# Patient Record
Sex: FEMALE | Race: WHITE | NOT HISPANIC OR LATINO | Employment: OTHER | URBAN - METROPOLITAN AREA
[De-identification: names, ages, dates, MRNs, and addresses within clinical notes are randomized per-mention and may not be internally consistent; named-entity substitution may affect disease eponyms.]

---

## 2017-01-27 ENCOUNTER — OFFICE VISIT (OUTPATIENT)
Dept: ENDOCRINOLOGY | Facility: CLINIC | Age: 77
End: 2017-01-27
Payer: MEDICARE

## 2017-01-27 VITALS
DIASTOLIC BLOOD PRESSURE: 88 MMHG | WEIGHT: 189.81 LBS | BODY MASS INDEX: 30.51 KG/M2 | HEIGHT: 66 IN | SYSTOLIC BLOOD PRESSURE: 152 MMHG | HEART RATE: 80 BPM

## 2017-01-27 DIAGNOSIS — E03.9 HYPOTHYROIDISM, UNSPECIFIED TYPE: Primary | ICD-10-CM

## 2017-01-27 DIAGNOSIS — E87.1 HYPONATREMIA: Primary | ICD-10-CM

## 2017-01-27 DIAGNOSIS — E87.1 HYPONATREMIA: ICD-10-CM

## 2017-01-27 PROCEDURE — 99214 OFFICE O/P EST MOD 30 MIN: CPT | Mod: PBBFAC,PO | Performed by: INTERNAL MEDICINE

## 2017-01-27 PROCEDURE — 99204 OFFICE O/P NEW MOD 45 MIN: CPT | Mod: S$PBB,,, | Performed by: INTERNAL MEDICINE

## 2017-01-27 PROCEDURE — 99999 PR PBB SHADOW E&M-EST. PATIENT-LVL IV: CPT | Mod: PBBFAC,,, | Performed by: INTERNAL MEDICINE

## 2017-01-27 RX ORDER — LEVOTHYROXINE SODIUM 25 UG/1
50 TABLET ORAL EVERY MORNING
Refills: 2 | COMMUNITY
Start: 2017-01-16 | End: 2017-05-17

## 2017-01-27 NOTE — PROGRESS NOTES
CHIEF COMPLAINT: Hypothyroid  76 year old being seen as a new patient. Hypothyroid since late 40's. On synthroid 25 mcg. Was on 50 mcg. She does have some fatigue. No palpitations. No tremors. No diarrhea or constipation. States that she had seen a nephrologist in the past for hyponatremia. She was on HCTZ in the past. Takes PRN lasix. On lexapro             PAST MEDICAL HISTORY/PAST SURGICAL HISTORY:  Reviewed in [facility]    SOCIAL HISTORY: No T/A    FAMILY HISTORY:  Daughter has hyperthyroidism. No DM    MEDICATIONS/ALLERGIES: The patient's MedCard has been updated and reviewed.      ROS:   Constitutional: No recent significant weight change  Eyes: No recent visual changes  ENT: No dysphagia  Cardiovascular: Denies current anginal symptoms  Respiratory: Denies current respiratory difficulty  Gastrointestinal: Denies recent bowel disturbances  GenitoUrinary - No dysuria  Skin: No new skin rash  Neurologic: No focal neurologic complaints  Remainder ROS negative        PE:    GENERAL: Well developed, well nourished.  PSYCH:  appropriate mood and affect  EYES:  PERRL, EOM intact.  ENT: Nares patent, oropharynx clear, mucosa pink,   NECK: Supple, trachea midline, No palpable thyroid nodules  CHEST: Resp even and unlabored, CTA bilateral.  CARDIAC: RRR, S1, S2 heard, no murmurs, rubs, S3, or S4  ABDOMEN: Soft, non-tender, non-distended;  No organomegaly  VASCULAR:  DP pulses +2/4 bilaterally, no edema  NEURO: Gait steady, CN II-VII grossly intact  SKIN: No areas of breakdown, no acanthosis nigracans.    Results for TIFFANY TRINA R (MRN [id number]) as of 1/27/2017 10:30   Ref. Range 9/29/2016 14:15 11/8/2016 14:40 1/17/2017 14:18   TSH Latest Ref Range: 0.400 - 4.000 uIU/mL 0.016 (L) 0.255 (L) 0.887   Free T4 Latest Ref Range: 0.78 - 2.19 ng/dL 0.94     Thyroglobulin Latest Ref Range: 1.3 - 31.8 ng/mL 0.6 (L)     Thyroperoxidase Antibodies Latest Ref Range: 0.0 - 9.0 IU/mL 1.0         ASSESSMENT/PLAN:  1. Hypothyroid- Dose  recently decreased. Last TSH WNL. Continue current Tx and recheck levels.     2. Hyponatremia- Will check with next labs. On SSRI which can associated with with SIADH. Appears she is restricting fluid but she does not recall how much.       FOLLOWUP  4 weeks- TSH, Ft4, 8 AM ACTH, cortisol, uric acid, CMP, osm, urine osm, urine Na

## 2017-01-27 NOTE — LETTER
January 27, 2017      Felicita Hogan MD  [address]  [facility] [address]           [facility]  [address] [facility] [address] [place] [address]  Phone: [phone]  Fax: [phone]          Patient: Ela Mascorro   MR Number: [id number]   YOB: 1940   Date of Visit: 1/27/2017       Dear Dr. Felicita Hogan:    Thank you for referring Ela Mascorro to me for evaluation. Attached you will find relevant portions of my assessment and plan of care.    If you have questions, please do not hesitate to call me. I look forward to following Ela Mascorro along with you.    Sincerely,    DO Marcella Lozano  CC:  No Recipients    If you would like to receive this communication electronically, please contact [email or web address] or [phone] to request more information on [facility] access.    For providers and/or their staff who would like to refer a patient to [facility], please contact us through our one-stop-shop provider referral line, [facility] [place], at [phone].    If you feel you have received this communication in error or would no longer like to receive these types of communications, please e-mail [email or web address]

## 2017-01-27 NOTE — MR AVS SNAPSHOT
[facility]  [address] [facility] [address]  [place] [address]  Phone: [phone]  Fax: [phone]                  Elakme Nguyenford   2017 10:00 AM   Office Visit    Description:  Female : 1940   Provider:  Lior Naidu DO   Department:  [facility]           Diagnoses this Visit        Comments    Hypothyroidism, unspecified type    -  Primary     Hyponatremia                To Do List           Future Appointments        Provider Department Dept Phone    2017 8:00 AM LAB, [place] [facility] [phone]      Goals (5 Years of Data)     None      [facility]     [facility] On [facility] Nurse Care Line -  Assistance  Registered nurses in the [facility] Center provide clinical advisement, health education, appointment booking, and other advisory services.  Call for this free service at [phone].             Medications           Message regarding Medications     Verify the changes and/or additions to your medication regime listed below are the same as discussed with your clinician today.  If any of these changes or additions are incorrect, please notify your healthcare provider.        STOP taking these medications     pantoprazole (PROTONIX) 40 MG tablet Take 40 mg by mouth once daily.           Verify that the below list of medications is an accurate representation of the medications you are currently taking.  If none reported, the list may be blank. If incorrect, please contact your healthcare provider. Carry this list with you in case of emergency.           Current Medications     ASPIRIN (ASPIR-81 ORAL) Take by mouth.    atorvastatin (LIPITOR) 40 MG tablet TAKE 1 TABLET BY MOUTH EVERYDAY    bifidobacterium infantis (ALIGN) 4 mg Cap Take 4 mg by mouth Daily. ALIGN 4 MG CAPS    biotin-silicon diox-L-cysteine 5,000 mcg-100 mg- 50 mg Tab BIOTIN 5000 CAPS    BYSTOLIC 5 mg Tab TAKE 1 TABLET BY MOUTH EVERY DAY    BYSTOLIC 5 mg Tab TAKE 1  TABLET BY MOUTH EVERY DAY    CALCIUM CARBONATE/VITAMIN D3 (VITAMIN D-3 ORAL) VITAMIN D3 TABS    desloratadine (CLARINEX) 5 mg tablet Take 5 mg by mouth once daily.    escitalopram oxalate (LEXAPRO) 20 MG tablet Take 1 tablet (20 mg total) by mouth every evening.    esomeprazole (NEXIUM) 40 MG capsule Take 40 mg by mouth before breakfast.    furosemide (LASIX) 20 MG tablet Take 1 tablet in the mornings up to 3 days a week if needed for leg edema    hydrALAZINE (APRESOLINE) 10 MG tablet TAKE ONE TABLET BY MOUTH TWICE DAILY    hydrocodone-acetaminophen 5-325mg (NORCO) 5-325 mg per tablet Take 1 tablet by mouth every 4 (four) hours as needed for Pain.    levothyroxine (SYNTHROID) 25 MCG tablet Take 12.5 mcg by mouth every morning.    mecobal-levomefolat Ca-B6 phos 3-35-2 mg Tab TAKE ONE TABLET BY MOUTH ONE TIME DAILY    montelukast (SINGULAIR) 10 mg tablet Take 10 mg by mouth once daily. SINGULAIR 10 MG TABS    nitroGLYCERIN (NITROSTAT) 0.4 MG SL tablet Place 1 tablet (0.4 mg total) under the tongue as directed. 1 SL every 5 minutes prn chest pain; Call 911 for chest pain not relieved with 3 NTG tablets    nutritional supplement - fiber Liqd JUICE PLUS FIBRE LIQD    olmesartan (BENICAR) 40 MG tablet Take 1 tablet (40 mg total) by mouth once daily.    polyethylene glycol (GLYCOLAX) 17 gram PwPk Take 17 g by mouth 2 (two) times daily as needed.    potassium chloride SA (K-DUR,KLOR-CON) 20 MEQ tablet Take 1 tablet in the mornings up to 3 days a week only on days furosemide is taken    pregabalin (LYRICA) 75 MG capsule 1 capsule 2-3 times a day as needed    PREMARIN 0.625 mg tablet TAKE 1 TABLET (0.625 MG TOTAL) BY MOUTH ONCE DAILY.    butalbital-acetaminophen-caffeine -40 mg (FIORICET, ESGIC) -40 mg per tablet Take 1-2 tablets by mouth as directed. 1-2 po q 4 hr prn headache; maximum of 6 per 24 hr           Clinical Reference Information           Vital Signs - Last Recorded  Most recent update: 1/27/2017  "10:07 AM by Lima Montiel LPN    BP Pulse Ht Wt BMI    (!) 152/88 (BP Method: Manual) 80 5' 6" (1.676 m) 86.1 kg (189 lb 13.1 oz) 30.64 kg/m2      Blood Pressure          Most Recent Value    BP  (!)  152/88      Allergies as of 1/27/2017     Azithromycin    Clarithromycin    Codeine    Hydralazine Analogues    Hydrochlorothiazide    Iodinated Contrast Media - Iv Dye    Levofloxacin    Moxifloxacin    Sulfamethoxazole-trimethoprim      Immunizations Administered on Date of Encounter - 1/27/2017     None      Orders Placed During Today's Visit      Normal Orders This Visit    OSMOLALITY, URINE RANDOM     Sodium, urine, random     Future Labs/Procedures Expected by Expires    ACTH  1/27/2017 3/28/2018    Comprehensive metabolic panel  1/27/2017 1/28/2018    Cortisol  1/27/2017 1/27/2018    Osmolality  1/27/2017 3/28/2018    T4, free  1/27/2017 1/27/2018    TSH  1/27/2017 1/27/2018    URIC ACID  1/27/2017 3/28/2018      "

## 2017-02-24 ENCOUNTER — LAB VISIT (OUTPATIENT)
Dept: LAB | Facility: HOSPITAL | Age: 77
End: 2017-02-24
Attending: INTERNAL MEDICINE
Payer: MEDICARE

## 2017-02-24 DIAGNOSIS — J31.0 CHRONIC RHINITIS: ICD-10-CM

## 2017-02-24 DIAGNOSIS — E03.9 HYPOTHYROIDISM, UNSPECIFIED TYPE: ICD-10-CM

## 2017-02-24 DIAGNOSIS — D83.0 BAFF RECEPTOR DEFICIENCY: Primary | ICD-10-CM

## 2017-02-24 DIAGNOSIS — E87.1 HYPONATREMIA: ICD-10-CM

## 2017-02-24 DIAGNOSIS — J30.1 ALLERGIC RHINITIS DUE TO POLLEN: ICD-10-CM

## 2017-02-24 LAB
ALBUMIN SERPL BCP-MCNC: 3.3 G/DL
ALP SERPL-CCNC: 126 U/L
ALT SERPL W/O P-5'-P-CCNC: 35 U/L
ANION GAP SERPL CALC-SCNC: 8 MMOL/L
AST SERPL-CCNC: 22 U/L
BASOPHILS # BLD AUTO: 0.02 K/UL
BASOPHILS NFR BLD: 0.3 %
BILIRUB SERPL-MCNC: 0.4 MG/DL
BUN SERPL-MCNC: 11 MG/DL
CALCIUM SERPL-MCNC: 9.2 MG/DL
CHLORIDE SERPL-SCNC: 104 MMOL/L
CO2 SERPL-SCNC: 23 MMOL/L
CORTIS SERPL-MCNC: 1.1 UG/DL
CREAT SERPL-MCNC: 0.8 MG/DL
DIFFERENTIAL METHOD: ABNORMAL
EOSINOPHIL # BLD AUTO: 0 K/UL
EOSINOPHIL NFR BLD: 0.6 %
ERYTHROCYTE [DISTWIDTH] IN BLOOD BY AUTOMATED COUNT: 17 %
EST. GFR  (AFRICAN AMERICAN): >60 ML/MIN/1.73 M^2
EST. GFR  (NON AFRICAN AMERICAN): >60 ML/MIN/1.73 M^2
GLUCOSE SERPL-MCNC: 115 MG/DL
HCT VFR BLD AUTO: 39.9 %
HGB BLD-MCNC: 13.1 G/DL
IGA SERPL-MCNC: 90 MG/DL
IGE SERPL-ACNC: <35 IU/ML
IGG SERPL-MCNC: 449 MG/DL
IGM SERPL-MCNC: 87 MG/DL
LYMPHOCYTES # BLD AUTO: 2.2 K/UL
LYMPHOCYTES NFR BLD: 32.1 %
MCH RBC QN AUTO: 28.9 PG
MCHC RBC AUTO-ENTMCNC: 32.8 %
MCV RBC AUTO: 88 FL
MONOCYTES # BLD AUTO: 0.6 K/UL
MONOCYTES NFR BLD: 8.8 %
NEUTROPHILS # BLD AUTO: 4 K/UL
NEUTROPHILS NFR BLD: 57.8 %
OSMOLALITY SERPL: 281 MOSM/KG
PLATELET # BLD AUTO: 282 K/UL
PMV BLD AUTO: 9.7 FL
POTASSIUM SERPL-SCNC: 4.3 MMOL/L
PROT SERPL-MCNC: 6.5 G/DL
RBC # BLD AUTO: 4.53 M/UL
SODIUM SERPL-SCNC: 135 MMOL/L
T4 FREE SERPL-MCNC: 0.76 NG/DL
TSH SERPL DL<=0.005 MIU/L-ACNC: 4.68 UIU/ML
URATE SERPL-MCNC: 5.8 MG/DL
WBC # BLD AUTO: 6.92 K/UL

## 2017-02-24 PROCEDURE — 84550 ASSAY OF BLOOD/URIC ACID: CPT

## 2017-02-24 PROCEDURE — 80053 COMPREHEN METABOLIC PANEL: CPT

## 2017-02-24 PROCEDURE — 82785 ASSAY OF IGE: CPT

## 2017-02-24 PROCEDURE — 82533 TOTAL CORTISOL: CPT

## 2017-02-24 PROCEDURE — 84439 ASSAY OF FREE THYROXINE: CPT

## 2017-02-24 PROCEDURE — 86317 IMMUNOASSAY INFECTIOUS AGENT: CPT | Mod: 59

## 2017-02-24 PROCEDURE — 83930 ASSAY OF BLOOD OSMOLALITY: CPT

## 2017-02-24 PROCEDURE — 36415 COLL VENOUS BLD VENIPUNCTURE: CPT | Mod: PO

## 2017-02-24 PROCEDURE — 84443 ASSAY THYROID STIM HORMONE: CPT

## 2017-02-24 PROCEDURE — 82024 ASSAY OF ACTH: CPT

## 2017-02-24 PROCEDURE — 85025 COMPLETE CBC W/AUTO DIFF WBC: CPT

## 2017-02-24 PROCEDURE — 82787 IGG 1 2 3 OR 4 EACH: CPT | Mod: 59

## 2017-02-24 PROCEDURE — 82784 ASSAY IGA/IGD/IGG/IGM EACH: CPT

## 2017-02-27 LAB
IGG1 SER-MCNC: 306 MG/DL
IGG2 SER-MCNC: 127 MG/DL
IGG3 SER-MCNC: 13 MG/DL
IGG4 SER-MCNC: 7 MG/DL

## 2017-03-03 LAB
ACTH PLAS-MCNC: <10 PG/ML
C DIPHTHERIAE AB SER IA-ACNC: 0.06 IU/ML
C TETANI AB SER-ACNC: 0.06 IU/ML
DEPRECATED S PNEUM 1 IGG SER-MCNC: 2.8 MCG/ML
DEPRECATED S PNEUM12 IGG SER-MCNC: <0.3 MCG/ML
DEPRECATED S PNEUM14 IGG SER-MCNC: 0.8 MCG/ML
DEPRECATED S PNEUM19 IGG SER-MCNC: 1.5 MCG/ML
DEPRECATED S PNEUM23 IGG SER-MCNC: <0.3 MCG/ML
DEPRECATED S PNEUM3 IGG SER-MCNC: <0.3 MCG/ML
DEPRECATED S PNEUM4 IGG SER-MCNC: <0.3 MCG/ML
DEPRECATED S PNEUM5 IGG SER-MCNC: 7 MCG/ML
DEPRECATED S PNEUM8 IGG SER-MCNC: 2.6 MCG/ML
DEPRECATED S PNEUM9 IGG SER-MCNC: <0.3 MCG/ML
HAEM INFLU B IGG SER-MCNC: 1.67 MG/L
S PNEUM DA 18C IGG SER-MCNC: <0.3 MCG/ML
S PNEUM DA 6B IGG SER-MCNC: <0.3 MCG/ML
S PNEUM DA 7F IGG SER-MCNC: 0.7 MCG/ML
S PNEUM DA 9V IGG SER-MCNC: <0.3 MCG/ML

## 2017-03-05 ENCOUNTER — TELEPHONE (OUTPATIENT)
Dept: ENDOCRINOLOGY | Facility: CLINIC | Age: 77
End: 2017-03-05

## 2017-03-05 DIAGNOSIS — E87.1 HYPONATREMIA: ICD-10-CM

## 2017-03-05 DIAGNOSIS — E03.9 HYPOTHYROIDISM, UNSPECIFIED TYPE: Primary | ICD-10-CM

## 2017-03-06 NOTE — TELEPHONE ENCOUNTER
Let her know will need a higher dose of synthroid. Go back up to synthroid 25 mcg from 12.5 mcg daily. Check TSH 6 weeks. F/U 4 months with CMP, TSH, uric acid

## 2017-03-21 ENCOUNTER — OFFICE VISIT (OUTPATIENT)
Dept: ORTHOPEDICS | Facility: CLINIC | Age: 77
End: 2017-03-21
Payer: MEDICARE

## 2017-03-21 VITALS — WEIGHT: 189 LBS | BODY MASS INDEX: 30.37 KG/M2 | HEIGHT: 66 IN

## 2017-03-21 DIAGNOSIS — M19.079 ARTHRITIS, MIDFOOT: Primary | ICD-10-CM

## 2017-03-21 PROCEDURE — 20600 DRAIN/INJ JOINT/BURSA W/O US: CPT | Mod: PBBFAC,PO | Performed by: ORTHOPAEDIC SURGERY

## 2017-03-21 PROCEDURE — 99212 OFFICE O/P EST SF 10 MIN: CPT | Mod: PBBFAC,PO | Performed by: ORTHOPAEDIC SURGERY

## 2017-03-21 PROCEDURE — 99212 OFFICE O/P EST SF 10 MIN: CPT | Mod: S$PBB,25,, | Performed by: ORTHOPAEDIC SURGERY

## 2017-03-21 PROCEDURE — 99999 PR PBB SHADOW E&M-EST. PATIENT-LVL II: CPT | Mod: PBBFAC,,, | Performed by: ORTHOPAEDIC SURGERY

## 2017-03-21 RX ADMIN — TRIAMCINOLONE ACETONIDE 40 MG: 40 INJECTION, SUSPENSION INTRA-ARTICULAR; INTRAMUSCULAR at 08:03

## 2017-03-23 RX ORDER — TRIAMCINOLONE ACETONIDE 40 MG/ML
40 INJECTION, SUSPENSION INTRA-ARTICULAR; INTRAMUSCULAR
Status: DISCONTINUED | OUTPATIENT
Start: 2017-03-21 | End: 2017-03-23 | Stop reason: HOSPADM

## 2017-03-23 NOTE — PROGRESS NOTES
Subjective:      Patient ID: Ela Mascorro is a 76 y.o. female.    Pain      She is here for f/u today for bilateral foot pain.  She is s/p right midfoot fusion which has healed well but she has osteoarthritis in the entire foot and she is complaining of pain proximal to the fusion sites.   She rates her pain as 9/10 today on the right and 9/10 on the left. She would like an injection for both feet.     Social History     Occupational History    Not on file.     Social History Main Topics    Smoking status: Never Smoker    Smokeless tobacco: Never Used    Alcohol use No    Drug use: No    Sexual activity: Not on file      ROS    neg  Objective:    Ortho Exam     BLEs: neurovascularly intact,  tenderness to palpation dorsolateral midfoot on the left with moderate swelling.  tenderness to palpation at the talonavicular joint on the right.        Assessment:                Encounter Diagnosis   Name Primary?    Arthritis, midfoot Yes        Plan:     I injected both feet today. I can repeat injections every 4-6 months or prn as needed, however I would not repeat injections more than maybe 2 more times as they don't last her very long.

## 2017-03-23 NOTE — PROCEDURES
Small Joint Aspiration/Injection  Date/Time: 3/21/2017 8:18 AM  Performed by: JASON JOHNSON  Authorized by: JASON JOHNSON     Consent Done?:  Yes (Verbal)  Indications:  Pain  Site marked: The procedure site was marked      Location:  Foot  Site:  R intertarsal and L intertarsal  Prep: Patient was prepped and draped in usual sterile fashion    Needle size:  22 G  Approach:  Dorsal  Medications:  40 mg triamcinolone acetonide 40 mg/mL; 40 mg triamcinolone acetonide 40 mg/mL  Patient tolerance:  Patient tolerated the procedure well with no immediate complications

## 2017-03-29 ENCOUNTER — OFFICE VISIT (OUTPATIENT)
Dept: ORTHOPEDICS | Facility: CLINIC | Age: 77
End: 2017-03-29
Payer: MEDICARE

## 2017-03-29 ENCOUNTER — HOSPITAL ENCOUNTER (OUTPATIENT)
Dept: RADIOLOGY | Facility: HOSPITAL | Age: 77
Discharge: HOME | End: 2017-03-29
Attending: ORTHOPAEDIC SURGERY | Admitting: ORTHOPAEDIC SURGERY
Payer: MEDICARE

## 2017-03-29 VITALS
BODY MASS INDEX: 30.37 KG/M2 | HEART RATE: 79 BPM | DIASTOLIC BLOOD PRESSURE: 70 MMHG | WEIGHT: 189 LBS | SYSTOLIC BLOOD PRESSURE: 133 MMHG | HEIGHT: 66 IN

## 2017-03-29 DIAGNOSIS — M25.562 PAIN IN BOTH KNEES, UNSPECIFIED CHRONICITY: ICD-10-CM

## 2017-03-29 DIAGNOSIS — M25.561 PAIN IN BOTH KNEES, UNSPECIFIED CHRONICITY: ICD-10-CM

## 2017-03-29 DIAGNOSIS — M25.561 PAIN IN BOTH KNEES, UNSPECIFIED CHRONICITY: Primary | ICD-10-CM

## 2017-03-29 DIAGNOSIS — M17.0 PRIMARY OSTEOARTHRITIS OF BOTH KNEES: Primary | ICD-10-CM

## 2017-03-29 DIAGNOSIS — M25.562 PAIN IN BOTH KNEES, UNSPECIFIED CHRONICITY: Primary | ICD-10-CM

## 2017-03-29 PROCEDURE — 20610 DRAIN/INJ JOINT/BURSA W/O US: CPT | Mod: 50,PBBFAC,PO | Performed by: ORTHOPAEDIC SURGERY

## 2017-03-29 PROCEDURE — 99999 PR PBB SHADOW E&M-EST. PATIENT-LVL III: CPT | Mod: PBBFAC,,, | Performed by: ORTHOPAEDIC SURGERY

## 2017-03-29 PROCEDURE — 99213 OFFICE O/P EST LOW 20 MIN: CPT | Mod: PBBFAC,PO | Performed by: ORTHOPAEDIC SURGERY

## 2017-03-29 PROCEDURE — 73564 X-RAY EXAM KNEE 4 OR MORE: CPT | Mod: 26,50,, | Performed by: RADIOLOGY

## 2017-03-29 PROCEDURE — 99213 OFFICE O/P EST LOW 20 MIN: CPT | Mod: 25,S$PBB,, | Performed by: ORTHOPAEDIC SURGERY

## 2017-03-29 RX ORDER — TRIAMCINOLONE ACETONIDE 40 MG/ML
40 INJECTION, SUSPENSION INTRA-ARTICULAR; INTRAMUSCULAR
Status: DISCONTINUED | OUTPATIENT
Start: 2017-03-29 | End: 2017-03-29 | Stop reason: HOSPADM

## 2017-03-29 RX ADMIN — TRIAMCINOLONE ACETONIDE 40 MG: 40 INJECTION, SUSPENSION INTRA-ARTICULAR; INTRAMUSCULAR at 04:03

## 2017-03-29 NOTE — PROCEDURES
Large Joint Aspiration/Injection  Date/Time: 3/29/2017 4:16 PM  Performed by: JENNIFER PAUL  Authorized by: JENNIFER PAUL     Consent Done?:  Yes (Verbal)  Indications:  Pain  Procedure site marked: Yes    Timeout: Prior to procedure the correct patient, procedure, and site was verified      Location:  Knee  Site:  R knee and L knee  Prep: Patient was prepped and draped in usual sterile fashion    Needle size:  20 G  Approach:  Anterolateral  Medications:  40 mg triamcinolone acetonide 40 mg/mL; 40 mg triamcinolone acetonide 40 mg/mL  Patient tolerance:  Patient tolerated the procedure well with no immediate complications

## 2017-03-29 NOTE — PROGRESS NOTES
"Past Medical History:   Diagnosis Date    a Bilateral JOSE     Dr. Ezra hernandez CAD With H/O Stenting     Dr. Ezra hernandez Cardiac valvulopathy     Dr. Ezra mcmillan Hypertension     8/4/16 Changed Norvasc 5 Mg Daily To Hydralazine 10 Mg Bid; 5/14/16 D/Cd Benicar-HCTZ (HCTZ Decreased Her Na+)    b SIADH With Chronic Hyponatremia     5/28/16 Referred To Dr. Dov Rasmussen (She Had Been Seeing Dr. Yazan Nunes's Partner); HCTZ Significantly Worsenes Her Hyponatremia    c Homocystinemia     c Hyperlipidemia With Mild Hypertriglyceridemia     e Hypothyroidism     f Obesity     g Factor VIII Disorder     Dr. Blaine Garcia On 9/14/16 Ordered A Lab W/U For Spontaneous Ecchymosis    g Spontaneous Ecchymosis     Dr. Blaine Garcia On 9/14/16 Ordered A Lab W/U For Spontaneous Ecchymosis    i Chronic Mild ROMERO     j Chronic Diarrhea 07/2014     Dr. HARLEY Choe    j Constipation 7/5/16     Dr. HARLEY Choe; 7/5/16 I RXd MiraLax Bid PRN    j H/O Colon Polyps On 10/11/16 TC     Dr. HARLEY Choe: "Repeat TC In 3 YRs"    j Hepatic Steatosis     Dr. HARLEY Choe; 7/8/16 Bilateral Renal U/S = Fatty Liver Changes But Otherwise Normal    k Overactive bladder     7/8/16 Bilateral Renal U/S = Fatty Liver Changes But Otherwise Normal    k Recurrent UTIs     7/8/16 Bilateral Renal U/S = Fatty Liver Changes But Otherwise Normal    l H/O Right Foot Surgery 3/9/16     Dr. John armstrong Lumbar Spinal DDD     l Right 3rd Finger Arthropathy With Edema     Dr. Claudio Mahmood On 09/2016 Referred Her To Dr. Blaine Garcia For Spontaneous Ecchymosis    m BLE Peripheral Neuropathy     8/4/16 Restarted Lyrica 75 Mg With BMP In 2 Weeks (To Check Na+ Level)    m Chronic Fatigue     m Recurrent Headaches     o Allergic Rhinosinusitis     o Tongue papillae hypertrophy     q BLE Varicose veins     q Chronic BLE edema     q Vitamin D deficiency        Past Surgical History:   Procedure " Laterality Date    CARDIAC SURGERY  3yrs ago    CATARACT EXTRACTION      x 2    CORONARY ANGIOPLASTY WITH STENT PLACEMENT  10/2011    FOOT SURGERY      TONSILLECTOMY      TOTAL ABDOMINAL HYSTERECTOMY W/ BILATERAL SALPINGOOPHORECTOMY N/A     TOTAL HIP ARTHROPLASTY Left 9 yrs ago       Current Outpatient Prescriptions   Medication Sig    ASPIRIN (ASPIR-81 ORAL) Take by mouth.    atorvastatin (LIPITOR) 40 MG tablet TAKE 1 TABLET BY MOUTH EVERYDAY    bifidobacterium infantis (ALIGN) 4 mg Cap Take 4 mg by mouth Daily. ALIGN 4 MG CAPS    biotin-silicon diox-L-cysteine 5,000 mcg-100 mg- 50 mg Tab BIOTIN 5000 CAPS    butalbital-acetaminophen-caffeine -40 mg (FIORICET, ESGIC) -40 mg per tablet Take 1-2 tablets by mouth as directed. 1-2 po q 4 hr prn headache; maximum of 6 per 24 hr    BYSTOLIC 5 mg Tab TAKE 1 TABLET BY MOUTH EVERY DAY    BYSTOLIC 5 mg Tab TAKE 1 TABLET BY MOUTH EVERY DAY    CALCIUM CARBONATE/VITAMIN D3 (VITAMIN D-3 ORAL) VITAMIN D3 TABS    desloratadine (CLARINEX) 5 mg tablet Take 5 mg by mouth once daily.    escitalopram oxalate (LEXAPRO) 20 MG tablet Take 1 tablet (20 mg total) by mouth every evening.    esomeprazole (NEXIUM) 40 MG capsule Take 40 mg by mouth before breakfast.    furosemide (LASIX) 20 MG tablet Take 1 tablet in the mornings up to 3 days a week if needed for leg edema    hydrALAZINE (APRESOLINE) 10 MG tablet TAKE ONE TABLET BY MOUTH TWICE DAILY    hydrocodone-acetaminophen 5-325mg (NORCO) 5-325 mg per tablet Take 1 tablet by mouth every 4 (four) hours as needed for Pain.    levothyroxine (SYNTHROID) 25 MCG tablet Take 25 mcg by mouth every morning.    LYRICA 75 mg capsule TAKE 1 CAPSULE 2-3 TIMES PER DAY AS NEEDED    mecobal-levomefolat Ca-B6 phos 3-35-2 mg Tab TAKE ONE TABLET BY MOUTH ONE TIME DAILY    montelukast (SINGULAIR) 10 mg tablet Take 10 mg by mouth once daily. SINGULAIR 10 MG TABS    nitroGLYCERIN (NITROSTAT) 0.4 MG SL tablet Place 1 tablet  "(0.4 mg total) under the tongue as directed. 1 SL every 5 minutes prn chest pain; Call 911 for chest pain not relieved with 3 NTG tablets    nutritional supplement - fiber Liqd JUICE PLUS FIBRE LIQD    olmesartan (BENICAR) 40 MG tablet Take 1 tablet (40 mg total) by mouth once daily.    polyethylene glycol (GLYCOLAX) 17 gram PwPk Take 17 g by mouth 2 (two) times daily as needed.    potassium chloride SA (K-DUR,KLOR-CON) 20 MEQ tablet Take 1 tablet in the mornings up to 3 days a week only on days furosemide is taken    PREMARIN 0.625 mg tablet TAKE 1 TABLET (0.625 MG TOTAL) BY MOUTH ONCE DAILY.     No current facility-administered medications for this visit.        Review of patient's allergies indicates:   Allergen Reactions    Azithromycin Diarrhea    Clarithromycin Diarrhea    Codeine      Other reaction(s): makes "sick"    Hydralazine analogues      Increased urinary frequency    Hydrochlorothiazide      Significantly worsens her hyponatremia    Iodinated contrast media - iv dye     Levofloxacin Diarrhea    Moxifloxacin Diarrhea    Sulfamethoxazole-trimethoprim      Other reaction(s): diarrhea       Family History   Problem Relation Age of Onset    Heart disease Father     Cancer Mother      ovarian    Hypertension Mother     Cancer Sister      ovarian    Hypertension Sister     Thyroid disease Daughter     Hyperlipidemia Sister        Social History     Social History    Marital status:      Spouse name: N/A    Number of children: N/A    Years of education: N/A     Occupational History    Not on file.     Social History Main Topics    Smoking status: Never Smoker    Smokeless tobacco: Never Used    Alcohol use No    Drug use: No    Sexual activity: Not on file     Other Topics Concern    Not on file     Social History Narrative       Chief Complaint:   Chief Complaint   Patient presents with    Knee Pain     bilateral        History of present illness: Is a 76-year-old " patient mine comes in with new onset bilateral knee pain.  I've seen her for her knees in the past.  She had a fall about 3 weeks ago and landed directly onto both knees.  Her left knee has always been the worst.  She last had an injection in her left knee back in November and worked great until she fell.  Pain today as an 8 out of 10.      Review of Systems:    Constitution: Negative for chills, fever, and sweats.  Negative for unexplained weight loss.    HENT:  Negative for headaches and blurry vision.    Cardiovascular:Negative for chest pain or irregular heart beat. Negative for hypertension.    Respiratory:  Negative for cough and shortness of breath.    Gastrointestinal: Negative for abdominal pain, heartburn, melena, nausea, and vomitting.    Genitourinary:  Negative bladder incontinence and dysuria.    Musculoskeletal:  See HPI    Neurological: Negative for numbness.    Psychiatric/Behavioral: Negative for depression.  The patient is not nervous/anxious.      Endocrine: Negative for polyuria    Hematologic/Lymphatic: Negative for bleeding problem.  Does not bruise/bleed easily.    Skin: Negative for poor would healing and rash      Physical Examination:    Vital Signs:    Vitals:    03/29/17 1538   BP: 133/70   Pulse: 79       Body mass index is 30.51 kg/(m^2).    This a well-developed, well nourished patient in no acute distress.  They are alert and oriented and cooperative to examination.  Pt. walks without an antalgic gait.      Examination of bilateral knees shows no rashes or erythema. There are bruises and swelling over the anterior both knees. Patient has full range of motion from 0-130°. Patient is nontender to palpation over lateral joint line and nontender to palpation over the medial joint line. Patient has a - Lachman exam, - anterior drawer exam, and - posterior drawer exam. - Gaye's exam. Knee is stable to varus and valgus stress. 5 out of 5 motor strength. Palpable distal pulses. Intact  light touch sensation.  Moderate Patellofemoral crepitus      X-rays: X-rays of both knees are ordered and reviewed which show moderate arthritic changes tricompartmentally with full-thickness patellofemoral arthritis on the left     Assessment:: Bilateral knee arthritis    Plan:  I reviewed the findings with her today.  Patient like to try another set of cortisone injections.  Follow-up as needed.    This note was created using Dragon voice recognition software that occasionally misinterpreted phrases or words.    Consult note is delivered via Epic messaging service.

## 2017-04-17 ENCOUNTER — LAB VISIT (OUTPATIENT)
Dept: LAB | Facility: HOSPITAL | Age: 77
End: 2017-04-17
Attending: INTERNAL MEDICINE
Payer: MEDICARE

## 2017-04-17 DIAGNOSIS — E87.1 HYPONATREMIA: ICD-10-CM

## 2017-04-17 DIAGNOSIS — E03.9 HYPOTHYROIDISM, UNSPECIFIED TYPE: ICD-10-CM

## 2017-04-17 PROCEDURE — 36415 COLL VENOUS BLD VENIPUNCTURE: CPT | Mod: PO

## 2017-04-17 PROCEDURE — 84443 ASSAY THYROID STIM HORMONE: CPT

## 2017-04-18 LAB — TSH SERPL DL<=0.005 MIU/L-ACNC: 2.09 UIU/ML

## 2017-05-17 PROBLEM — R60.0 BILATERAL LEG EDEMA: Status: ACTIVE | Noted: 2017-05-17

## 2017-05-17 PROBLEM — L97.922 NON-PRESSURE CHRONIC ULCER OF LEFT LOWER LEG WITH FAT LAYER EXPOSED: Status: ACTIVE | Noted: 2017-05-17

## 2017-05-17 PROBLEM — L97.911 NON-PRESSURE CHRONIC ULCER OF RIGHT LOWER LEG, LIMITED TO BREAKDOWN OF SKIN: Status: ACTIVE | Noted: 2017-05-17

## 2017-05-17 PROBLEM — L97.929 VENOUS ULCER OF LEFT LEG: Status: ACTIVE | Noted: 2017-05-17

## 2017-05-17 PROBLEM — I83.029 VENOUS ULCER OF LEFT LEG: Status: ACTIVE | Noted: 2017-05-17

## 2017-06-08 ENCOUNTER — TELEPHONE (OUTPATIENT)
Dept: ORTHOPEDICS | Facility: CLINIC | Age: 77
End: 2017-06-08

## 2017-06-08 NOTE — TELEPHONE ENCOUNTER
----- Message from Dhara Hurt sent at 6/8/2017  9:36 AM CDT -----  Contact: Self  Patient is calling to ask if it's too soon for a steroid injection in her knee.  Last one was on 3/29.  Please advise.

## 2017-06-14 ENCOUNTER — OFFICE VISIT (OUTPATIENT)
Dept: ORTHOPEDICS | Facility: CLINIC | Age: 77
End: 2017-06-14
Payer: MEDICARE

## 2017-06-14 VITALS
BODY MASS INDEX: 30.05 KG/M2 | HEIGHT: 66 IN | WEIGHT: 187 LBS | HEART RATE: 76 BPM | SYSTOLIC BLOOD PRESSURE: 132 MMHG | DIASTOLIC BLOOD PRESSURE: 68 MMHG

## 2017-06-14 DIAGNOSIS — M17.12 ARTHRITIS OF KNEE, LEFT: Primary | ICD-10-CM

## 2017-06-14 PROCEDURE — 99999 PR PBB SHADOW E&M-EST. PATIENT-LVL II: CPT | Mod: PBBFAC,,, | Performed by: ORTHOPAEDIC SURGERY

## 2017-06-14 PROCEDURE — 20610 DRAIN/INJ JOINT/BURSA W/O US: CPT | Mod: PBBFAC,PO | Performed by: ORTHOPAEDIC SURGERY

## 2017-06-14 PROCEDURE — 1125F AMNT PAIN NOTED PAIN PRSNT: CPT | Mod: ,,, | Performed by: ORTHOPAEDIC SURGERY

## 2017-06-14 PROCEDURE — 99212 OFFICE O/P EST SF 10 MIN: CPT | Mod: PBBFAC,PO,25 | Performed by: ORTHOPAEDIC SURGERY

## 2017-06-14 PROCEDURE — 1159F MED LIST DOCD IN RCRD: CPT | Mod: ,,, | Performed by: ORTHOPAEDIC SURGERY

## 2017-06-14 PROCEDURE — 99213 OFFICE O/P EST LOW 20 MIN: CPT | Mod: 25,S$PBB,, | Performed by: ORTHOPAEDIC SURGERY

## 2017-06-14 RX ORDER — TRIAMCINOLONE ACETONIDE 40 MG/ML
40 INJECTION, SUSPENSION INTRA-ARTICULAR; INTRAMUSCULAR
Status: DISCONTINUED | OUTPATIENT
Start: 2017-06-14 | End: 2017-06-14 | Stop reason: HOSPADM

## 2017-06-14 RX ADMIN — TRIAMCINOLONE ACETONIDE 40 MG: 40 INJECTION, SUSPENSION INTRA-ARTICULAR; INTRAMUSCULAR at 11:06

## 2017-06-14 NOTE — PROCEDURES
Large Joint Aspiration/Injection  Date/Time: 6/14/2017 11:27 AM  Performed by: JENNIFER PAUL  Authorized by: JENNIFER PAUL     Consent Done?:  Yes (Verbal)  Indications:  Pain  Procedure site marked: Yes    Timeout: Prior to procedure the correct patient, procedure, and site was verified      Location:  Knee  Site:  L knee  Prep: Patient was prepped and draped in usual sterile fashion    Needle size:  20 G  Approach:  Anterolateral  Medications:  40 mg triamcinolone acetonide 40 mg/mL  Patient tolerance:  Patient tolerated the procedure well with no immediate complications

## 2017-06-14 NOTE — PROGRESS NOTES
"Past Medical History:   Diagnosis Date    a Bilateral JOSE     Dr. Ezra hernandez CAD With H/O Stenting     Dr. Ezra hernandez Cardiac valvulopathy     Dr. Ezra mcmillan Hypertension     8/4/16 Changed Norvasc 5 Mg Daily To Hydralazine 10 Mg Bid; 5/14/16 D/Cd Benicar-HCTZ (HCTZ Decreased Her Na+)    b SIADH With Chronic Hyponatremia     5/28/16 Referred To Dr. Dov Rasmussen (She Had Been Seeing Dr. Yazan Nunes's Partner); HCTZ Significantly Worsenes Her Hyponatremia    c Homocystinemia     c Hypercholesterolemia With High HDL     5/17/17 Increased Lipitor To 80 Mg qHS With Repeat Labs In 6 Weeks    c Mild Hypertriglyceridemia     e Hypothyroidism     5/17/17 Increased 25 Mcg Levothyroxine To 50 Mcg qAM With Repeat TFTs In 4 Months    f Obesity     g Factor VIII Disorder     Dr. Blaine Garcia On 9/14/16 Ordered A Lab W/U For Spontaneous Ecchymosis    g Spontaneous Ecchymosis     Dr. Blaine Garcia On 9/14/16 Ordered A Lab W/U For Spontaneous Ecchymosis    i Chronic Mild ROMERO     j Chronic Diarrhea 07/2014     Dr. HARLEY Choe    j Constipation 7/5/16     Dr. HARLEY Choe; 7/5/16 I RXd MiraLax Bid PRN    j H/O Colon Polyps On 10/11/16 TC     Dr. HARLEY Choe: "Repeat TC In 3 YRs"    j Hepatic Steatosis     Dr. HARLEY Choe; 7/8/16 Bilateral Renal U/S = Fatty Liver Changes But Otherwise Normal    j Mildly Elevated ALT Level 5/11/17     Will Monitor    k Low Female Testosterone And DHEA Levels     5/23/17 Referred To Dr. Gabriela Lewis    k Overactive bladder     7/8/16 Bilateral Renal U/S = Fatty Liver Changes But Otherwise Normal    k Postmenopausal On Chronic HRT     k Recurrent UTIs     7/8/16 Bilateral Renal U/S = Fatty Liver Changes But Otherwise Normal    l H/O Right Foot Surgery 3/9/16     Dr. John armstrong Lumbar Spinal DDD     l Right 3rd Finger Arthropathy With Edema     Dr. Claudio Mahmood On 09/2016 Referred Her To Dr. Blaine Garcia For " Spontaneous Ecchymosis    m BLE Peripheral Neuropathy     8/4/16 Restarted Lyrica 75 Mg With BMP In 2 Weeks (To Check Na+ Level)    m Chronic Fatigue     m Recurrent Headaches     o Allergic Rhinosinusitis     o Tongue papillae hypertrophy     q BLE Varicose veins     q Chronic BLE edema     q Vitamin D deficiency     Wellness Visit 5/17/2017        Past Surgical History:   Procedure Laterality Date    CARDIAC SURGERY  3yrs ago    CATARACT EXTRACTION      x 2    CORONARY ANGIOPLASTY WITH STENT PLACEMENT  10/2011    FOOT SURGERY      TONSILLECTOMY      TOTAL ABDOMINAL HYSTERECTOMY W/ BILATERAL SALPINGOOPHORECTOMY N/A     TOTAL HIP ARTHROPLASTY Left 9 yrs ago       Current Outpatient Prescriptions   Medication Sig    ASPIRIN (ASPIR-81 ORAL) Take 81 mg by mouth once daily at 6am.     atorvastatin (LIPITOR) 80 MG tablet Take 1 tablet (80 mg total) by mouth nightly.    bifidobacterium infantis (ALIGN) 4 mg Cap Take 4 mg by mouth Daily. ALIGN 4 MG CAPS    biotin-silicon diox-L-cysteine 5,000 mcg-100 mg- 50 mg Tab BIOTIN 5000 CAPS    black cohosh 40 mg Tab Take 40 mg by mouth 2 (two) times daily.    butalbital-acetaminophen-caffeine -40 mg (FIORICET, ESGIC) -40 mg per tablet Take 1-2 tablets by mouth as directed. 1-2 po q 4 hr prn headache; maximum of 6 per 24 hr    BYSTOLIC 5 mg Tab TAKE 1 TABLET BY MOUTH EVERY DAY    CALCIUM CARBONATE/VITAMIN D3 (VITAMIN D-3 ORAL) VITAMIN D3 TABS    chlorhexidine (HIBICLENS) 4 % external liquid Apply topically 2 (two) times daily. Wash affected area BID    desloratadine (CLARINEX) 5 mg tablet Take 5 mg by mouth once daily.    escitalopram oxalate (LEXAPRO) 20 MG tablet TAKE 1 TABLET (20 MG TOTAL) BY MOUTH EVERY EVENING.    esomeprazole (NEXIUM) 40 MG capsule Take 40 mg by mouth before breakfast.    furosemide (LASIX) 20 MG tablet Take 1 tablet in the mornings up to 3 days a week if needed for leg edema    gabapentin (NEURONTIN) 100 MG capsule  Take 1 capsule (100 mg total) by mouth 2 (two) times daily.    hydrALAZINE (APRESOLINE) 10 MG tablet TAKE ONE TABLET BY MOUTH TWICE DAILY    hydrocodone-acetaminophen 5-325mg (NORCO) 5-325 mg per tablet Take 1 tablet by mouth every 4 (four) hours as needed for Pain.    levothyroxine (SYNTHROID) 50 MCG tablet Take 50 mcg by mouth once daily.    LYRICA 75 mg capsule TAKE 1 CAPSULE BY MOUTH 2 TO 3 TIMES DAILY AS NEEDED    LYRICA 75 mg capsule TAKE 1 CAPSULE BY MOUTH 2 TO 3 TIMES DAILY AS NEEDED    mecobal-levomefolat Ca-B6 phos 3-35-2 mg Tab TAKE ONE TABLET BY MOUTH ONE TIME DAILY    montelukast (SINGULAIR) 10 mg tablet Take 10 mg by mouth once daily. SINGULAIR 10 MG TABS    mupirocin (BACTROBAN) 2 % ointment Apply topically 2 (two) times daily. Apply topically to affected area bid prn    mupirocin calcium 2% (BACTROBAN) 2 % cream Apply to affected area 3 times daily    nitroGLYCERIN (NITROSTAT) 0.4 MG SL tablet Place 1 tablet (0.4 mg total) under the tongue as directed. 1 SL every 5 minutes prn chest pain; Call 911 for chest pain not relieved with 3 NTG tablets    nutritional supplement - fiber Liqd JUICE PLUS FIBRE LIQD    nystatin (MYCOSTATIN) 100,000 unit/mL suspension Take 5 mLs (500,000 Units total) by mouth 4 (four) times daily. 5 mL swish and swallow qid    olmesartan (BENICAR) 40 MG tablet Take 1 tablet (40 mg total) by mouth once daily.    polyethylene glycol (GLYCOLAX) 17 gram PwPk Take 17 g by mouth 2 (two) times daily as needed.    potassium chloride SA (K-DUR,KLOR-CON) 20 MEQ tablet Take 1 tablet in the mornings up to 3 days a week only on days furosemide is taken    PREMARIN 0.625 mg tablet TAKE 1 TABLET (0.625 MG TOTAL) BY MOUTH ONCE DAILY.    silver sulfADIAZINE 1% (SILVADENE) 1 % cream Apply topically 2 (two) times daily.     No current facility-administered medications for this visit.        Review of patient's allergies indicates:   Allergen Reactions    Azithromycin Diarrhea     "Clarithromycin Diarrhea    Codeine      Other reaction(s): makes "sick"    Hydralazine analogues      Increased urinary frequency    Hydrochlorothiazide      Significantly worsens her hyponatremia    Iodinated contrast media - iv dye     Levofloxacin Diarrhea    Moxifloxacin Diarrhea    Sulfamethoxazole-trimethoprim      Other reaction(s): diarrhea       Family History   Problem Relation Age of Onset    Heart disease Father     Cancer Mother      ovarian    Hypertension Mother     Cancer Sister      ovarian    Hypertension Sister     Thyroid disease Daughter     Hyperlipidemia Sister        Social History     Social History    Marital status:      Spouse name: N/A    Number of children: N/A    Years of education: N/A     Occupational History    Not on file.     Social History Main Topics    Smoking status: Never Smoker    Smokeless tobacco: Never Used    Alcohol use No    Drug use: No    Sexual activity: Not on file     Other Topics Concern    Not on file     Social History Narrative    No narrative on file       Chief Complaint:   No chief complaint on file.      Interval history: Is a 76-year-old patient mine comes in with new onset bilateral knee pain.  I've seen her for her knees in the past.  She had a fall about 3 weeks ago and landed directly onto both knees.  Her left knee has always been the worst.  She last had an injection in her left knee back in November and worked great until she fell.  Pain today as an 8 out of 10.    History of present illness: The left knee is giving her more trouble again.  Her last injection was back in March and lasted for about 2 months.  Pain over the last couple weeks.  Pain is up to an 8 out of 10.  Gets occasional fluid on both knees.      Review of Systems:    Constitution: Negative for chills, fever, and sweats.  Negative for unexplained weight loss.    HENT:  Negative for headaches and blurry vision.    Cardiovascular:Negative for chest " pain or irregular heart beat. Negative for hypertension.    Respiratory:  Negative for cough and shortness of breath.    Gastrointestinal: Negative for abdominal pain, heartburn, melena, nausea, and vomitting.    Genitourinary:  Negative bladder incontinence and dysuria.    Musculoskeletal:  See HPI    Neurological: Negative for numbness.    Psychiatric/Behavioral: Negative for depression.  The patient is not nervous/anxious.      Endocrine: Negative for polyuria    Hematologic/Lymphatic: Negative for bleeding problem.  Does not bruise/bleed easily.    Skin: Negative for poor would healing and rash      Physical Examination:    Vital Signs:    Vitals:    06/14/17 1100   BP: 132/68   Pulse: 76       Body mass index is 30.18 kg/m².    This a well-developed, well nourished patient in no acute distress.  They are alert and oriented and cooperative to examination.  Pt. walks without an antalgic gait.      Examination of bilateral knees shows no rashes or erythema. There are bruises and swelling over the anterior both knees. Patient has full range of motion from 0-130°. Patient is nontender to palpation over lateral joint line and nontender to palpation over the medial joint line. Patient has a - Lachman exam, - anterior drawer exam, and - posterior drawer exam. - Gaye's exam. Knee is stable to varus and valgus stress. 5 out of 5 motor strength. Palpable distal pulses. Intact light touch sensation.  Moderate Patellofemoral crepitus      X-rays: X-rays of both knees are reviewed which show moderate arthritic changes tricompartmentally with full-thickness patellofemoral arthritis on the left     Assessment:: Bilateral knee arthritis    Plan:  I reviewed the findings with her today.  Patient like another left knee steroid injection.  The right knee is still doing well.  Follow-up as needed.  Talked about possible knee replacement in the future if needed.    This note was created using Dragon voice recognition software  that occasionally misinterpreted phrases or words.    Consult note is delivered via Epic messaging service.

## 2017-06-27 PROBLEM — I87.2 PERIPHERAL VENOUS INSUFFICIENCY: Status: ACTIVE | Noted: 2017-06-27

## 2017-06-27 PROBLEM — L97.912 NON-PRESSURE CHRONIC ULCER OF RIGHT LOWER LEG WITH FAT LAYER EXPOSED: Status: ACTIVE | Noted: 2017-05-17

## 2017-07-12 ENCOUNTER — LAB VISIT (OUTPATIENT)
Dept: LAB | Facility: HOSPITAL | Age: 77
End: 2017-07-12
Attending: INTERNAL MEDICINE
Payer: MEDICARE

## 2017-07-12 DIAGNOSIS — E87.1 HYPONATREMIA: ICD-10-CM

## 2017-07-12 DIAGNOSIS — Z79.899 ENCOUNTER FOR LONG-TERM (CURRENT) USE OF MEDICATIONS: ICD-10-CM

## 2017-07-12 DIAGNOSIS — Z00.00 ENCOUNTER FOR WELLNESS EXAMINATION: ICD-10-CM

## 2017-07-12 DIAGNOSIS — E03.9 HYPOTHYROIDISM, UNSPECIFIED TYPE: ICD-10-CM

## 2017-07-12 LAB
ALBUMIN SERPL BCP-MCNC: 3.1 G/DL
ALP SERPL-CCNC: 73 U/L
ALT SERPL W/O P-5'-P-CCNC: 28 U/L
ANION GAP SERPL CALC-SCNC: 7 MMOL/L
AST SERPL-CCNC: 20 U/L
BILIRUB SERPL-MCNC: 0.5 MG/DL
BUN SERPL-MCNC: 10 MG/DL
CALCIUM SERPL-MCNC: 9 MG/DL
CHLORIDE SERPL-SCNC: 103 MMOL/L
CO2 SERPL-SCNC: 25 MMOL/L
CREAT SERPL-MCNC: 0.8 MG/DL
EST. GFR  (AFRICAN AMERICAN): >60 ML/MIN/1.73 M^2
EST. GFR  (NON AFRICAN AMERICAN): >60 ML/MIN/1.73 M^2
GLUCOSE SERPL-MCNC: 133 MG/DL
POTASSIUM SERPL-SCNC: 4.5 MMOL/L
PROT SERPL-MCNC: 5.8 G/DL
SODIUM SERPL-SCNC: 135 MMOL/L
TSH SERPL DL<=0.005 MIU/L-ACNC: 3.48 UIU/ML
URATE SERPL-MCNC: 7.2 MG/DL

## 2017-07-12 PROCEDURE — 84443 ASSAY THYROID STIM HORMONE: CPT

## 2017-07-12 PROCEDURE — 84550 ASSAY OF BLOOD/URIC ACID: CPT

## 2017-07-12 PROCEDURE — 80053 COMPREHEN METABOLIC PANEL: CPT

## 2017-07-12 PROCEDURE — 36415 COLL VENOUS BLD VENIPUNCTURE: CPT | Mod: PO

## 2017-07-27 ENCOUNTER — OFFICE VISIT (OUTPATIENT)
Dept: ENDOCRINOLOGY | Facility: CLINIC | Age: 77
End: 2017-07-27
Payer: MEDICARE

## 2017-07-27 VITALS
DIASTOLIC BLOOD PRESSURE: 64 MMHG | SYSTOLIC BLOOD PRESSURE: 136 MMHG | HEART RATE: 76 BPM | HEIGHT: 66 IN | BODY MASS INDEX: 30.39 KG/M2 | WEIGHT: 189.06 LBS

## 2017-07-27 DIAGNOSIS — E03.9 HYPOTHYROIDISM, UNSPECIFIED TYPE: Primary | ICD-10-CM

## 2017-07-27 DIAGNOSIS — E87.1 HYPONATREMIA: ICD-10-CM

## 2017-07-27 DIAGNOSIS — R23.2 FLUSHING: ICD-10-CM

## 2017-07-27 PROCEDURE — 99214 OFFICE O/P EST MOD 30 MIN: CPT | Mod: S$PBB,,, | Performed by: INTERNAL MEDICINE

## 2017-07-27 PROCEDURE — 99212 OFFICE O/P EST SF 10 MIN: CPT | Mod: PBBFAC,PO | Performed by: INTERNAL MEDICINE

## 2017-07-27 PROCEDURE — 1159F MED LIST DOCD IN RCRD: CPT | Mod: ,,, | Performed by: INTERNAL MEDICINE

## 2017-07-27 PROCEDURE — 99999 PR PBB SHADOW E&M-EST. PATIENT-LVL II: CPT | Mod: PBBFAC,,, | Performed by: INTERNAL MEDICINE

## 2017-07-27 NOTE — PROGRESS NOTES
CHIEF COMPLAINT: Hypothyroid  76 year old being seen as a f/u Hypothyroid since late 40's. On synthroid 50 mcg. States that for the past 4-6 months has been having hyperhydrosis- generalized. States more often in AM. No palpitations. No tremors. No diarrhea or constipation. States that occasionally gets flushing.              PAST MEDICAL HISTORY/PAST SURGICAL HISTORY:  Reviewed in EPIC    SOCIAL HISTORY: No T/A    FAMILY HISTORY:  Daughter has hyperthyroidism. No DM    MEDICATIONS/ALLERGIES: The patient's MedCard has been updated and reviewed.      ROS:   Constitutional: No recent significant weight change  Eyes: No recent visual changes  ENT: No dysphagia  Cardiovascular: Denies current anginal symptoms  Respiratory: Denies current respiratory difficulty  Gastrointestinal: Denies recent bowel disturbances  GenitoUrinary - No dysuria  Skin: No new skin rash  Neurologic: No focal neurologic complaints  Remainder ROS negative        PE:    GENERAL: Well developed, well nourished.  NECK: Supple, trachea midline, No palpable thyroid nodules  CHEST: Resp even and unlabored, CTA bilateral.  CARDIAC: RRR, S1, S2 heard, no murmurs, rubs, S3, or S4      Results for TRINA ALLEN (MRN [id number]) as of 7/27/2017 09:55   Ref. Range 7/17/2017 09:23   Sodium Latest Ref Range: 136 - 145 mmol/L 137   Potassium Latest Ref Range: 3.5 - 5.1 mmol/L 4.4   Chloride Latest Ref Range: 95 - 110 mmol/L 104   CO2 Latest Ref Range: 22 - 31 mmol/L 25   Anion Gap Latest Ref Range: 8 - 16 mmol/L 8   BUN, Bld Latest Ref Range: 7 - 18 mg/dL 10   Creatinine Latest Ref Range: 0.50 - 1.40 mg/dL 0.57   eGFR if non African American Latest Ref Range: >60 mL/min/1.73 m^2 >60   eGFR if  Latest Ref Range: >60 mL/min/1.73 m^2 >60   Glucose Latest Ref Range: 70 - 110 mg/dL 77   Calcium Latest Ref Range: 8.4 - 10.2 mg/dL 9.5   Alkaline Phosphatase Latest Ref Range: 38 - 145 U/L 83   Total Protein Latest Ref Range: 6.0 - 8.4 g/dL 6.0    Albumin Latest Ref Range: 3.5 - 5.2 g/dL 3.7   Total Bilirubin Latest Ref Range: 0.2 - 1.3 mg/dL 0.8   AST Latest Ref Range: 14 - 36 U/L 28   ALT Latest Ref Range: 10 - 44 U/L 38   Triglycerides Latest Ref Range: 30 - 150 mg/dL 192 (H)   Cholesterol Latest Ref Range: 120 - 199 mg/dL 161   HDL Latest Ref Range: 40 - 75 mg/dL 69   LDL Cholesterol Latest Ref Range: 63.0 - 159.0 mg/dL 53.6 (L)   Total Cholesterol/HDL Ratio Latest Ref Range: 2.0 - 5.0  2.3   CPK Latest Ref Range: 55 - 170 U/L 67         ASSESSMENT/PLAN:  1. Hypothyroid- Having hyperhydrosis. ast TSH WNL. On biotin which can interfere with thyroid labs Will have her hold biotin and recheck.      2. Hyponatremia- Na stable. Continue fluid restriction    3. Flushing/Diaphoresis- See labs below    FOLLOWUP  4 weeks- TSH  24 hour urine 5HIAA, metanephrines  F/U 6 months- CMP, TSH

## 2017-08-31 ENCOUNTER — LAB VISIT (OUTPATIENT)
Dept: LAB | Facility: HOSPITAL | Age: 77
End: 2017-08-31
Attending: INTERNAL MEDICINE
Payer: MEDICARE

## 2017-08-31 DIAGNOSIS — E03.9 HYPOTHYROIDISM, UNSPECIFIED TYPE: ICD-10-CM

## 2017-08-31 DIAGNOSIS — E87.1 HYPONATREMIA: ICD-10-CM

## 2017-08-31 LAB — TSH SERPL DL<=0.005 MIU/L-ACNC: 2.26 UIU/ML

## 2017-08-31 PROCEDURE — 84443 ASSAY THYROID STIM HORMONE: CPT

## 2017-08-31 PROCEDURE — 36415 COLL VENOUS BLD VENIPUNCTURE: CPT | Mod: PO

## 2018-01-08 ENCOUNTER — LAB VISIT (OUTPATIENT)
Dept: LAB | Facility: HOSPITAL | Age: 78
End: 2018-01-08
Attending: INTERNAL MEDICINE
Payer: MEDICARE

## 2018-01-08 DIAGNOSIS — E03.9 HYPOTHYROIDISM, UNSPECIFIED TYPE: ICD-10-CM

## 2018-01-08 DIAGNOSIS — E87.1 HYPONATREMIA: ICD-10-CM

## 2018-01-08 LAB
ALBUMIN SERPL BCP-MCNC: 3.1 G/DL
ALP SERPL-CCNC: 89 U/L
ALT SERPL W/O P-5'-P-CCNC: 23 U/L
ANION GAP SERPL CALC-SCNC: 5 MMOL/L
AST SERPL-CCNC: 18 U/L
BILIRUB SERPL-MCNC: 0.5 MG/DL
BUN SERPL-MCNC: 10 MG/DL
CALCIUM SERPL-MCNC: 9.1 MG/DL
CHLORIDE SERPL-SCNC: 104 MMOL/L
CO2 SERPL-SCNC: 28 MMOL/L
CREAT SERPL-MCNC: 0.7 MG/DL
EST. GFR  (AFRICAN AMERICAN): >60 ML/MIN/1.73 M^2
EST. GFR  (NON AFRICAN AMERICAN): >60 ML/MIN/1.73 M^2
GLUCOSE SERPL-MCNC: 85 MG/DL
POTASSIUM SERPL-SCNC: 4.6 MMOL/L
PROT SERPL-MCNC: 6 G/DL
SODIUM SERPL-SCNC: 137 MMOL/L
TSH SERPL DL<=0.005 MIU/L-ACNC: 3.81 UIU/ML

## 2018-01-08 PROCEDURE — 80053 COMPREHEN METABOLIC PANEL: CPT

## 2018-01-08 PROCEDURE — 84443 ASSAY THYROID STIM HORMONE: CPT

## 2018-01-08 PROCEDURE — 36415 COLL VENOUS BLD VENIPUNCTURE: CPT | Mod: PO

## 2018-01-11 ENCOUNTER — OFFICE VISIT (OUTPATIENT)
Dept: ENDOCRINOLOGY | Facility: CLINIC | Age: 78
End: 2018-01-11
Payer: MEDICARE

## 2018-01-11 VITALS
SYSTOLIC BLOOD PRESSURE: 138 MMHG | BODY MASS INDEX: 28.37 KG/M2 | WEIGHT: 176.5 LBS | DIASTOLIC BLOOD PRESSURE: 82 MMHG | HEART RATE: 70 BPM | HEIGHT: 66 IN

## 2018-01-11 DIAGNOSIS — E87.1 HYPONATREMIA: ICD-10-CM

## 2018-01-11 DIAGNOSIS — R53.83 FATIGUE, UNSPECIFIED TYPE: ICD-10-CM

## 2018-01-11 DIAGNOSIS — E03.9 HYPOTHYROIDISM, UNSPECIFIED TYPE: Primary | ICD-10-CM

## 2018-01-11 PROCEDURE — 99999 PR PBB SHADOW E&M-EST. PATIENT-LVL IV: CPT | Mod: PBBFAC,,, | Performed by: INTERNAL MEDICINE

## 2018-01-11 PROCEDURE — 99214 OFFICE O/P EST MOD 30 MIN: CPT | Mod: PBBFAC,PO | Performed by: INTERNAL MEDICINE

## 2018-01-11 PROCEDURE — 99214 OFFICE O/P EST MOD 30 MIN: CPT | Mod: S$PBB,,, | Performed by: INTERNAL MEDICINE

## 2018-01-11 RX ORDER — CLOPIDOGREL BISULFATE 75 MG/1
75 TABLET ORAL DAILY
COMMUNITY
Start: 2017-12-27 | End: 2020-06-11 | Stop reason: CLARIF

## 2018-01-11 RX ORDER — PANTOPRAZOLE SODIUM 40 MG/1
40 TABLET, DELAYED RELEASE ORAL DAILY
COMMUNITY
Start: 2017-12-23

## 2018-01-11 NOTE — PROGRESS NOTES
CHIEF COMPLAINT: Hypothyroid  77 year old being seen as a f/u Hypothyroid since late 40's. On synthroid 50 mcg. States the diaphoresis is somewhat better. She is fatigued that is increased from before. + fatigue. Scheduled to see CT surgery. Getting w/u for possible cardiac stent vs bypass surgery               PAST MEDICAL HISTORY/PAST SURGICAL HISTORY:  Reviewed in [employer]    SOCIAL HISTORY: No T/A    FAMILY HISTORY:  Daughter has hyperthyroidism. No DM    MEDICATIONS/ALLERGIES: The patient's MedCard has been updated and reviewed.      ROS:   Constitutional: No recent significant weight change  Eyes: No recent visual changes  ENT: No dysphagia  Cardiovascular: Denies current anginal symptoms  Respiratory: Denies current respiratory difficulty  Gastrointestinal: Denies recent bowel disturbances  GenitoUrinary - No dysuria  Skin: No new skin rash  Neurologic: No focal neurologic complaints  Remainder ROS negative        PE:    GENERAL: Well developed, well nourished.  NECK: Supple, trachea midline, No palpable thyroid nodules  CHEST: Resp even and unlabored, CTA bilateral.  CARDIAC: RRR, S1, S2 heard, no murmurs, rubs, S3, or S4      Results for TIFFANY TRINA R (MRN [id number]) as of 1/11/2018 10:44   Ref. Range 1/8/2018 09:30   Sodium Latest Ref Range: 136 - 145 mmol/L 137   Potassium Latest Ref Range: 3.5 - 5.1 mmol/L 4.6   Chloride Latest Ref Range: 95 - 110 mmol/L 104   CO2 Latest Ref Range: 23 - 29 mmol/L 28   Anion Gap Latest Ref Range: 8 - 16 mmol/L 5 (L)   BUN, Bld Latest Ref Range: 8 - 23 mg/dL 10   Creatinine Latest Ref Range: 0.5 - 1.4 mg/dL 0.7   eGFR if non African American Latest Ref Range: >60 mL/min/1.73 m^2 >60.0   eGFR if African American Latest Ref Range: >60 mL/min/1.73 m^2 >60.0   Glucose Latest Ref Range: 70 - 110 mg/dL 85   Calcium Latest Ref Range: 8.7 - 10.5 mg/dL 9.1   Alkaline Phosphatase Latest Ref Range: 55 - 135 U/L 89   Total Protein Latest Ref Range: 6.0 - 8.4 g/dL 6.0   Albumin Latest  Ref Range: 3.5 - 5.2 g/dL 3.1 (L)   Total Bilirubin Latest Ref Range: 0.1 - 1.0 mg/dL 0.5   AST Latest Ref Range: 10 - 40 U/L 18   ALT Latest Ref Range: 10 - 44 U/L 23   TSH Latest Ref Range: 0.400 - 4.000 uIU/mL 3.809         ASSESSMENT/PLAN:  1. Hypothyroid- TSH WNL. She is having fatigue but unsure if could be a cardiac etiology if has another blockage. For now will keep the dose the same and recheck in 3 months      2. Hyponatremia- Na stable. Continue fluid restriction    3. Fatigue- see #1. Also on multiple medications that can contribute to fatigue.     FOLLOWUP  TSH- 3 months  F/U 6 months- CMP, TSH

## 2018-01-25 ENCOUNTER — OFFICE VISIT (OUTPATIENT)
Dept: VASCULAR SURGERY | Facility: CLINIC | Age: 78
End: 2018-01-25
Payer: MEDICARE

## 2018-01-25 VITALS
WEIGHT: 175.25 LBS | SYSTOLIC BLOOD PRESSURE: 157 MMHG | HEART RATE: 68 BPM | BODY MASS INDEX: 28.16 KG/M2 | DIASTOLIC BLOOD PRESSURE: 67 MMHG | RESPIRATION RATE: 18 BRPM | HEIGHT: 66 IN

## 2018-01-25 DIAGNOSIS — I25.10 CORONARY ARTERY DISEASE INVOLVING NATIVE CORONARY ARTERY OF NATIVE HEART WITHOUT ANGINA PECTORIS: Primary | ICD-10-CM

## 2018-01-25 PROCEDURE — 99205 OFFICE O/P NEW HI 60 MIN: CPT | Mod: S$PBB,,, | Performed by: THORACIC SURGERY (CARDIOTHORACIC VASCULAR SURGERY)

## 2018-01-25 PROCEDURE — 99213 OFFICE O/P EST LOW 20 MIN: CPT | Mod: PBBFAC,PO | Performed by: THORACIC SURGERY (CARDIOTHORACIC VASCULAR SURGERY)

## 2018-01-25 PROCEDURE — 99999 PR PBB SHADOW E&M-EST. PATIENT-LVL III: CPT | Mod: PBBFAC,,, | Performed by: THORACIC SURGERY (CARDIOTHORACIC VASCULAR SURGERY)

## 2018-01-25 NOTE — PROGRESS NOTES
OFFICE VISIT NOTE    I was asked to see this patient by Dr. Tanner.  The patient is a 77-year-old   female with known coronary artery disease who has undergone angioplasty and   stenting of the left main coronary artery and the ostium and proximal right   coronary arteries.  The patient denies any chest pain.  She does have occasional   shortness of breath.  She had an abnormality on PET perfusion imaging and was   taken to the cardiac cath lab where coronary angiography was performed.  The   left main coronary artery stent had in-stent restenosis.  However, it was   difficult to quantify the amount of stenosis.  FFR was done and the value   obtained was 0.83 with abnormal being below 0.8.  The right coronary artery did   not have any significant in-stent restenosis.  Left ventricular function was   normal.    PAST MEDICAL HISTORY:  Coronary artery disease, angioplasty and stenting of   coronary arteries, carotid artery stenosis, valvuloplasty, mitral valve prolapse, hypertension, SIADH with chronic hyponatremia, homocysteinemia, hypercholesterolemia, mild hypertriglyceridemia, compression fracture of lumbar vertebra, hypothyroidism, coagulopathy with factor VIII disorder, spontaneous ecchymoses, mild chronic dyspnea on exertion, chronic diarrhea, history of colon polyps, hepatic steatosis, mildly elevated ALT levels, low female testosterone and DHEA levels, overactive bladder,   recurrent urinary tract infections, degenerative disk disease of the lumbar   spine, bilateral peripheral neuropathy, chronic fatigue and bilateral lower   extremity varicose veins, chronic bilateral lower extremity edema, vitamin D   deficiency.      PAST SURGICAL HISTORY:  Left total hip arthroplasty, percutaneous coronary   intervention, cataract extraction, tonsillectomy, total abdominal hysterectomy   with bilateral salpingo-oophorectomy.    ALLERGIES:  Azithromycin, clarithromycin, codeine, hydralazine,   hydrochlorothiazide, iodinated  contrast, levofloxacin, moxifloxacin, and   Bactrim.    MEDICATIONS:  Aspirin, Lipitor, Bystolic, calcium carbonate, Plavix, Clarinex,   Lexapro, Lasix, Neurontin, Apresoline, Norco, Synthroid, Lyrica, Singulair,   Nitrostat p.r.n., olmesartan, Protonix, potassium chloride, Premarin.    FAMILY HISTORY:  Hypothyroidism, coronary artery disease, ovarian cancer,   hyperlipidemia and hypertension.    SOCIAL HISTORY:  She never smoked cigarettes.  Denies alcohol use.    REVISION OF SYSTEMS:  She complains of mild shortness of breath, but at this   time is denying chest pain.  She complains of easy bruising and diffuse joint   pains.  All other systems are reviewed and are negative.    PHYSICAL EXAMINATION:  VITAL SIGNS:  Blood pressure is 157/67, respiratory rate is 18, heart rate is   68, height 5 feet 6 inches, and weight 79.5.  She is awake and alert, in no   apparent distress.  HEENT:  Head is normocephalic.  Pupils are equal, round and reactive.  She has   palpebral edema.  No conjunctivitis.  NECK:  Supple.  Trachea midline.  No masses.  LUNGS:  Clear.  HEART:  Has a regular rate and rhythm.  ABDOMEN:  Soft and nontender.  No masses.  Bowel sounds are present.  EXTREMITIES:  She has trace edema of the lower extremities at this time and feet   are well perfused.  She has ecchymoses of the forearms bilaterally.  NEUROLOGIC:  Awake, alert and oriented.  No lateralizing neurologic deficits.    PET scan imaging of the heart was abnormal.    Left heart catheterization showed normal left ventricular function with in-stent   restenosis of the left main coronary artery stent.    IMPRESSION:  1.  Coronary artery disease.  2.  Status post percutaneous coronary intervention.  3.  Status post stenting of the left main coronary artery.  4.  Status post stenting of the right coronary artery.  5.  In-stent restenosis of the left main coronary artery.  6.  Carotid artery stenoses.  7.  Status post bilateral carotid artery  stenting.  8.  Cardiac valvulopathy  9.  Hypertension.  10.  SIADH.  11.  Chronic hyponatremia.  12.  Homocysteinemia.  13.  Hypercholesterolemia.  14.  hypertriglyceridemia.   15.  Hypothyroidism.  16.  Coagulopathy.  17.  Factor VIII disorder.  18.  Compression fracture of the lumbar vertebra.  19.  Dyspnea on exertion.  20.  Chronic diarrhea.  21.  Colon polyps.  22.  Hepatic steatosis.  23.  Elevated liver function tests.  24.  Peripheral neuropathy.  25.  Edema.  25.  Varicose veins.  26.  Vitamin D deficiency.    RECOMMENDATIONS:  The patient does not have any significant in-stent restenosis   in the right coronary artery.  She has in-stent restenosis of the left main   coronary artery, but it is very difficult to quantify.  It is heavily calcified.    FFR was done and this has yielded a value of 0.83 with an abnormal value being   below 0.8.  It seems as though the patient had told Dr. Tanner that she was   having chest pains, but she denies this to me.  I asked her about this with   direct questioning.  She initially stated that she had no shortness of breath,   but subsequently said that she does get occasional mild shortness of breath.    She has several comorbidities such as SIADH with chronic hyponatremia,   coagulopathy with factor VIII disorder, although I am unsure as to what exactly   the disorder is, hepatic steatosis with elevated liver function tests, easy   Bruising.      GILBERTO  dd: 01/25/2018 16:40:27 (CST)  td: 01/26/2018 07:03:09 (CST)  Doc ID   #[id number]  Job ID #[id number]    CC:

## 2018-01-25 NOTE — LETTER
January 25, 2018      Ezra Tanner MD  [address] [facility] [address]           [facility]  [address] [facility] [address] [place] [address]  Phone: [phone]          Patient: Ela Mascorro   MR Number: [id number]   YOB: 1940   Date of Visit: 1/25/2018       Dear Dr. Ezra Tanner:    Thank you for referring Ela Mascorro to me for evaluation. Attached you will find relevant portions of my assessment and plan of care.    If you have questions, please do not hesitate to call me. I look forward to following Ela Mascorro along with you.    Sincerely,    Tim Velarde MD    Enclosure  CC:  No Recipients    If you would like to receive this communication electronically, please contact [email or web address] or [phone] to request more information on [employer] access.    For providers and/or their staff who would like to refer a patient to [facility], please contact us through our one-stop-shop provider referral line, Brianda Cobos, at [phone].    If you feel you have received this communication in error or would no longer like to receive these types of communications, please e-mail [email or web address]

## 2018-01-26 NOTE — PROGRESS NOTES
OFFICE VISIT NOTE    CONTINUATION    I will discuss this with Dr. Tanner.  We could certainly consider coronary artery   bypass grafting to the LAD and the circumflex.  The ostium of the left main   coronary artery is heavily calcified and there is disease.  The angle at which   it is coming off requires that the catheter be engaged and deep seated.  This   makes evaluation more difficult.  I am unsure as to what sort of the defect was   seen on the PET scan.  I will discuss this with Dr. Tanner and further   recommendations will follow.      GILBERTO  dd: 01/25/2018 16:42:57 (CST)  td: 01/26/2018 07:20:27 (CST)  Doc ID   #[id number]  Job ID #[id number]    CC: LISA TANNER M.D.

## 2018-03-29 ENCOUNTER — TELEPHONE (OUTPATIENT)
Dept: VASCULAR SURGERY | Facility: CLINIC | Age: 78
End: 2018-03-29

## 2018-03-29 NOTE — TELEPHONE ENCOUNTER
----- Message from Johanna Quintero sent at 3/29/2018 11:20 AM CDT -----  Patient has an appointment today that she needs to reschedule due to feeling sick and throwing up. Please call back to reschedule at [phone].

## 2018-04-12 ENCOUNTER — OFFICE VISIT (OUTPATIENT)
Dept: VASCULAR SURGERY | Facility: CLINIC | Age: 78
End: 2018-04-12
Payer: MEDICARE

## 2018-04-12 ENCOUNTER — LAB VISIT (OUTPATIENT)
Dept: LAB | Facility: HOSPITAL | Age: 78
End: 2018-04-12
Attending: INTERNAL MEDICINE
Payer: MEDICARE

## 2018-04-12 VITALS
HEART RATE: 72 BPM | WEIGHT: 163 LBS | HEIGHT: 66 IN | DIASTOLIC BLOOD PRESSURE: 66 MMHG | SYSTOLIC BLOOD PRESSURE: 121 MMHG | BODY MASS INDEX: 26.2 KG/M2

## 2018-04-12 DIAGNOSIS — I65.23 BILATERAL CAROTID ARTERY STENOSIS: Primary | ICD-10-CM

## 2018-04-12 DIAGNOSIS — E03.9 HYPOTHYROIDISM, UNSPECIFIED TYPE: ICD-10-CM

## 2018-04-12 LAB — TSH SERPL DL<=0.005 MIU/L-ACNC: 2.71 UIU/ML

## 2018-04-12 PROCEDURE — 99213 OFFICE O/P EST LOW 20 MIN: CPT | Mod: PBBFAC,PO | Performed by: THORACIC SURGERY (CARDIOTHORACIC VASCULAR SURGERY)

## 2018-04-12 PROCEDURE — 84443 ASSAY THYROID STIM HORMONE: CPT

## 2018-04-12 PROCEDURE — 99999 PR PBB SHADOW E&M-EST. PATIENT-LVL III: CPT | Mod: PBBFAC,,, | Performed by: THORACIC SURGERY (CARDIOTHORACIC VASCULAR SURGERY)

## 2018-04-12 PROCEDURE — 36415 COLL VENOUS BLD VENIPUNCTURE: CPT | Mod: PO

## 2018-04-12 PROCEDURE — 99215 OFFICE O/P EST HI 40 MIN: CPT | Mod: S$PBB,,, | Performed by: THORACIC SURGERY (CARDIOTHORACIC VASCULAR SURGERY)

## 2018-04-12 NOTE — PROGRESS NOTES
I was asked to see this patient by Dr. Tanner.  The patient is a 77-year-old   female with known coronary artery disease who has undergone angioplasty and   stenting of the left main coronary artery and the ostium and proximal right   coronary arteries.  The patient denies any chest pain.  She does have occasional   shortness of breath.  She had an abnormality on PET perfusion imaging and was   taken to the cardiac cath lab where coronary angiography was performed.  The   left main coronary artery stent had in-stent restenosis.  However, it was   difficult to quantify the amount of stenosis.  FFR was done and the value   obtained was 0.83 with abnormal being below 0.8.  The right coronary artery did   not have any significant in-stent restenosis.  Left ventricular function was   Normal.    The patient has carotid artery disease and CTA done in 1/18 was reported as 70%   HERRERA stenosis. She denies amaurosis fugax, TIA symptoms or stroke.     PAST MEDICAL HISTORY:  Coronary artery disease, angioplasty and stenting of   coronary arteries, carotid artery stenosis, valvuloplasty, mitral valve prolapse, hypertension, SIADH with chronic hyponatremia, homocysteinemia, hypercholesterolemia, mild hypertriglyceridemia, compression fracture of lumbar vertebra, hypothyroidism, coagulopathy with factor VIII disorder, spontaneous ecchymoses, mild chronic dyspnea on exertion, chronic diarrhea, history of colon polyps, hepatic steatosis, mildly elevated ALT levels, low female testosterone and DHEA levels, overactive bladder,   recurrent urinary tract infections, degenerative disk disease of the lumbar   spine, bilateral peripheral neuropathy, chronic fatigue and bilateral lower   extremity varicose veins, chronic bilateral lower extremity edema, vitamin D   deficiency.       PAST SURGICAL HISTORY:  Left total hip arthroplasty, percutaneous coronary   intervention, cataract extraction, tonsillectomy, total abdominal hysterectomy   with  bilateral salpingo-oophorectomy.     ALLERGIES:  Azithromycin, clarithromycin, codeine, hydralazine,   hydrochlorothiazide, iodinated contrast, levofloxacin, moxifloxacin, and   Bactrim.     MEDICATIONS:  Aspirin, Lipitor, Bystolic, calcium carbonate, Plavix, Clarinex,   Lexapro, Lasix, Neurontin, Apresoline, Norco, Synthroid, Lyrica, Singulair,   Nitrostat p.r.n., olmesartan, Protonix, potassium chloride, Premarin.     FAMILY HISTORY:  Hypothyroidism, coronary artery disease, ovarian cancer,   hyperlipidemia and hypertension.     SOCIAL HISTORY:  She never smoked cigarettes.  Denies alcohol use.     REVISION OF SYSTEMS:  She complains of mild shortness of breath, but at this   time is denying chest pain.  She complains of easy bruising and diffuse joint   pains.  All other systems are reviewed and are negative.     PHYSICAL EXAMINATION:  VITAL SIGNS:  Blood pressure is 157/67, respiratory rate is 18, heart rate is   68, height 5 feet 6 inches, and weight 79.5.  She is awake and alert, in no   apparent distress.  HEENT:  Head is normocephalic.  Pupils are equal, round and reactive.  She has   palpebral edema.  No conjunctivitis.  NECK:  Supple.  Trachea midline.  No masses.  LUNGS:  Clear.  HEART:  Has a regular rate and rhythm.  ABDOMEN:  Soft and nontender.  No masses.  Bowel sounds are present.  EXTREMITIES:  She has trace edema of the lower extremities at this time and feet   are well perfused.  She has ecchymoses of the forearms bilaterally.  NEUROLOGIC:  Awake, alert and oriented.  No lateralizing neurologic deficits.     PET scan imaging of the heart was abnormal.     Left heart catheterization showed normal left ventricular function with in-stent   restenosis of the left main coronary artery stent. By FFR, this is not significant.    CTA in 1/18 showed 70% HERRERA stenosis; CTA in 6/17 was reported as >70% BICA stenoses.     IMPRESSION:  1.  Carotid artery disease.  2.  Status post percutaneous coronary  intervention.  3.  Status post stenting of the left main coronary artery.  4.  Status post stenting of the right coronary artery.  5.  In-stent restenosis of the left main coronary artery.  6.  Coronary artery stenoses.  7.  Cholelithiasis  8.  Cardiac valvulopathy  9.  Hypertension.  10.  SIADH.  11.  Chronic hyponatremia.  12.  Homocysteinemia.  13.  Hypercholesterolemia.  14.  hypertriglyceridemia.   15.  Hypothyroidism.  16.  Coagulopathy.  17.  Factor VIII disorder.  18.  Compression fracture of the lumbar vertebra.  19.  Dyspnea on exertion.  20.  Chronic diarrhea.  21.  Colon polyps.  22.  Hepatic steatosis.  23.  Elevated liver function tests.  24.  Peripheral neuropathy.  25.  Edema.  25.  Varicose veins.  26.  Vitamin D deficiency.     RECOMMENDATIONS:  The patient does not have any significant in-stent restenosis   in the right coronary artery.  She has in-stent restenosis of the left main   coronary artery, but it is very difficult to quantify.  It is heavily calcified.    FFR was done and this has yielded a value of 0.83 with an abnormal value being   below 0.8.  It seems as though the patient had told Dr. Tanner that she was   having chest pains, but she denies this to me.  I asked her about this with   direct questioning.  She initially stated that she had no shortness of breath,   but subsequently said that she does get occasional mild shortness of breath.    She has several comorbidities such as SIADH with chronic hyponatremia,   coagulopathy with factor VIII disorder, although I am unsure as to what exactly   the disorder is, hepatic steatosis with elevated liver function tests, easy   Bruising. This was discussed with Dr Tanner and we decided to hold off from CABG and treat her medically.    With regard to the carotid arteries, she wants to proceed with right carotid endarterectomy. Her  is a bit more hesitant and is not completely convinced that she should undergo endarterectomy. I explained  the results of hte ACAS study which recommended carotid endarterectomy once stenosis go greater than 60%. My practice is to usually wait until it gets to >8o%. The patient will likely need cholecystectomy as she was in the ER a few nights ago for he gallbladder. The patient wants to have the CEA and her general surgeon and anesthesiologist will probably want the carotid addressed prior to anesthesia for cholecystectomy. The patient wants the endarterectomy. I will discuss the CTA with the radiologists as the CTA in 6/17 was reported as >70% BICA stenosis and the CTA in 1/18 was reported as 70% HERRERA stenosis without change from previous study.

## 2018-04-16 ENCOUNTER — TELEPHONE (OUTPATIENT)
Dept: VASCULAR SURGERY | Facility: CLINIC | Age: 78
End: 2018-04-16

## 2018-04-16 DIAGNOSIS — I65.23 BILATERAL CAROTID ARTERY OCCLUSION: Primary | ICD-10-CM

## 2018-04-16 DIAGNOSIS — I65.23 CAROTID STENOSIS, BILATERAL: ICD-10-CM

## 2018-04-16 DIAGNOSIS — Z79.01 CURRENT USE OF LONG TERM ANTICOAGULATION: ICD-10-CM

## 2018-04-16 RX ORDER — LIDOCAINE HYDROCHLORIDE 10 MG/ML
1 INJECTION, SOLUTION EPIDURAL; INFILTRATION; INTRACAUDAL; PERINEURAL ONCE
Status: CANCELLED | OUTPATIENT
Start: 2018-04-16 | End: 2018-04-16

## 2018-04-16 NOTE — TELEPHONE ENCOUNTER
----- Message from Hollie Hernandez sent at 4/16/2018 12:06 PM CDT -----  Contact: Pt  Pt is calling states she spoke with a nurse about an appointment time and would like to speak back with Cammie. Pt can be reached at [phone] (phut)

## 2018-04-20 PROBLEM — I65.23 CAROTID STENOSIS, BILATERAL: Status: ACTIVE | Noted: 2018-04-20

## 2018-04-23 ENCOUNTER — TELEPHONE (OUTPATIENT)
Dept: VASCULAR SURGERY | Facility: CLINIC | Age: 78
End: 2018-04-23

## 2018-04-23 NOTE — TELEPHONE ENCOUNTER
----- Message from Melody Medina sent at 4/23/2018  3:27 PM CDT -----  Contact: [phone]  Patient is requesting a call back from the nurse stated she need a 2 wk post op appt, surgery date was 4/20/18.  Please call the patient upon request at phone number [phone].

## 2018-04-28 PROBLEM — R06.02 SOB (SHORTNESS OF BREATH) ON EXERTION: Status: ACTIVE | Noted: 2018-04-28

## 2018-04-28 PROBLEM — R06.02 SOB (SHORTNESS OF BREATH): Status: ACTIVE | Noted: 2018-04-28

## 2018-04-28 PROBLEM — F41.9 SEVERE ANXIETY: Status: ACTIVE | Noted: 2018-04-28

## 2018-04-28 PROBLEM — I10 UNCONTROLLED HYPERTENSION: Status: ACTIVE | Noted: 2018-04-28

## 2018-04-28 PROBLEM — R07.9 CHEST PAIN: Status: ACTIVE | Noted: 2018-04-28

## 2018-05-02 ENCOUNTER — TELEPHONE (OUTPATIENT)
Dept: VASCULAR SURGERY | Facility: CLINIC | Age: 78
End: 2018-05-02

## 2018-05-02 NOTE — TELEPHONE ENCOUNTER
----- Message from Radha Gaytan sent at 5/2/2018 10:36 AM CDT -----  Type: Needs Medical Advice    Who Called:  Patient   Symptoms (please be specific):  Surgical are hurting more and hard for patient to swollow  How long has patient had these symptoms:  Worsening since surgery  Pharmacy name and phone #:  n/a  Best Call Back Number: [phone]  Additional Information:

## 2018-05-02 NOTE — TELEPHONE ENCOUNTER
"States throat is sore, a little difficulty swallowing.  Reports the she was admitted overnight on 4/28 to [facility] for elevated BP, SOB, and anxiety, all tests were negative, she was released and told to follow up with cardiologist Dr Tanner, seen Dr Tanner on Monday, he adjusted her BP medication, "but my now my blood pressure feels like it is too low" and I'm just still having a little difficulty swallowing".  Also states incision "looks good, it is healing well".  Informed the that she should call Dr Tanner about BP concern, reassured her that sometimes post-intubation can cause throat soreness, I will discuss with Dr Velarde and call her back if he has any recommendations.  Voices understanding, confirmed post op appt on 5/10, states she will call Dr Tanner about blood pressure concerns.   "

## 2018-05-10 ENCOUNTER — OFFICE VISIT (OUTPATIENT)
Dept: VASCULAR SURGERY | Facility: CLINIC | Age: 78
End: 2018-05-10
Payer: MEDICARE

## 2018-05-10 VITALS
BODY MASS INDEX: 26.03 KG/M2 | HEART RATE: 73 BPM | SYSTOLIC BLOOD PRESSURE: 118 MMHG | WEIGHT: 162 LBS | DIASTOLIC BLOOD PRESSURE: 70 MMHG | HEIGHT: 66 IN

## 2018-05-10 DIAGNOSIS — I65.21 STENOSIS OF RIGHT CAROTID ARTERY: Primary | ICD-10-CM

## 2018-05-10 PROCEDURE — 99999 PR PBB SHADOW E&M-EST. PATIENT-LVL III: CPT | Mod: PBBFAC,,, | Performed by: THORACIC SURGERY (CARDIOTHORACIC VASCULAR SURGERY)

## 2018-05-10 PROCEDURE — 99024 POSTOP FOLLOW-UP VISIT: CPT | Mod: POP,,, | Performed by: THORACIC SURGERY (CARDIOTHORACIC VASCULAR SURGERY)

## 2018-05-10 PROCEDURE — 99213 OFFICE O/P EST LOW 20 MIN: CPT | Mod: PBBFAC,PO | Performed by: THORACIC SURGERY (CARDIOTHORACIC VASCULAR SURGERY)

## 2018-05-10 NOTE — PROGRESS NOTES
OFFICE VISIT NOTE    Ms. Mascorro underwent right carotid endarterectomy.  She is doing well.  She   states that she had some episodes of hypertension and then a low normal blood   pressure, but that is leveling out now.  Neurologically, she is intact and the   incision is healing well.  She will get a carotid ultrasound in one year.      GILBERTO  dd: 05/10/2018 17:05:46 (CDT)  td: 05/11/2018 08:21:45 (CDT)  Doc ID   #[id number]  Job ID #[id number]    CC:

## 2018-06-13 PROBLEM — I25.10 CAD S/P PERCUTANEOUS CORONARY ANGIOPLASTY: Status: ACTIVE | Noted: 2018-06-13

## 2018-06-13 PROBLEM — Z98.61 CAD S/P PERCUTANEOUS CORONARY ANGIOPLASTY: Status: ACTIVE | Noted: 2018-06-13

## 2018-06-25 ENCOUNTER — LAB VISIT (OUTPATIENT)
Dept: LAB | Facility: HOSPITAL | Age: 78
End: 2018-06-25
Attending: INTERNAL MEDICINE
Payer: MEDICARE

## 2018-06-25 DIAGNOSIS — E03.9 HYPOTHYROIDISM, UNSPECIFIED TYPE: ICD-10-CM

## 2018-06-25 LAB
ALBUMIN SERPL BCP-MCNC: 3.3 G/DL
ALP SERPL-CCNC: 109 U/L
ALT SERPL W/O P-5'-P-CCNC: 17 U/L
ANION GAP SERPL CALC-SCNC: 8 MMOL/L
AST SERPL-CCNC: 20 U/L
BILIRUB SERPL-MCNC: 0.6 MG/DL
BUN SERPL-MCNC: 14 MG/DL
CALCIUM SERPL-MCNC: 9.7 MG/DL
CHLORIDE SERPL-SCNC: 105 MMOL/L
CO2 SERPL-SCNC: 23 MMOL/L
CREAT SERPL-MCNC: 0.7 MG/DL
EST. GFR  (AFRICAN AMERICAN): >60 ML/MIN/1.73 M^2
EST. GFR  (NON AFRICAN AMERICAN): >60 ML/MIN/1.73 M^2
GLUCOSE SERPL-MCNC: 80 MG/DL
POTASSIUM SERPL-SCNC: 4.2 MMOL/L
PROT SERPL-MCNC: 6.2 G/DL
SODIUM SERPL-SCNC: 136 MMOL/L
TSH SERPL DL<=0.005 MIU/L-ACNC: 3.25 UIU/ML

## 2018-06-25 PROCEDURE — 80053 COMPREHEN METABOLIC PANEL: CPT

## 2018-06-25 PROCEDURE — 84443 ASSAY THYROID STIM HORMONE: CPT

## 2018-06-25 PROCEDURE — 36415 COLL VENOUS BLD VENIPUNCTURE: CPT | Mod: PO

## 2018-06-29 ENCOUNTER — OFFICE VISIT (OUTPATIENT)
Dept: ENDOCRINOLOGY | Facility: CLINIC | Age: 78
End: 2018-06-29
Payer: MEDICARE

## 2018-06-29 VITALS
DIASTOLIC BLOOD PRESSURE: 60 MMHG | HEIGHT: 65 IN | HEART RATE: 73 BPM | WEIGHT: 161.69 LBS | SYSTOLIC BLOOD PRESSURE: 128 MMHG | BODY MASS INDEX: 26.94 KG/M2

## 2018-06-29 DIAGNOSIS — E87.1 HYPONATREMIA: ICD-10-CM

## 2018-06-29 DIAGNOSIS — R53.83 FATIGUE, UNSPECIFIED TYPE: ICD-10-CM

## 2018-06-29 DIAGNOSIS — E03.9 HYPOTHYROIDISM, UNSPECIFIED TYPE: Primary | ICD-10-CM

## 2018-06-29 PROCEDURE — 99214 OFFICE O/P EST MOD 30 MIN: CPT | Mod: PBBFAC,PO | Performed by: INTERNAL MEDICINE

## 2018-06-29 PROCEDURE — 99214 OFFICE O/P EST MOD 30 MIN: CPT | Mod: S$PBB,,, | Performed by: INTERNAL MEDICINE

## 2018-06-29 PROCEDURE — 99999 PR PBB SHADOW E&M-EST. PATIENT-LVL IV: CPT | Mod: PBBFAC,,, | Performed by: INTERNAL MEDICINE

## 2018-06-29 NOTE — PROGRESS NOTES
CHIEF COMPLAINT: Hypothyroid  77 year old being seen as a f/u Hypothyroid since late 40's. On synthroid 50 mcg. No palpitations. No tremors. No diarrhea or constipation.            PAST MEDICAL HISTORY/PAST SURGICAL HISTORY:  Reviewed in [facility]    SOCIAL HISTORY: No T/A    FAMILY HISTORY:  Daughter has hyperthyroidism. No DM    MEDICATIONS/ALLERGIES: The patient's MedCard has been updated and reviewed.      ROS:   Constitutional: No recent significant weight change  Eyes: No recent visual changes  ENT: No dysphagia  Cardiovascular: Denies current anginal symptoms  Respiratory: Denies current respiratory difficulty  Gastrointestinal: Denies recent bowel disturbances  GenitoUrinary - No dysuria  Skin: No new skin rash  Neurologic: No focal neurologic complaints  Remainder ROS negative        PE:    GENERAL: Well developed, well nourished.  NECK: Supple, trachea midline, No palpable thyroid nodules  CHEST: Resp even and unlabored, CTA bilateral.  CARDIAC: RRR, S1, S2 heard, no murmurs, rubs, S3, or S4    Results for TIFFANY TRINA R (MRN [id number]) as of 6/29/2018 11:50   Ref. Range 6/25/2018 09:00   Sodium Latest Ref Range: 136 - 145 mmol/L 136   Potassium Latest Ref Range: 3.5 - 5.1 mmol/L 4.2   Chloride Latest Ref Range: 95 - 110 mmol/L 105   CO2 Latest Ref Range: 23 - 29 mmol/L 23   Anion Gap Latest Ref Range: 8 - 16 mmol/L 8   BUN, Bld Latest Ref Range: 8 - 23 mg/dL 14   Creatinine Latest Ref Range: 0.5 - 1.4 mg/dL 0.7   eGFR if non African American Latest Ref Range: >60 mL/min/1.73 m^2 >60.0   eGFR if African American Latest Ref Range: >60 mL/min/1.73 m^2 >60.0   Glucose Latest Ref Range: 70 - 110 mg/dL 80   Calcium Latest Ref Range: 8.7 - 10.5 mg/dL 9.7   Alkaline Phosphatase Latest Ref Range: 55 - 135 U/L 109   Total Protein Latest Ref Range: 6.0 - 8.4 g/dL 6.2   Albumin Latest Ref Range: 3.5 - 5.2 g/dL 3.3 (L)   Total Bilirubin Latest Ref Range: 0.1 - 1.0 mg/dL 0.6   AST Latest Ref Range: 10 - 40 U/L 20   ALT  Latest Ref Range: 10 - 44 U/L 17   TSH Latest Ref Range: 0.400 - 4.000 uIU/mL 3.245         ASSESSMENT/PLAN:  1. Hypothyroid- TSH WNL. Will continue current Tx for now. She has some fatigue has never improved even when TSH lower. Will recheck in a few months     2. Hyponatremia- Na stable. Continue fluid restriction    3. Fatigue- Discussed has multiple conditions that could contribute to fatigue: Beta blocker, polypharmacy, Anemia, CAD, diastolic dysfunction.     FOLLOWUP  TSH- 4 months  F/U 1 year- CMP, TSH

## 2018-07-09 ENCOUNTER — TELEPHONE (OUTPATIENT)
Dept: ENDOCRINOLOGY | Facility: CLINIC | Age: 78
End: 2018-07-09

## 2018-07-09 NOTE — TELEPHONE ENCOUNTER
----- Message from Maryann Brennan sent at 7/9/2018  4:02 PM CDT -----  Contact: patient   Patient is requesting a copy of her test results to be picked up. Please call [phone] when ready.

## 2018-10-29 ENCOUNTER — LAB VISIT (OUTPATIENT)
Dept: LAB | Facility: HOSPITAL | Age: 78
End: 2018-10-29
Attending: INTERNAL MEDICINE
Payer: MEDICARE

## 2018-10-29 DIAGNOSIS — E03.9 HYPOTHYROIDISM, UNSPECIFIED TYPE: ICD-10-CM

## 2018-10-29 DIAGNOSIS — E87.1 HYPONATREMIA: ICD-10-CM

## 2018-10-29 LAB
T4 FREE SERPL-MCNC: 0.83 NG/DL
TSH SERPL DL<=0.005 MIU/L-ACNC: 4.24 UIU/ML

## 2018-10-29 PROCEDURE — 84443 ASSAY THYROID STIM HORMONE: CPT

## 2018-10-29 PROCEDURE — 36415 COLL VENOUS BLD VENIPUNCTURE: CPT | Mod: PO

## 2018-10-29 PROCEDURE — 84439 ASSAY OF FREE THYROXINE: CPT

## 2019-07-09 ENCOUNTER — TELEPHONE (OUTPATIENT)
Dept: ENDOCRINOLOGY | Facility: CLINIC | Age: 79
End: 2019-07-09

## 2019-07-09 NOTE — TELEPHONE ENCOUNTER
----- Message from Li Mcarthur sent at 7/9/2019 12:01 PM CDT -----  Contact: self [phone]  She is calling to schedule the follow up appt that was to happen in June.  First available is in January.  Can you fit her in sooner?  Also she needs a new order for the TSH.  Thank you!

## 2019-07-29 ENCOUNTER — TELEPHONE (OUTPATIENT)
Dept: ENDOCRINOLOGY | Facility: CLINIC | Age: 79
End: 2019-07-29

## 2019-07-29 NOTE — TELEPHONE ENCOUNTER
Spoke to Dr Trammell's office and scheduled pt f/u per Evangelista. Adv date and time, she was going to notify pt.

## 2019-07-29 NOTE — TELEPHONE ENCOUNTER
----- Message from Robyn Cox sent at 7/29/2019  2:45 PM CDT -----  Contact: Lois  Type: Needs Medical Advice    Who Called:  Lois/  Best Call Back Number: [phone] call this num not the patient  ASAP  Additional Information: Lois from Dr. Trammell office would like a call back to schedule and earlier appt for the patient she has a  Low Cortisol lever of 1.9 and needs to be seen asap

## 2019-08-21 ENCOUNTER — OFFICE VISIT (OUTPATIENT)
Dept: ENDOCRINOLOGY | Facility: CLINIC | Age: 79
End: 2019-08-21
Payer: MEDICARE

## 2019-08-21 VITALS
HEIGHT: 66 IN | BODY MASS INDEX: 27.14 KG/M2 | SYSTOLIC BLOOD PRESSURE: 122 MMHG | HEART RATE: 70 BPM | WEIGHT: 168.88 LBS | DIASTOLIC BLOOD PRESSURE: 60 MMHG

## 2019-08-21 DIAGNOSIS — E03.9 HYPOTHYROIDISM, UNSPECIFIED TYPE: Primary | ICD-10-CM

## 2019-08-21 DIAGNOSIS — R68.89 ABNORMAL ENDOCRINE LABORATORY TEST FINDING: ICD-10-CM

## 2019-08-21 DIAGNOSIS — R53.83 FATIGUE, UNSPECIFIED TYPE: ICD-10-CM

## 2019-08-21 DIAGNOSIS — E87.1 HYPONATREMIA: ICD-10-CM

## 2019-08-21 PROCEDURE — 99999 PR PBB SHADOW E&M-EST. PATIENT-LVL III: ICD-10-PCS | Mod: PBBFAC,,, | Performed by: INTERNAL MEDICINE

## 2019-08-21 PROCEDURE — 99999 PR PBB SHADOW E&M-EST. PATIENT-LVL III: CPT | Mod: PBBFAC,,, | Performed by: INTERNAL MEDICINE

## 2019-08-21 PROCEDURE — 99214 PR OFFICE/OUTPT VISIT, EST, LEVL IV, 30-39 MIN: ICD-10-PCS | Mod: S$PBB,,, | Performed by: INTERNAL MEDICINE

## 2019-08-21 PROCEDURE — 99213 OFFICE O/P EST LOW 20 MIN: CPT | Mod: PBBFAC,PO | Performed by: INTERNAL MEDICINE

## 2019-08-21 PROCEDURE — 99214 OFFICE O/P EST MOD 30 MIN: CPT | Mod: S$PBB,,, | Performed by: INTERNAL MEDICINE

## 2019-08-21 RX ORDER — IPRATROPIUM BROMIDE 42 UG/1
2 SPRAY, METERED NASAL 2 TIMES DAILY
Refills: 1 | COMMUNITY
Start: 2019-07-10

## 2019-08-21 RX ORDER — HYDROCODONE BITARTRATE AND ACETAMINOPHEN 10; 325 MG/1; MG/1
1 TABLET ORAL EVERY 8 HOURS PRN
Refills: 0 | COMMUNITY
Start: 2019-07-30

## 2019-08-21 RX ORDER — CYCLOSPORINE 0.5 MG/ML
1 EMULSION OPHTHALMIC 2 TIMES DAILY
Refills: 3 | COMMUNITY
Start: 2019-07-29

## 2019-08-21 RX ORDER — DICLOFENAC SODIUM 10 MG/G
2 GEL TOPICAL 4 TIMES DAILY
COMMUNITY
Start: 2019-07-10

## 2019-08-21 RX ORDER — EPINEPHRINE 0.3 MG/.3ML
1 INJECTION SUBCUTANEOUS
Refills: 0 | COMMUNITY
Start: 2019-06-11

## 2019-08-21 NOTE — PROGRESS NOTES
CHIEF COMPLAINT: Hypothyroid/Low Cortisol  78 year old being seen as a f/u Hypothyroid since late 40's. On synthroid 75 mcg. States that recently had cortisol and found to be low. No Biotin. No thyroid or adrenal supplements. She does have some fatigue. No N/V. Had a knee injection 2 weeks ago. No Head trauma. Not lightheaded when standing. States BP recently elevated.            PAST MEDICAL HISTORY/PAST SURGICAL HISTORY:  Reviewed in [facility]    SOCIAL HISTORY: No T/A    FAMILY HISTORY:  Daughter has hyperthyroidism. No DM    MEDICATIONS/ALLERGIES: The patient's MedCard has been updated and reviewed.      ROS:   Constitutional: weight increased  Eyes: No recent visual changes  ENT: No dysphagia  Cardiovascular: Denies current anginal symptoms  Respiratory: Denies current respiratory difficulty  Gastrointestinal: Denies recent bowel disturbances  GenitoUrinary - No dysuria  Skin: No new skin rash  Neurologic: No focal neurologic complaints  Remainder ROS negative        PE:    GENERAL: Well developed, well nourished.  NECK: Supple, trachea midline, No palpable thyroid nodules  CHEST: Resp even and unlabored, CTA bilateral.  CARDIAC: RRR, S1, S2 heard, no murmurs, rubs, S3, or S4    Results for TRINA ALLEN (MRN [id number]) as of 8/21/2019 11:11   Ref. Range 7/10/2019 13:13   Sodium Latest Ref Range: 136 - 145 mmol/L 133 (L)   Potassium Latest Ref Range: 3.5 - 5.1 mmol/L 4.5   Chloride Latest Ref Range: 95 - 110 mmol/L 100   CO2 Latest Ref Range: 22 - 31 mmol/L 28   Anion Gap Latest Ref Range: 8 - 16 mmol/L 5 (L)   BUN, Bld Latest Ref Range: 7 - 18 mg/dL 16   Creatinine Latest Ref Range: 0.50 - 1.40 mg/dL 0.52   eGFR if non African American Latest Ref Range: >60 mL/min/1.73 m^2 >60   eGFR if  Latest Ref Range: >60 mL/min/1.73 m^2 >60   Glucose Latest Ref Range: 70 - 110 mg/dL 84   Calcium Latest Ref Range: 8.4 - 10.2 mg/dL 9.0   Alkaline Phosphatase Latest Ref Range: 38 - 145 U/L 94   PROTEIN TOTAL  Latest Ref Range: 6.0 - 8.4 g/dL 6.0   Albumin Latest Ref Range: 3.5 - 5.2 g/dL 3.6   BILIRUBIN TOTAL Latest Ref Range: 0.2 - 1.3 mg/dL 0.7   AST Latest Ref Range: 14 - 36 U/L 33   ALT Latest Ref Range: 10 - 44 U/L 41         ASSESSMENT/PLAN:  1. Low Cortisol- recently received steroid injection. However labs may have been prior to injection. Other than fatigue, no other symptoms of AI. Weight is actually increasing. Will do cosyntropin stim test.     2. Hyponatremia- Check CMP    3. Hypothyroid- Check TSH    FOLLOWUP  4 weeks- Cosyntropin Stim Test with DHEAS, TSH, CMP at baseline.

## 2019-09-19 ENCOUNTER — CLINICAL SUPPORT (OUTPATIENT)
Dept: ENDOCRINOLOGY | Facility: CLINIC | Age: 79
End: 2019-09-19
Payer: MEDICARE

## 2019-09-19 DIAGNOSIS — R79.89 ABNORMAL CORTISOL LEVEL: Primary | ICD-10-CM

## 2019-09-19 DIAGNOSIS — R53.83 FATIGUE, UNSPECIFIED TYPE: ICD-10-CM

## 2019-09-19 LAB
ALBUMIN SERPL BCP-MCNC: 3.5 G/DL (ref 3.5–5.2)
ALP SERPL-CCNC: 94 U/L (ref 55–135)
ALT SERPL W/O P-5'-P-CCNC: 27 U/L (ref 10–44)
ANION GAP SERPL CALC-SCNC: 12 MMOL/L (ref 8–16)
AST SERPL-CCNC: 23 U/L (ref 10–40)
BILIRUB SERPL-MCNC: 0.5 MG/DL (ref 0.1–1)
BUN SERPL-MCNC: 21 MG/DL (ref 8–23)
CALCIUM SERPL-MCNC: 8.9 MG/DL (ref 8.7–10.5)
CHLORIDE SERPL-SCNC: 94 MMOL/L (ref 95–110)
CO2 SERPL-SCNC: 22 MMOL/L (ref 23–29)
CORTIS SERPL-MCNC: 2 UG/DL
CORTIS SERPL-MCNC: 7.7 UG/DL
CORTIS SERPL-MCNC: 9.7 UG/DL
CREAT SERPL-MCNC: 0.8 MG/DL (ref 0.5–1.4)
DHEA-S SERPL-MCNC: 8.9 UG/DL
EST. GFR  (AFRICAN AMERICAN): >60 ML/MIN/1.73 M^2
EST. GFR  (NON AFRICAN AMERICAN): >60 ML/MIN/1.73 M^2
GLUCOSE SERPL-MCNC: 68 MG/DL (ref 70–110)
POTASSIUM SERPL-SCNC: 4.4 MMOL/L (ref 3.5–5.1)
PROT SERPL-MCNC: 6.3 G/DL (ref 6–8.4)
SODIUM SERPL-SCNC: 128 MMOL/L (ref 136–145)
TSH SERPL DL<=0.005 MIU/L-ACNC: 1.13 UIU/ML (ref 0.4–4)

## 2019-09-19 PROCEDURE — 84443 ASSAY THYROID STIM HORMONE: CPT

## 2019-09-19 PROCEDURE — 80053 COMPREHEN METABOLIC PANEL: CPT

## 2019-09-19 PROCEDURE — 82627 DEHYDROEPIANDROSTERONE: CPT

## 2019-09-19 PROCEDURE — 82024 ASSAY OF ACTH: CPT

## 2019-09-19 PROCEDURE — 82533 TOTAL CORTISOL: CPT | Mod: 91

## 2019-09-19 RX ORDER — COSYNTROPIN 0.25 MG/ML
0.25 INJECTION, POWDER, FOR SOLUTION INTRAMUSCULAR; INTRAVENOUS ONCE
Status: COMPLETED | OUTPATIENT
Start: 2019-09-19 | End: 2019-09-19

## 2019-09-19 RX ADMIN — COSYNTROPIN 0.25 MG: 0.25 INJECTION, POWDER, LYOPHILIZED, FOR SOLUTION INTRAMUSCULAR; INTRAVENOUS at 08:09

## 2019-09-19 NOTE — PROGRESS NOTES
Pt arrived in room at 8:05am and procedure explained. Pt verbalized understanding. IV Intima 22G started in Right AC with baseline labs drawn at 8:16am. Cortrosyn IVP over 2 min at 8:20am, pt tolerated well.  Labs drawn again at 8:50am and then again 9:20amper protocol.  At 9:24am IV d/c with tip intact. Ambulated out of clinic with belongings. Pt verbalized understanding that Dr. Naidu's office will call to notify of procedure results when available.

## 2019-09-20 ENCOUNTER — TELEPHONE (OUTPATIENT)
Dept: ENDOCRINOLOGY | Facility: CLINIC | Age: 79
End: 2019-09-20

## 2019-09-20 DIAGNOSIS — E27.49 SECONDARY ADRENAL INSUFFICIENCY: Primary | ICD-10-CM

## 2019-09-20 LAB — ACTH PLAS-MCNC: <5 PG/ML (ref 0–46)

## 2019-09-20 RX ORDER — HYDROCORTISONE 5 MG/1
TABLET ORAL
Qty: 90 TABLET | Refills: 6 | Status: SHIPPED | OUTPATIENT
Start: 2019-09-20 | End: 2020-04-27

## 2019-09-20 NOTE — TELEPHONE ENCOUNTER
Let her know that her test does show adrenal insufficiency  Start hydrocortisone 10 mg in AM and 5 mg in early afternoon  Will need MRI Brain. Verify no metal implants  Have her do CMP,TSH, Ft4, Prolactin, GH, TSH, FT4, LH, FSH to check rest of pituitary hormones- can do them 2 weeks after starting steroids  F/U with me after labs and MRi. Ok to double book or use next available. urgent

## 2019-09-20 NOTE — TELEPHONE ENCOUNTER
Pt verbalized understanding  All labs scheduled for 10/7/19  Will f/u 10/16/19 at 3:30pm    Does have metal hip implants - No MRI orders in appt desk as of yet    Please advise

## 2019-09-25 ENCOUNTER — TELEPHONE (OUTPATIENT)
Dept: ENDOCRINOLOGY | Facility: CLINIC | Age: 79
End: 2019-09-25

## 2019-09-25 NOTE — TELEPHONE ENCOUNTER
Spoke w/ pt, states she's having headaches daily, very tired, no energy since starting the Cortef about 5 days ago.  Advised her to call the pharmacist regarding her symptoms as  is out of the office until Monday.  I will forward the message to him for advice upon his return, but maybe the pharmacist can offer advice in the meantime.  Pt verbalized understanding.

## 2019-09-25 NOTE — TELEPHONE ENCOUNTER
----- Message from Michaela Young sent at 9/25/2019  1:52 PM CDT -----  Contact: pt  Pt would like a call back having a reaction from medication     HYDROcodone-acetaminophen (NORCO)      Can be reached at [phone]

## 2019-09-26 ENCOUNTER — TELEPHONE (OUTPATIENT)
Dept: ENDOCRINOLOGY | Facility: CLINIC | Age: 79
End: 2019-09-26

## 2019-09-26 DIAGNOSIS — E34.8 OTHER SPECIFIED ENDOCRINE DISORDERS: ICD-10-CM

## 2019-09-26 NOTE — TELEPHONE ENCOUNTER
Have her take the hydrocortisone dose earlier in the afternoon- approx Noon-1 PM.   Sometimes if the afternoon is taken late, it can cause poor sleep and then fatigue

## 2019-09-26 NOTE — TELEPHONE ENCOUNTER
Can you check with radiology if she can have a brain MRI with those implants. If so, I can put the order in

## 2019-09-26 NOTE — TELEPHONE ENCOUNTER
----- Message from Johanna Quintero sent at 9/26/2019  3:22 PM CDT -----  Type:  Patient Returning Call    Who Called:  Patient  Who Left Message for Patient:  Jesica  Does the patient know what this is regarding?:  Talking to Dr. Evangelista Rodríguez Call Back Number:  [phone]

## 2019-09-26 NOTE — TELEPHONE ENCOUNTER
Per Radiology, metal hip implants are ok for MRI  OK to order MRI   Let me know when ordered and I will schedule

## 2019-09-26 NOTE — TELEPHONE ENCOUNTER
Pt verbalized understanding. Will take second dose of steroid around noon  As far as headaches and B/p 114/68 (has been consistent even with steroid supplementation).     Did you get message about MRI ok to do with metal hip implant?  Please place order

## 2019-09-27 NOTE — TELEPHONE ENCOUNTER
"Pt scheduled for MRI and labs Oct 17th at 10:30am and them f/u with Dr. Naidu on Oct 21st at 2:30pm  "ok thank you"  "

## 2019-10-17 ENCOUNTER — HOSPITAL ENCOUNTER (OUTPATIENT)
Dept: RADIOLOGY | Facility: HOSPITAL | Age: 79
Discharge: HOME | End: 2019-10-17
Attending: INTERNAL MEDICINE
Payer: MEDICARE

## 2019-10-17 DIAGNOSIS — E34.8 OTHER SPECIFIED ENDOCRINE DISORDERS: ICD-10-CM

## 2019-10-17 PROCEDURE — 70553 MRI BRAIN W WO CONTRAST: ICD-10-PCS | Mod: 26,,, | Performed by: RADIOLOGY

## 2019-10-17 PROCEDURE — 25500020 PHARM REV CODE 255: Mod: PO | Performed by: INTERNAL MEDICINE

## 2019-10-17 PROCEDURE — A9585 GADOBUTROL INJECTION: HCPCS | Mod: PO | Performed by: INTERNAL MEDICINE

## 2019-10-17 PROCEDURE — 70553 MRI BRAIN STEM W/O & W/DYE: CPT | Mod: TC,PO

## 2019-10-17 PROCEDURE — 70553 MRI BRAIN STEM W/O & W/DYE: CPT | Mod: 26,,, | Performed by: RADIOLOGY

## 2019-10-17 RX ORDER — GADOBUTROL 604.72 MG/ML
7 INJECTION INTRAVENOUS
Status: COMPLETED | OUTPATIENT
Start: 2019-10-17 | End: 2019-10-17

## 2019-10-17 RX ADMIN — GADOBUTROL 7 ML: 604.72 INJECTION INTRAVENOUS at 10:10

## 2019-10-21 ENCOUNTER — OFFICE VISIT (OUTPATIENT)
Dept: ENDOCRINOLOGY | Facility: CLINIC | Age: 79
End: 2019-10-21
Payer: MEDICARE

## 2019-10-21 VITALS
WEIGHT: 178 LBS | BODY MASS INDEX: 28.61 KG/M2 | HEART RATE: 87 BPM | HEIGHT: 66 IN | SYSTOLIC BLOOD PRESSURE: 140 MMHG | DIASTOLIC BLOOD PRESSURE: 64 MMHG

## 2019-10-21 DIAGNOSIS — E22.1 HYPERPROLACTINEMIA: ICD-10-CM

## 2019-10-21 DIAGNOSIS — E03.9 HYPOTHYROIDISM, UNSPECIFIED TYPE: ICD-10-CM

## 2019-10-21 DIAGNOSIS — E87.1 HYPONATREMIA: ICD-10-CM

## 2019-10-21 DIAGNOSIS — E27.49 SECONDARY ADRENAL INSUFFICIENCY: Primary | ICD-10-CM

## 2019-10-21 PROCEDURE — 99999 PR PBB SHADOW E&M-EST. PATIENT-LVL III: CPT | Mod: PBBFAC,,, | Performed by: INTERNAL MEDICINE

## 2019-10-21 PROCEDURE — 99999 PR PBB SHADOW E&M-EST. PATIENT-LVL III: ICD-10-PCS | Mod: PBBFAC,,, | Performed by: INTERNAL MEDICINE

## 2019-10-21 PROCEDURE — 99213 OFFICE O/P EST LOW 20 MIN: CPT | Mod: PBBFAC,PO | Performed by: INTERNAL MEDICINE

## 2019-10-21 PROCEDURE — 99214 PR OFFICE/OUTPT VISIT, EST, LEVL IV, 30-39 MIN: ICD-10-PCS | Mod: S$PBB,,, | Performed by: INTERNAL MEDICINE

## 2019-10-21 PROCEDURE — 99214 OFFICE O/P EST MOD 30 MIN: CPT | Mod: S$PBB,,, | Performed by: INTERNAL MEDICINE

## 2019-10-21 RX ORDER — AZILSARTAN KAMEDOXOMIL AND CHLORTHALIDONE 40; 12.5 MG/1; MG/1
1 TABLET ORAL DAILY
Refills: 3 | Status: ON HOLD | COMMUNITY
Start: 2019-10-02 | End: 2020-06-13 | Stop reason: HOSPADM

## 2019-10-21 NOTE — PROGRESS NOTES
CHIEF COMPLAINT: Hypothyroid/Low Cortisol  78 year old being seen as a f/u Hypothyroid since late 40's. On synthroid 75 mcg. Started on hydrocortisone 10/5. IGF-1 pending. No Galactorrhea. Has been having HA after starting steroids.            PAST MEDICAL HISTORY/PAST SURGICAL HISTORY:  Reviewed in [facility]    SOCIAL HISTORY: No T/A    FAMILY HISTORY:  Daughter has hyperthyroidism. No DM    MEDICATIONS/ALLERGIES: The patient's MedCard has been updated and reviewed.      ROS:   Constitutional: weight increased  Eyes: No recent visual changes  ENT: No dysphagia  Cardiovascular: Denies current anginal symptoms  Respiratory: Denies current respiratory difficulty  Gastrointestinal: Denies recent bowel disturbances  GenitoUrinary - No dysuria  Skin: No new skin rash  Neurologic: No focal neurologic complaints  Remainder ROS negative        PE:    GENERAL: Well developed, well nourished.  NECK: Supple, trachea midline, No palpable thyroid nodules  CHEST: Resp even and unlabored, CTA bilateral.  CARDIAC: RRR, S1, S2 heard, no murmurs, rubs, S3, or S4    Results for TRINA ALLEN (MRN [id number]) as of 10/21/2019 14:58   Ref. Range 9/19/2019 08:16 9/19/2019 08:50 9/19/2019 09:20 10/17/2019 09:13   Cortisol Latest Units: ug/dL 2.00 7.70 9.70    ACTH Latest Ref Range: 0 - 46 pg/mL <5      TSH Latest Ref Range: 0.400 - 4.000 uIU/mL 1.133   1.133   Free T4 Latest Ref Range: 0.71 - 1.51 ng/dL    1.04     Results for TRINA ALLEN (MRN [id number]) as of 10/21/2019 14:58   Ref. Range 10/17/2019 09:13   Sodium Latest Ref Range: 136 - 145 mmol/L 130 (L)   Potassium Latest Ref Range: 3.5 - 5.1 mmol/L 4.5   Chloride Latest Ref Range: 95 - 110 mmol/L 94 (L)   CO2 Latest Ref Range: 23 - 29 mmol/L 28   Anion Gap Latest Ref Range: 8 - 16 mmol/L 8   BUN, Bld Latest Ref Range: 8 - 23 mg/dL 16   Creatinine Latest Ref Range: 0.5 - 1.4 mg/dL 0.7   eGFR if non African American Latest Ref Range: >60 mL/min/1.73 m^2 >60.0   eGFR if African  American Latest Ref Range: >60 mL/min/1.73 m^2 >60.0   Glucose Latest Ref Range: 70 - 110 mg/dL 78   Calcium Latest Ref Range: 8.7 - 10.5 mg/dL 9.6   Alkaline Phosphatase Latest Ref Range: 55 - 135 U/L 76   PROTEIN TOTAL Latest Ref Range: 6.0 - 8.4 g/dL 6.4   Albumin Latest Ref Range: 3.5 - 5.2 g/dL 3.7   BILIRUBIN TOTAL Latest Ref Range: 0.1 - 1.0 mg/dL 0.7   AST Latest Ref Range: 10 - 40 U/L 27   ALT Latest Ref Range: 10 - 44 U/L 35     Results for TRINA ALLEN (MRN [id number]) as of 10/21/2019 14:58   Ref. Range 10/17/2019 09:13   FSH Latest Ref Range: See Text mIU/mL 40.00   LH Latest Ref Range: See Text mIU/mL 14.8   Prolactin Latest Ref Range: 5.2 - 26.5 ng/mL 58.9 (H)     MRI BRAIN W WO CONTRAST    CLINICAL HISTORY:  Other endocrine disorders;has metal hip implant;.  Other specified endocrine disorders    TECHNIQUE:  Multiplanar multisequence MR imaging of the brain was performed before and after the administration of 7 mL Gadavist  intravenous contrast using the pituitary protocol.    This included acquisitions showing dynamic contrast enhancement of the sella turcica in the coronal plane and a post-contrast T1-weighted sequence.    COMPARISON:  None.    FINDINGS:  Intracranial compartment:    There is a subcentimeter T2 hyperintense lesion in the posterior left aspect of the pituitary gland, measuring approximately 2 x 2 x 2 mm (series 7, image 6) (series 9, image 44), which demonstrates no evidence of postcontrast enhancement.  Pituitary infundibulum is slightly deviated to the left.  The optic chiasm and orbits are normal.  There are no areas of abnormal contrast enhancement.    Incidental note is made of an extra-axial cystic lesion overlying the left parasagittal posterior frontoparietal region with slight extension across the falx, measuring approximately 4.2 x 3.3 x 2.3 cm in AP by TV by CC dimensions.  This lesion follows CSF intensity on all sequences and demonstrates mild mass effect on the  underlying parenchyma.  No associated diffusion restriction.  No midline shift.  This lesion is most compatible with an arachnoid cyst.  There are 2 additional extra-axial cystic masses overlying bilateral parietooccipital convexities with mild underlying mass effect (series 3, image 18), measuring approximately 1.5 x 2.4 x 2.3 cm and 1.4 x 2.1 x 2.0 cm, also most likely representing small arachnoid cysts.    The scalp and calvarium are normal.  The superior sagittal sinus demonstrates normal venous flow.  The corpus callosum is normal in shape and signal intensity.  The posterior fossa is unremarkable.  The brainstem and craniocervical junction are unremarkable.    There is diffusely increased prominence of the cerebral sulci with mild compensatory enlargement of the ventricles in keeping with generalized cerebral volume loss.    The paranasal sinuses are normal.  The visualized portions of the mastoids are unremarkable.  The orbits appear normal.  Bilateral lens replacements noted.  Normal flow voids are demonstrated in the carotid arteries and basilar artery.      Impression       1. 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  2. Otherwise, no acute intracranial abnormalities.  3. Incidental note is made of a few extra-axial cystic masses most consistent with arachnoid cysts, which overlie the left parasagittal frontoparietal and bilateral parietooccipital convexities.       ASSESSMENT/PLAN:  1. 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 Undetectable ACTH. Possible 2 mm lesion seen on MRI. However that may be incidental finding and less likely to be the cause of A!. Discussed sick day precautions and wearing medic alert bracelet. Scheduled for Surgery Wed.     Steroid dose for  Surgery  Hydrocortisone 50 mg IV on call to OR.   Then Hydrocortisone 25 mg PO or IV Q 8 hours x 24 hours  Then hydrocortisone 10 mg in AM and 5 mg in early afternoon  Call with any N/V, hypotension, lightheaded    2. Hyponatremia- Na may improve with Tx of AI.     3. Hypothyroid- TSH WNL>    4. Hyperprolactinemia- FSH and LH not suppressed. Will repeat in a few weeks. Unsure if related to 2 mm lesion seen on MRI.     FOLLOWUP  Fax copy of note to her anesthesiologist and surgeon. Has Surgery this Wed.  8 weeks- CMP, Prolactin  F/U 6 months with CMP, TSH

## 2019-10-22 ENCOUNTER — TELEPHONE (OUTPATIENT)
Dept: ENDOCRINOLOGY | Facility: CLINIC | Age: 79
End: 2019-10-22

## 2019-10-22 NOTE — TELEPHONE ENCOUNTER
Spoke w/ Avtar at Dr. Delarosa's office.  Explained I was faxing Dr. Naidu's chart note from yesterday with the steroid instructions for surgery tomorrow (10/23/19).  Note faxed to both of Dr. Delarosa's clinics at [phone] and [phone].

## 2019-11-05 ENCOUNTER — TELEPHONE (OUTPATIENT)
Dept: ENDOCRINOLOGY | Facility: CLINIC | Age: 79
End: 2019-11-05

## 2019-11-05 NOTE — TELEPHONE ENCOUNTER
----- Message from Princess JOHN Sanches sent at 11/5/2019  2:23 PM CST -----  Contact: Patient  Type: Needs Medical Advice    Who Called:  Patient  Best Call Back Number:   Additional Information: Requesting a call back in regards to scheduling an follow appt. Also patient is unclear if the Provider has changed her medicine, but also patient wants to know if getting the injections in the back would it affect any medication that is currently taking.

## 2019-11-05 NOTE — TELEPHONE ENCOUNTER
Pt is calling regarding the lab results.  States they weren't all back at the time of the visit.  Please advise.      Also, she sometimes needs steroid injections in her back, OK w/ the hydrocortisone she's taking?

## 2019-11-06 NOTE — TELEPHONE ENCOUNTER
Spoke w/ pt's , advised him of Dr. Naidu's message.  Have pt call the office if any questions (pt is still asleep this morning).  He verbalized understanding.

## 2019-12-16 ENCOUNTER — LAB VISIT (OUTPATIENT)
Dept: LAB | Facility: HOSPITAL | Age: 79
End: 2019-12-16
Attending: INTERNAL MEDICINE
Payer: MEDICARE

## 2019-12-16 DIAGNOSIS — E87.1 HYPONATREMIA: ICD-10-CM

## 2019-12-16 DIAGNOSIS — E03.9 HYPOTHYROIDISM, UNSPECIFIED TYPE: ICD-10-CM

## 2019-12-16 DIAGNOSIS — E27.49 SECONDARY ADRENAL INSUFFICIENCY: ICD-10-CM

## 2019-12-16 LAB
ALBUMIN SERPL BCP-MCNC: 3.5 G/DL (ref 3.5–5.2)
ALP SERPL-CCNC: 76 U/L (ref 55–135)
ALT SERPL W/O P-5'-P-CCNC: 23 U/L (ref 10–44)
ANION GAP SERPL CALC-SCNC: 7 MMOL/L (ref 8–16)
AST SERPL-CCNC: 18 U/L (ref 10–40)
BILIRUB SERPL-MCNC: 0.4 MG/DL (ref 0.1–1)
BUN SERPL-MCNC: 17 MG/DL (ref 8–23)
CALCIUM SERPL-MCNC: 8.8 MG/DL (ref 8.7–10.5)
CHLORIDE SERPL-SCNC: 97 MMOL/L (ref 95–110)
CO2 SERPL-SCNC: 26 MMOL/L (ref 23–29)
CREAT SERPL-MCNC: 0.7 MG/DL (ref 0.5–1.4)
EST. GFR  (AFRICAN AMERICAN): >60 ML/MIN/1.73 M^2
EST. GFR  (NON AFRICAN AMERICAN): >60 ML/MIN/1.73 M^2
GLUCOSE SERPL-MCNC: 90 MG/DL (ref 70–110)
POTASSIUM SERPL-SCNC: 4.1 MMOL/L (ref 3.5–5.1)
PROLACTIN SERPL IA-MCNC: 25.9 NG/ML (ref 5.2–26.5)
PROT SERPL-MCNC: 6.1 G/DL (ref 6–8.4)
SODIUM SERPL-SCNC: 130 MMOL/L (ref 136–145)

## 2019-12-16 PROCEDURE — 36415 COLL VENOUS BLD VENIPUNCTURE: CPT | Mod: PO

## 2019-12-16 PROCEDURE — 80053 COMPREHEN METABOLIC PANEL: CPT

## 2019-12-16 PROCEDURE — 84146 ASSAY OF PROLACTIN: CPT

## 2019-12-22 ENCOUNTER — TELEPHONE (OUTPATIENT)
Dept: ENDOCRINOLOGY | Facility: CLINIC | Age: 79
End: 2019-12-22

## 2020-04-27 RX ORDER — HYDROCORTISONE 5 MG/1
TABLET ORAL
Qty: 90 TABLET | Refills: 6 | Status: SHIPPED | OUTPATIENT
Start: 2020-04-27 | End: 2020-06-15 | Stop reason: SDUPTHER

## 2020-05-13 ENCOUNTER — LAB VISIT (OUTPATIENT)
Dept: LAB | Facility: HOSPITAL | Age: 80
End: 2020-05-13
Attending: INTERNAL MEDICINE
Payer: MEDICARE

## 2020-05-13 DIAGNOSIS — E27.49 SECONDARY ADRENAL INSUFFICIENCY: ICD-10-CM

## 2020-05-13 DIAGNOSIS — E03.9 HYPOTHYROIDISM, UNSPECIFIED TYPE: ICD-10-CM

## 2020-05-13 DIAGNOSIS — E87.1 HYPONATREMIA: ICD-10-CM

## 2020-05-13 LAB
ALBUMIN SERPL BCP-MCNC: 3.7 G/DL (ref 3.5–5.2)
ALP SERPL-CCNC: 77 U/L (ref 55–135)
ALT SERPL W/O P-5'-P-CCNC: 20 U/L (ref 10–44)
ANION GAP SERPL CALC-SCNC: 9 MMOL/L (ref 8–16)
AST SERPL-CCNC: 20 U/L (ref 10–40)
BILIRUB SERPL-MCNC: 0.6 MG/DL (ref 0.1–1)
BUN SERPL-MCNC: 16 MG/DL (ref 8–23)
CALCIUM SERPL-MCNC: 9.6 MG/DL (ref 8.7–10.5)
CHLORIDE SERPL-SCNC: 98 MMOL/L (ref 95–110)
CO2 SERPL-SCNC: 25 MMOL/L (ref 23–29)
CREAT SERPL-MCNC: 0.8 MG/DL (ref 0.5–1.4)
EST. GFR  (AFRICAN AMERICAN): >60 ML/MIN/1.73 M^2
EST. GFR  (NON AFRICAN AMERICAN): >60 ML/MIN/1.73 M^2
GLUCOSE SERPL-MCNC: 99 MG/DL (ref 70–110)
POTASSIUM SERPL-SCNC: 4.4 MMOL/L (ref 3.5–5.1)
PROT SERPL-MCNC: 6.5 G/DL (ref 6–8.4)
SODIUM SERPL-SCNC: 132 MMOL/L (ref 136–145)
TSH SERPL DL<=0.005 MIU/L-ACNC: 1.14 UIU/ML (ref 0.4–4)

## 2020-05-13 PROCEDURE — 36415 COLL VENOUS BLD VENIPUNCTURE: CPT | Mod: PO

## 2020-05-13 PROCEDURE — 80053 COMPREHEN METABOLIC PANEL: CPT

## 2020-05-13 PROCEDURE — 84443 ASSAY THYROID STIM HORMONE: CPT

## 2020-05-20 ENCOUNTER — OFFICE VISIT (OUTPATIENT)
Dept: ENDOCRINOLOGY | Facility: CLINIC | Age: 80
End: 2020-05-20
Payer: MEDICARE

## 2020-05-20 VITALS
BODY MASS INDEX: 30.37 KG/M2 | DIASTOLIC BLOOD PRESSURE: 64 MMHG | WEIGHT: 189 LBS | HEIGHT: 66 IN | SYSTOLIC BLOOD PRESSURE: 120 MMHG | HEART RATE: 72 BPM | OXYGEN SATURATION: 99 %

## 2020-05-20 DIAGNOSIS — D35.2 BENIGN NEOPLASM OF PITUITARY GLAND: ICD-10-CM

## 2020-05-20 DIAGNOSIS — E03.9 HYPOTHYROIDISM, UNSPECIFIED TYPE: ICD-10-CM

## 2020-05-20 DIAGNOSIS — E27.49 SECONDARY ADRENAL INSUFFICIENCY: Primary | ICD-10-CM

## 2020-05-20 PROCEDURE — 99214 PR OFFICE/OUTPT VISIT, EST, LEVL IV, 30-39 MIN: ICD-10-PCS | Mod: S$PBB,,, | Performed by: INTERNAL MEDICINE

## 2020-05-20 PROCEDURE — 99999 PR PBB SHADOW E&M-EST. PATIENT-LVL III: ICD-10-PCS | Mod: PBBFAC,,, | Performed by: INTERNAL MEDICINE

## 2020-05-20 PROCEDURE — 99213 OFFICE O/P EST LOW 20 MIN: CPT | Mod: PBBFAC,PO | Performed by: INTERNAL MEDICINE

## 2020-05-20 PROCEDURE — 99999 PR PBB SHADOW E&M-EST. PATIENT-LVL III: CPT | Mod: PBBFAC,,, | Performed by: INTERNAL MEDICINE

## 2020-05-20 PROCEDURE — 99214 OFFICE O/P EST MOD 30 MIN: CPT | Mod: S$PBB,,, | Performed by: INTERNAL MEDICINE

## 2020-05-20 RX ORDER — LEVOTHYROXINE SODIUM 75 UG/1
75 TABLET ORAL
Qty: 30 TABLET | Refills: 11
Start: 2020-05-20 | End: 2021-02-09

## 2020-05-20 NOTE — PROGRESS NOTES
CHIEF COMPLAINT: Hypothyroid/Low Cortisol  79 year old being seen as a f/u Hypothyroid since late 40's. Time sensitive. On synthroid 75 mcg. Started on hydrocortisone 10/5. She is concerned about weight gain. Also having increased sweating. Having more arthritis pain- back, ankles. No N/V. Not ligheaded. Activity limited due to back pain.          PAST MEDICAL HISTORY/PAST SURGICAL HISTORY:  Reviewed in [facility]    SOCIAL HISTORY: No T/A    FAMILY HISTORY:  Daughter has hyperthyroidism. No DM    MEDICATIONS/ALLERGIES: The patient's MedCard has been updated and reviewed.      ROS:   Constitutional: weight increased  Eyes: No recent visual changes  ENT: No dysphagia  Cardiovascular:No CP  Respiratory: No SOB.   Gastrointestinal: Denies recent bowel disturbances  GenitoUrinary - No dysuria  Skin: No new skin rash  Neurologic: No focal neurologic complaints  Remainder ROS negative        PE:    GENERAL: Well developed, well nourished.  NECK: Supple, trachea midline, No palpable thyroid nodules  CHEST: Resp even and unlabored, CTA bilateral.  CARDIAC: RRR, S1, S2 heard, no murmurs, rubs, S3, or S4    Results for TRINA ALLEN (MRN [id number]) as of 5/20/2020 10:31   Ref. Range 5/13/2020 12:33   Sodium Latest Ref Range: 136 - 145 mmol/L 132 (L)   Potassium Latest Ref Range: 3.5 - 5.1 mmol/L 4.4   Chloride Latest Ref Range: 95 - 110 mmol/L 98   CO2 Latest Ref Range: 23 - 29 mmol/L 25   Anion Gap Latest Ref Range: 8 - 16 mmol/L 9   BUN, Bld Latest Ref Range: 8 - 23 mg/dL 16   Creatinine Latest Ref Range: 0.5 - 1.4 mg/dL 0.8   eGFR if non African American Latest Ref Range: >60 mL/min/1.73 m^2 >60.0   eGFR if African American Latest Ref Range: >60 mL/min/1.73 m^2 >60.0   Glucose Latest Ref Range: 70 - 110 mg/dL 99   Calcium Latest Ref Range: 8.7 - 10.5 mg/dL 9.6   Alkaline Phosphatase Latest Ref Range: 55 - 135 U/L 77   PROTEIN TOTAL Latest Ref Range: 6.0 - 8.4 g/dL 6.5   Albumin Latest Ref Range: 3.5 - 5.2 g/dL 3.7   BILIRUBIN  TOTAL Latest Ref Range: 0.1 - 1.0 mg/dL 0.6   AST Latest Ref Range: 10 - 40 U/L 20   ALT Latest Ref Range: 10 - 44 U/L 20   TSH Latest Ref Range: 0.400 - 4.000 uIU/mL 1.142     ASSESSMENT/PLAN:  1. 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 Undetectable ACTH. Possible 2 mm lesion seen on MRI. However that may be incidental finding and less likely to be the cause of A!. Discussed sick day precautions and wearing medic alert bracelet. Discussed diet as well as exercise.     2. Hyponatremia- Na may improve with Tx of AI.     3. Hypothyroid- TSH WNL>    4. Hyperprolactinemia- FSH and LH not suppressed. Will repeat in a few weeks. Unsure if related to 2 mm lesion seen on MRI.     FOLLOWUP  F/U 6 months with CMP, TSH, FT4  MRI Brain  When comes in for MRI- LH, FSH, Prolactin.

## 2020-06-04 ENCOUNTER — HOSPITAL ENCOUNTER (OUTPATIENT)
Dept: RADIOLOGY | Facility: HOSPITAL | Age: 80
Discharge: HOME | End: 2020-06-04
Attending: INTERNAL MEDICINE
Payer: MEDICARE

## 2020-06-04 DIAGNOSIS — D35.2 BENIGN NEOPLASM OF PITUITARY GLAND: ICD-10-CM

## 2020-06-04 PROCEDURE — 70553 MRI BRAIN STEM W/O & W/DYE: CPT | Mod: TC,PO

## 2020-06-04 PROCEDURE — 70553 MRI BRAIN STEM W/O & W/DYE: CPT | Mod: 26,,, | Performed by: RADIOLOGY

## 2020-06-04 PROCEDURE — 70553 MRI PITUITARY W W/O CONTRAST: ICD-10-PCS | Mod: 26,,, | Performed by: RADIOLOGY

## 2020-06-04 PROCEDURE — A9585 GADOBUTROL INJECTION: HCPCS | Mod: PO | Performed by: INTERNAL MEDICINE

## 2020-06-04 PROCEDURE — 25500020 PHARM REV CODE 255: Mod: PO | Performed by: INTERNAL MEDICINE

## 2020-06-04 RX ORDER — GADOBUTROL 604.72 MG/ML
8 INJECTION INTRAVENOUS
Status: COMPLETED | OUTPATIENT
Start: 2020-06-04 | End: 2020-06-04

## 2020-06-04 RX ADMIN — GADOBUTROL 8 ML: 604.72 INJECTION INTRAVENOUS at 03:06

## 2020-06-09 ENCOUNTER — TELEPHONE (OUTPATIENT)
Dept: ENDOCRINOLOGY | Facility: CLINIC | Age: 80
End: 2020-06-09

## 2020-06-09 DIAGNOSIS — E22.1 HYPERPROLACTINEMIA: Primary | ICD-10-CM

## 2020-06-09 NOTE — TELEPHONE ENCOUNTER
S/w pt, informed of Dr. Naidu's message below. Verbalized understanding.    Dr. Naidu: Pt wants some feedback on her MRI results. Please advise.

## 2020-06-10 PROBLEM — J18.9 COMMUNITY ACQUIRED PNEUMONIA: Status: ACTIVE | Noted: 2020-06-10

## 2020-06-11 PROBLEM — R93.89 ABNORMAL CT SCAN: Status: ACTIVE | Noted: 2020-06-11

## 2020-06-11 PROBLEM — R74.01 TRANSAMINITIS: Status: ACTIVE | Noted: 2020-06-11

## 2020-07-28 ENCOUNTER — TELEPHONE (OUTPATIENT)
Dept: VASCULAR SURGERY | Facility: CLINIC | Age: 80
End: 2020-07-28

## 2020-07-28 NOTE — TELEPHONE ENCOUNTER
----- Message from Radha Gaytan sent at 7/28/2020  1:58 PM CDT -----  Type: Needs Medical Advice  Who Called:  EVA Lewis with [facility]  Best Call Back Number: [phone]  Additional Information: contact regarding patient blood pressure

## 2020-11-09 PROBLEM — R06.02 SOB (SHORTNESS OF BREATH): Status: RESOLVED | Noted: 2018-04-28 | Resolved: 2020-11-09

## 2020-11-09 PROBLEM — I10 UNCONTROLLED HYPERTENSION: Status: RESOLVED | Noted: 2018-04-28 | Resolved: 2020-11-09

## 2020-11-09 PROBLEM — R74.01 TRANSAMINITIS: Status: RESOLVED | Noted: 2020-06-11 | Resolved: 2020-11-09

## 2020-11-09 PROBLEM — R60.0 BILATERAL LEG EDEMA: Status: RESOLVED | Noted: 2017-05-17 | Resolved: 2020-11-09

## 2020-11-09 PROBLEM — R07.9 CHEST PAIN: Status: RESOLVED | Noted: 2018-04-28 | Resolved: 2020-11-09

## 2020-11-11 ENCOUNTER — LAB VISIT (OUTPATIENT)
Dept: LAB | Facility: HOSPITAL | Age: 80
End: 2020-11-11
Attending: INTERNAL MEDICINE
Payer: MEDICARE

## 2020-11-11 DIAGNOSIS — E27.49 SECONDARY ADRENAL INSUFFICIENCY: ICD-10-CM

## 2020-11-11 DIAGNOSIS — D35.2 BENIGN NEOPLASM OF PITUITARY GLAND: ICD-10-CM

## 2020-11-11 DIAGNOSIS — E03.9 HYPOTHYROIDISM, UNSPECIFIED TYPE: ICD-10-CM

## 2020-11-11 LAB
ALBUMIN SERPL BCP-MCNC: 3.4 G/DL (ref 3.5–5.2)
ALP SERPL-CCNC: 62 U/L (ref 55–135)
ALT SERPL W/O P-5'-P-CCNC: 26 U/L (ref 10–44)
ANION GAP SERPL CALC-SCNC: 9 MMOL/L (ref 8–16)
AST SERPL-CCNC: 20 U/L (ref 10–40)
BILIRUB SERPL-MCNC: 0.4 MG/DL (ref 0.1–1)
BUN SERPL-MCNC: 14 MG/DL (ref 8–23)
CALCIUM SERPL-MCNC: 8.7 MG/DL (ref 8.7–10.5)
CHLORIDE SERPL-SCNC: 96 MMOL/L (ref 95–110)
CO2 SERPL-SCNC: 27 MMOL/L (ref 23–29)
CREAT SERPL-MCNC: 0.9 MG/DL (ref 0.5–1.4)
EST. GFR  (AFRICAN AMERICAN): >60 ML/MIN/1.73 M^2
EST. GFR  (NON AFRICAN AMERICAN): >60 ML/MIN/1.73 M^2
GLUCOSE SERPL-MCNC: 110 MG/DL (ref 70–110)
POTASSIUM SERPL-SCNC: 4.2 MMOL/L (ref 3.5–5.1)
PROT SERPL-MCNC: 5.9 G/DL (ref 6–8.4)
SODIUM SERPL-SCNC: 132 MMOL/L (ref 136–145)
T4 FREE SERPL-MCNC: 0.89 NG/DL (ref 0.71–1.51)
TSH SERPL DL<=0.005 MIU/L-ACNC: 1.47 UIU/ML (ref 0.4–4)

## 2020-11-11 PROCEDURE — 36415 COLL VENOUS BLD VENIPUNCTURE: CPT | Mod: PO

## 2020-11-11 PROCEDURE — 84443 ASSAY THYROID STIM HORMONE: CPT

## 2020-11-11 PROCEDURE — 80053 COMPREHEN METABOLIC PANEL: CPT

## 2020-11-11 PROCEDURE — 84439 ASSAY OF FREE THYROXINE: CPT

## 2020-11-18 ENCOUNTER — OFFICE VISIT (OUTPATIENT)
Dept: ENDOCRINOLOGY | Facility: CLINIC | Age: 80
End: 2020-11-18
Payer: MEDICARE

## 2020-11-18 VITALS
OXYGEN SATURATION: 99 % | SYSTOLIC BLOOD PRESSURE: 120 MMHG | BODY MASS INDEX: 30.22 KG/M2 | DIASTOLIC BLOOD PRESSURE: 74 MMHG | WEIGHT: 188 LBS | HEIGHT: 66 IN | HEART RATE: 86 BPM

## 2020-11-18 DIAGNOSIS — E22.1 HYPERPROLACTINEMIA: ICD-10-CM

## 2020-11-18 DIAGNOSIS — D35.2 BENIGN NEOPLASM OF PITUITARY GLAND: ICD-10-CM

## 2020-11-18 DIAGNOSIS — E27.49 SECONDARY ADRENAL INSUFFICIENCY: Primary | ICD-10-CM

## 2020-11-18 DIAGNOSIS — E03.9 HYPOTHYROIDISM, UNSPECIFIED TYPE: ICD-10-CM

## 2020-11-18 PROCEDURE — 99999 PR PBB SHADOW E&M-EST. PATIENT-LVL V: ICD-10-PCS | Mod: PBBFAC,,, | Performed by: INTERNAL MEDICINE

## 2020-11-18 PROCEDURE — 99214 PR OFFICE/OUTPT VISIT, EST, LEVL IV, 30-39 MIN: ICD-10-PCS | Mod: S$PBB,,, | Performed by: INTERNAL MEDICINE

## 2020-11-18 PROCEDURE — 99999 PR PBB SHADOW E&M-EST. PATIENT-LVL V: CPT | Mod: PBBFAC,,, | Performed by: INTERNAL MEDICINE

## 2020-11-18 PROCEDURE — 99214 OFFICE O/P EST MOD 30 MIN: CPT | Mod: S$PBB,,, | Performed by: INTERNAL MEDICINE

## 2020-11-18 PROCEDURE — 99215 OFFICE O/P EST HI 40 MIN: CPT | Mod: PBBFAC,PO | Performed by: INTERNAL MEDICINE

## 2020-11-18 NOTE — PROGRESS NOTES
CHIEF COMPLAINT: Hypothyroid/AI   80 y.o.  being seen as a f/u Hypothyroid since late 40's.  On synthroid 75 mcg. Started on hydrocortisone 10/5. She has some fatigue. Sleeping well. No N/V. Feeling lightheaded when standing. Appears when she had pneumonia, she fell and fractured a rib. States she did have a DEXA done by ortho this year. Taking Ca + D.          PAST MEDICAL HISTORY/PAST SURGICAL HISTORY:  Reviewed in [facility]    SOCIAL HISTORY: No T/A    FAMILY HISTORY:  Daughter has hyperthyroidism. No DM    MEDICATIONS/ALLERGIES: The patient's MedCard has been updated and reviewed.      ROS:   Constitutional: weight stable.   Eyes: No recent visual changes  ENT: No dysphagia  Cardiovascular:No CP  Respiratory: No SOB currently. . In Hospital in June due to pneumonia.   Gastrointestinal: Denies recent bowel disturbances  GenitoUrinary - No dysuria  Skin: No new skin rash  Neurologic: No focal neurologic complaints  Remainder ROS negative        PE:    GENERAL: Well developed, well nourished.  NECK: Supple, trachea midline, No palpable thyroid nodules  CHEST: Resp even and unlabored, CTA bilateral.  CARDIAC: RRR, S1, S2 heard, no murmurs, rubs, S3, or S4    COSYNTROPIN STIM TEST:   Ref. Range 9/19/2019 08:16 9/19/2019 08:50 9/19/2019 09:20   Cortisol Latest Units: ug/dL 2.00 7.70 9.70      Ref. Range 9/19/2019 08:16   ACTH Latest Ref Range: 0 - 46 pg/mL <5     Results for TRINA ALLEN (MRN [id number]) as of 11/18/2020 11:34   Ref. Range 11/11/2020 10:49   Sodium Latest Ref Range: 136 - 145 mmol/L 132 (L)   Potassium Latest Ref Range: 3.5 - 5.1 mmol/L 4.2   Chloride Latest Ref Range: 95 - 110 mmol/L 96   CO2 Latest Ref Range: 23 - 29 mmol/L 27   Anion Gap Latest Ref Range: 8 - 16 mmol/L 9   BUN Latest Ref Range: 8 - 23 mg/dL 14   Creatinine Latest Ref Range: 0.5 - 1.4 mg/dL 0.9   eGFR if non African American Latest Ref Range: >60 mL/min/1.73 m^2 >60.0   eGFR if African American Latest Ref Range: >60 mL/min/1.73 m^2  >60.0   Glucose Latest Ref Range: 70 - 110 mg/dL 110   Calcium Latest Ref Range: 8.7 - 10.5 mg/dL 8.7   Alkaline Phosphatase Latest Ref Range: 55 - 135 U/L 62   PROTEIN TOTAL Latest Ref Range: 6.0 - 8.4 g/dL 5.9 (L)   Albumin Latest Ref Range: 3.5 - 5.2 g/dL 3.4 (L)   BILIRUBIN TOTAL Latest Ref Range: 0.1 - 1.0 mg/dL 0.4   AST Latest Ref Range: 10 - 40 U/L 20   ALT Latest Ref Range: 10 - 44 U/L 26   TSH Latest Ref Range: 0.400 - 4.000 uIU/mL 1.470   Free T4 Latest Ref Range: 0.71 - 1.51 ng/dL 0.89         ASSESSMENT/PLAN:  1. 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 Undetectable ACTH. Possible 2 mm lesion seen on MRI. However that may be incidental finding and less likely to be the cause of AI. Discussed sick day precautions and wearing medic alert bracelet. Discussed diet as well as exercise.     2. Hyponatremia- Na may improve with Tx of AI.     3. Hypothyroid- TSH WNL. Continue current Tx.     4. Hyperprolactinemia- FSH and LH not suppressed. Will repeat in a few weeks. Unsure if related to 2 mm lesion seen on MRI.     FOLLOWUP  Copy of DEXA done by ortho  3 months DELBERT  F/U 6 months with CMP, TSH, FT4, LH, FSH, Prolactin, MRI Brain.

## 2021-01-15 ENCOUNTER — PATIENT MESSAGE (OUTPATIENT)
Dept: ENDOCRINOLOGY | Facility: CLINIC | Age: 81
End: 2021-01-15

## 2021-03-17 ENCOUNTER — LAB VISIT (OUTPATIENT)
Dept: LAB | Facility: HOSPITAL | Age: 81
End: 2021-03-17
Attending: INTERNAL MEDICINE
Payer: MEDICARE

## 2021-03-17 ENCOUNTER — TELEPHONE (OUTPATIENT)
Dept: ENDOCRINOLOGY | Facility: CLINIC | Age: 81
End: 2021-03-17

## 2021-03-17 DIAGNOSIS — E03.9 HYPOTHYROIDISM, UNSPECIFIED TYPE: ICD-10-CM

## 2021-03-17 DIAGNOSIS — E03.9 HYPOTHYROIDISM, UNSPECIFIED TYPE: Primary | ICD-10-CM

## 2021-03-17 PROCEDURE — 36415 COLL VENOUS BLD VENIPUNCTURE: CPT | Mod: PO | Performed by: INTERNAL MEDICINE

## 2021-03-17 PROCEDURE — 84443 ASSAY THYROID STIM HORMONE: CPT | Performed by: INTERNAL MEDICINE

## 2021-03-18 LAB — TSH SERPL DL<=0.005 MIU/L-ACNC: 0.77 UIU/ML (ref 0.4–4)

## 2021-03-18 RX ORDER — LEVOTHYROXINE SODIUM 50 UG/1
50 TABLET ORAL
Qty: 30 TABLET | Refills: 11 | Status: SHIPPED | OUTPATIENT
Start: 2021-03-18 | End: 2022-03-10 | Stop reason: SDUPTHER

## 2021-03-22 ENCOUNTER — TELEPHONE (OUTPATIENT)
Dept: ENDOCRINOLOGY | Facility: CLINIC | Age: 81
End: 2021-03-22

## 2021-04-28 ENCOUNTER — TELEPHONE (OUTPATIENT)
Dept: ENDOCRINOLOGY | Facility: CLINIC | Age: 81
End: 2021-04-28

## 2021-05-13 ENCOUNTER — TELEPHONE (OUTPATIENT)
Dept: ENDOCRINOLOGY | Facility: CLINIC | Age: 81
End: 2021-05-13

## 2021-05-18 ENCOUNTER — HOSPITAL ENCOUNTER (OUTPATIENT)
Dept: RADIOLOGY | Facility: HOSPITAL | Age: 81
Discharge: HOME | End: 2021-05-18
Attending: INTERNAL MEDICINE
Payer: MEDICARE

## 2021-05-18 DIAGNOSIS — D35.2 BENIGN NEOPLASM OF PITUITARY GLAND: ICD-10-CM

## 2021-05-18 PROCEDURE — 25500020 PHARM REV CODE 255: Mod: PO | Performed by: INTERNAL MEDICINE

## 2021-05-18 PROCEDURE — 70553 MRI BRAIN STEM W/O & W/DYE: CPT | Mod: TC,PO

## 2021-05-18 PROCEDURE — 70553 MRI BRAIN W WO CONTRAST: ICD-10-PCS | Mod: 26,,, | Performed by: RADIOLOGY

## 2021-05-18 PROCEDURE — 70553 MRI BRAIN STEM W/O & W/DYE: CPT | Mod: 26,,, | Performed by: RADIOLOGY

## 2021-05-18 PROCEDURE — A9585 GADOBUTROL INJECTION: HCPCS | Mod: PO | Performed by: INTERNAL MEDICINE

## 2021-05-18 RX ORDER — GADOBUTROL 604.72 MG/ML
8 INJECTION INTRAVENOUS
Status: COMPLETED | OUTPATIENT
Start: 2021-05-18 | End: 2021-05-18

## 2021-05-18 RX ADMIN — GADOBUTROL 8 ML: 604.72 INJECTION INTRAVENOUS at 11:05

## 2021-06-15 PROBLEM — Z71.89 ACP (ADVANCE CARE PLANNING): Status: ACTIVE | Noted: 2021-06-15

## 2021-06-15 PROBLEM — N30.00 ACUTE CYSTITIS WITHOUT HEMATURIA: Status: ACTIVE | Noted: 2021-06-15

## 2021-06-15 PROBLEM — R65.20 SEVERE SEPSIS: Status: ACTIVE | Noted: 2021-06-15

## 2021-06-15 PROBLEM — A41.9 SEVERE SEPSIS: Status: ACTIVE | Noted: 2021-06-15

## 2021-06-16 PROBLEM — Z71.89 ACP (ADVANCE CARE PLANNING): Status: RESOLVED | Noted: 2021-06-15 | Resolved: 2021-06-16

## 2021-06-18 ENCOUNTER — HOSPITAL ENCOUNTER (OUTPATIENT)
Facility: HOSPITAL | Age: 81
Discharge: HOME | End: 2021-06-19
Attending: EMERGENCY MEDICINE | Admitting: PSYCHIATRY & NEUROLOGY
Payer: MEDICARE

## 2021-06-18 DIAGNOSIS — G45.9 TRANSIENT CEREBRAL ISCHEMIA, UNSPECIFIED TYPE: ICD-10-CM

## 2021-06-18 DIAGNOSIS — I63.332 THROMBOTIC STROKE INVOLVING LEFT POSTERIOR CEREBRAL ARTERY: ICD-10-CM

## 2021-06-18 DIAGNOSIS — I63.9 STROKE: ICD-10-CM

## 2021-06-18 LAB
ALBUMIN SERPL BCP-MCNC: 3.4 G/DL (ref 3.5–5.2)
ALP SERPL-CCNC: 98 U/L (ref 55–135)
ALT SERPL W/O P-5'-P-CCNC: 19 U/L (ref 10–44)
ANION GAP SERPL CALC-SCNC: 12 MMOL/L (ref 8–16)
AST SERPL-CCNC: 24 U/L (ref 10–40)
BASOPHILS # BLD AUTO: 0.05 K/UL (ref 0–0.2)
BASOPHILS NFR BLD: 0.8 % (ref 0–1.9)
BILIRUB SERPL-MCNC: 0.6 MG/DL (ref 0.1–1)
BUN SERPL-MCNC: 9 MG/DL (ref 6–30)
BUN SERPL-MCNC: 9 MG/DL (ref 8–23)
CALCIUM SERPL-MCNC: 9.2 MG/DL (ref 8.7–10.5)
CHLORIDE SERPL-SCNC: 101 MMOL/L (ref 95–110)
CHLORIDE SERPL-SCNC: 104 MMOL/L (ref 95–110)
CHOLEST SERPL-MCNC: 149 MG/DL (ref 120–199)
CHOLEST/HDLC SERPL: 4 {RATIO} (ref 2–5)
CO2 SERPL-SCNC: 18 MMOL/L (ref 23–29)
CREAT SERPL-MCNC: 0.7 MG/DL (ref 0.5–1.4)
CREAT SERPL-MCNC: 0.7 MG/DL (ref 0.5–1.4)
CTP QC/QA: YES
DIFFERENTIAL METHOD: ABNORMAL
EOSINOPHIL # BLD AUTO: 0.3 K/UL (ref 0–0.5)
EOSINOPHIL NFR BLD: 4.6 % (ref 0–8)
ERYTHROCYTE [DISTWIDTH] IN BLOOD BY AUTOMATED COUNT: 14.5 % (ref 11.5–14.5)
EST. GFR  (AFRICAN AMERICAN): >60 ML/MIN/1.73 M^2
EST. GFR  (NON AFRICAN AMERICAN): >60 ML/MIN/1.73 M^2
GLUCOSE SERPL-MCNC: 102 MG/DL (ref 70–110)
GLUCOSE SERPL-MCNC: 103 MG/DL (ref 70–110)
GLUCOSE SERPL-MCNC: 103 MG/DL (ref 70–110)
HCT VFR BLD AUTO: 37.2 % (ref 37–48.5)
HCT VFR BLD CALC: 37 %PCV (ref 36–54)
HDLC SERPL-MCNC: 37 MG/DL (ref 40–75)
HDLC SERPL: 24.8 % (ref 20–50)
HGB BLD-MCNC: 11.9 G/DL (ref 12–16)
IMM GRANULOCYTES # BLD AUTO: 0.03 K/UL (ref 0–0.04)
IMM GRANULOCYTES NFR BLD AUTO: 0.5 % (ref 0–0.5)
INR PPP: 1 (ref 0.8–1.2)
LDLC SERPL CALC-MCNC: 78.4 MG/DL (ref 63–159)
LYMPHOCYTES # BLD AUTO: 2.3 K/UL (ref 1–4.8)
LYMPHOCYTES NFR BLD: 37.5 % (ref 18–48)
MCH RBC QN AUTO: 27.5 PG (ref 27–31)
MCHC RBC AUTO-ENTMCNC: 32 G/DL (ref 32–36)
MCV RBC AUTO: 86 FL (ref 82–98)
MONOCYTES # BLD AUTO: 0.5 K/UL (ref 0.3–1)
MONOCYTES NFR BLD: 8.7 % (ref 4–15)
NEUTROPHILS # BLD AUTO: 2.9 K/UL (ref 1.8–7.7)
NEUTROPHILS NFR BLD: 47.9 % (ref 38–73)
NONHDLC SERPL-MCNC: 112 MG/DL
NRBC BLD-RTO: 0 /100 WBC
PLATELET # BLD AUTO: 219 K/UL (ref 150–450)
PMV BLD AUTO: 9.2 FL (ref 9.2–12.9)
POC IONIZED CALCIUM: 1.18 MMOL/L (ref 1.06–1.42)
POC TCO2 (MEASURED): 22 MMOL/L (ref 23–29)
POTASSIUM BLD-SCNC: 3.7 MMOL/L (ref 3.5–5.1)
POTASSIUM SERPL-SCNC: 3.8 MMOL/L (ref 3.5–5.1)
PROT SERPL-MCNC: 6.6 G/DL (ref 6–8.4)
PROTHROMBIN TIME: 10.9 SEC (ref 9–12.5)
RBC # BLD AUTO: 4.33 M/UL (ref 4–5.4)
SAMPLE: ABNORMAL
SARS-COV-2 RDRP RESP QL NAA+PROBE: NEGATIVE
SODIUM BLD-SCNC: 136 MMOL/L (ref 136–145)
SODIUM SERPL-SCNC: 134 MMOL/L (ref 136–145)
TRIGL SERPL-MCNC: 168 MG/DL (ref 30–150)
TROPONIN I SERPL DL<=0.01 NG/ML-MCNC: 0.01 NG/ML (ref 0–0.03)
TSH SERPL DL<=0.005 MIU/L-ACNC: 3.9 UIU/ML (ref 0.4–4)
WBC # BLD AUTO: 6.06 K/UL (ref 3.9–12.7)

## 2021-06-18 PROCEDURE — 80053 COMPREHEN METABOLIC PANEL: CPT | Performed by: STUDENT IN AN ORGANIZED HEALTH CARE EDUCATION/TRAINING PROGRAM

## 2021-06-18 PROCEDURE — 25500020 PHARM REV CODE 255: Performed by: EMERGENCY MEDICINE

## 2021-06-18 PROCEDURE — 80047 BASIC METABLC PNL IONIZED CA: CPT | Mod: 59

## 2021-06-18 PROCEDURE — 84484 ASSAY OF TROPONIN QUANT: CPT | Performed by: STUDENT IN AN ORGANIZED HEALTH CARE EDUCATION/TRAINING PROGRAM

## 2021-06-18 PROCEDURE — G0378 HOSPITAL OBSERVATION PER HR: HCPCS

## 2021-06-18 PROCEDURE — 84443 ASSAY THYROID STIM HORMONE: CPT | Performed by: STUDENT IN AN ORGANIZED HEALTH CARE EDUCATION/TRAINING PROGRAM

## 2021-06-18 PROCEDURE — 93005 ELECTROCARDIOGRAM TRACING: CPT

## 2021-06-18 PROCEDURE — 85025 COMPLETE CBC W/AUTO DIFF WBC: CPT | Performed by: STUDENT IN AN ORGANIZED HEALTH CARE EDUCATION/TRAINING PROGRAM

## 2021-06-18 PROCEDURE — 80061 LIPID PANEL: CPT | Performed by: STUDENT IN AN ORGANIZED HEALTH CARE EDUCATION/TRAINING PROGRAM

## 2021-06-18 PROCEDURE — 93010 ELECTROCARDIOGRAM REPORT: CPT | Mod: ,,, | Performed by: INTERNAL MEDICINE

## 2021-06-18 PROCEDURE — 93010 EKG 12-LEAD: ICD-10-PCS | Mod: ,,, | Performed by: INTERNAL MEDICINE

## 2021-06-18 PROCEDURE — 63600175 PHARM REV CODE 636 W HCPCS: Performed by: STUDENT IN AN ORGANIZED HEALTH CARE EDUCATION/TRAINING PROGRAM

## 2021-06-18 PROCEDURE — 85610 PROTHROMBIN TIME: CPT | Performed by: STUDENT IN AN ORGANIZED HEALTH CARE EDUCATION/TRAINING PROGRAM

## 2021-06-18 PROCEDURE — 99285 EMERGENCY DEPT VISIT HI MDM: CPT | Mod: 25

## 2021-06-18 PROCEDURE — 99220 PR INITIAL OBSERVATION CARE,LEVL III: ICD-10-PCS | Mod: ,,, | Performed by: PSYCHIATRY & NEUROLOGY

## 2021-06-18 PROCEDURE — 86803 HEPATITIS C AB TEST: CPT | Performed by: EMERGENCY MEDICINE

## 2021-06-18 PROCEDURE — 99285 EMERGENCY DEPT VISIT HI MDM: CPT | Mod: GC,CS,, | Performed by: EMERGENCY MEDICINE

## 2021-06-18 PROCEDURE — 82962 GLUCOSE BLOOD TEST: CPT

## 2021-06-18 PROCEDURE — 99220 PR INITIAL OBSERVATION CARE,LEVL III: CPT | Mod: ,,, | Performed by: PSYCHIATRY & NEUROLOGY

## 2021-06-18 PROCEDURE — U0002 COVID-19 LAB TEST NON-CDC: HCPCS | Performed by: NURSE PRACTITIONER

## 2021-06-18 PROCEDURE — 96374 THER/PROPH/DIAG INJ IV PUSH: CPT | Mod: 59

## 2021-06-18 PROCEDURE — 99285 PR EMERGENCY DEPT VISIT,LEVEL V: ICD-10-PCS | Mod: GC,CS,, | Performed by: EMERGENCY MEDICINE

## 2021-06-18 RX ORDER — ACETAMINOPHEN 325 MG/1
650 TABLET ORAL EVERY 6 HOURS PRN
Status: DISCONTINUED | OUTPATIENT
Start: 2021-06-19 | End: 2021-06-19 | Stop reason: HOSPADM

## 2021-06-18 RX ORDER — HYDROCORTISONE 5 MG/1
10 TABLET ORAL EVERY MORNING
Status: DISCONTINUED | OUTPATIENT
Start: 2021-06-19 | End: 2021-06-19 | Stop reason: HOSPADM

## 2021-06-18 RX ORDER — ONDANSETRON 8 MG/1
8 TABLET, ORALLY DISINTEGRATING ORAL EVERY 8 HOURS PRN
Status: DISCONTINUED | OUTPATIENT
Start: 2021-06-19 | End: 2021-06-19 | Stop reason: HOSPADM

## 2021-06-18 RX ORDER — VENLAFAXINE HYDROCHLORIDE 150 MG/1
150 CAPSULE, EXTENDED RELEASE ORAL EVERY MORNING
Status: DISCONTINUED | OUTPATIENT
Start: 2021-06-19 | End: 2021-06-19 | Stop reason: HOSPADM

## 2021-06-18 RX ORDER — LABETALOL HCL 20 MG/4 ML
10 SYRINGE (ML) INTRAVENOUS EVERY 6 HOURS PRN
Status: DISCONTINUED | OUTPATIENT
Start: 2021-06-19 | End: 2021-06-19 | Stop reason: HOSPADM

## 2021-06-18 RX ORDER — PANTOPRAZOLE SODIUM 40 MG/1
40 TABLET, DELAYED RELEASE ORAL DAILY
Status: DISCONTINUED | OUTPATIENT
Start: 2021-06-19 | End: 2021-06-19 | Stop reason: HOSPADM

## 2021-06-18 RX ORDER — ONDANSETRON 2 MG/ML
4 INJECTION INTRAMUSCULAR; INTRAVENOUS EVERY 12 HOURS PRN
Status: DISCONTINUED | OUTPATIENT
Start: 2021-06-19 | End: 2021-06-19 | Stop reason: HOSPADM

## 2021-06-18 RX ORDER — ATORVASTATIN CALCIUM 20 MG/1
80 TABLET, FILM COATED ORAL DAILY
Status: DISCONTINUED | OUTPATIENT
Start: 2021-06-19 | End: 2021-06-19 | Stop reason: HOSPADM

## 2021-06-18 RX ORDER — LEVOTHYROXINE SODIUM 50 UG/1
50 TABLET ORAL
Status: DISCONTINUED | OUTPATIENT
Start: 2021-06-19 | End: 2021-06-19 | Stop reason: HOSPADM

## 2021-06-18 RX ORDER — IPRATROPIUM BROMIDE 42 UG/1
2 SPRAY, METERED NASAL 2 TIMES DAILY
Status: DISCONTINUED | OUTPATIENT
Start: 2021-06-19 | End: 2021-06-19 | Stop reason: HOSPADM

## 2021-06-18 RX ORDER — HEPARIN SODIUM 5000 [USP'U]/ML
5000 INJECTION, SOLUTION INTRAVENOUS; SUBCUTANEOUS EVERY 8 HOURS
Status: DISCONTINUED | OUTPATIENT
Start: 2021-06-19 | End: 2021-06-19 | Stop reason: HOSPADM

## 2021-06-18 RX ORDER — SODIUM CHLORIDE 0.9 % (FLUSH) 0.9 %
10 SYRINGE (ML) INJECTION
Status: DISCONTINUED | OUTPATIENT
Start: 2021-06-19 | End: 2021-06-19 | Stop reason: HOSPADM

## 2021-06-18 RX ORDER — DIPHENHYDRAMINE HYDROCHLORIDE 50 MG/ML
25 INJECTION INTRAMUSCULAR; INTRAVENOUS
Status: DISPENSED | OUTPATIENT
Start: 2021-06-18 | End: 2021-06-19

## 2021-06-18 RX ORDER — CIPROFLOXACIN 500 MG/1
500 TABLET ORAL EVERY 12 HOURS
Status: DISCONTINUED | OUTPATIENT
Start: 2021-06-18 | End: 2021-06-19 | Stop reason: HOSPADM

## 2021-06-18 RX ORDER — PREGABALIN 75 MG/1
75 CAPSULE ORAL 3 TIMES DAILY
Status: DISCONTINUED | OUTPATIENT
Start: 2021-06-19 | End: 2021-06-19 | Stop reason: HOSPADM

## 2021-06-18 RX ORDER — CLOPIDOGREL BISULFATE 75 MG/1
75 TABLET ORAL DAILY
Status: DISCONTINUED | OUTPATIENT
Start: 2021-06-19 | End: 2021-06-19 | Stop reason: HOSPADM

## 2021-06-18 RX ORDER — LORAZEPAM 2 MG/ML
0.5 INJECTION INTRAMUSCULAR
Status: COMPLETED | OUTPATIENT
Start: 2021-06-18 | End: 2021-06-18

## 2021-06-18 RX ORDER — MONTELUKAST SODIUM 10 MG/1
10 TABLET ORAL NIGHTLY
Status: DISCONTINUED | OUTPATIENT
Start: 2021-06-18 | End: 2021-06-19 | Stop reason: HOSPADM

## 2021-06-18 RX ORDER — ALBUTEROL SULFATE 90 UG/1
2 AEROSOL, METERED RESPIRATORY (INHALATION) EVERY 6 HOURS PRN
Status: DISCONTINUED | OUTPATIENT
Start: 2021-06-19 | End: 2021-06-19 | Stop reason: HOSPADM

## 2021-06-18 RX ADMIN — IOHEXOL 100 ML: 350 INJECTION, SOLUTION INTRAVENOUS at 09:06

## 2021-06-18 RX ADMIN — LORAZEPAM 0.5 MG: 2 INJECTION INTRAMUSCULAR; INTRAVENOUS at 10:06

## 2021-06-19 VITALS
SYSTOLIC BLOOD PRESSURE: 160 MMHG | TEMPERATURE: 99 F | OXYGEN SATURATION: 97 % | HEIGHT: 66 IN | HEART RATE: 74 BPM | WEIGHT: 179 LBS | DIASTOLIC BLOOD PRESSURE: 70 MMHG | BODY MASS INDEX: 28.77 KG/M2 | RESPIRATION RATE: 18 BRPM

## 2021-06-19 PROBLEM — H53.9 VISION DISTURBANCE: Status: ACTIVE | Noted: 2021-06-19

## 2021-06-19 PROBLEM — G93.6 CYTOTOXIC CEREBRAL EDEMA: Status: ACTIVE | Noted: 2021-06-19

## 2021-06-19 LAB
ALBUMIN SERPL BCP-MCNC: 2.9 G/DL (ref 3.5–5.2)
ALP SERPL-CCNC: 91 U/L (ref 55–135)
ALT SERPL W/O P-5'-P-CCNC: 18 U/L (ref 10–44)
ANION GAP SERPL CALC-SCNC: 10 MMOL/L (ref 8–16)
APTT BLDCRRT: 26.5 SEC (ref 21–32)
ASCENDING AORTA: 2.45 CM
AST SERPL-CCNC: 24 U/L (ref 10–40)
AV INDEX (PROSTH): 0.71
AV MEAN GRADIENT: 6 MMHG
AV PEAK GRADIENT: 10 MMHG
AV VALVE AREA: 2.09 CM2
AV VELOCITY RATIO: 0.74
BASOPHILS # BLD AUTO: 0.05 K/UL (ref 0–0.2)
BASOPHILS NFR BLD: 0.8 % (ref 0–1.9)
BILIRUB SERPL-MCNC: 0.5 MG/DL (ref 0.1–1)
BSA FOR ECHO PROCEDURE: 1.94 M2
BUN SERPL-MCNC: 8 MG/DL (ref 8–23)
CALCIUM SERPL-MCNC: 8.9 MG/DL (ref 8.7–10.5)
CHLORIDE SERPL-SCNC: 103 MMOL/L (ref 95–110)
CK MB SERPL-MCNC: 1.8 NG/ML (ref 0.1–6.5)
CK MB SERPL-RTO: 3.6 % (ref 0–5)
CK SERPL-CCNC: 50 U/L (ref 20–180)
CO2 SERPL-SCNC: 23 MMOL/L (ref 23–29)
CREAT SERPL-MCNC: 0.7 MG/DL (ref 0.5–1.4)
CV ECHO LV RWT: 0.55 CM
DIFFERENTIAL METHOD: ABNORMAL
DOP CALC AO PEAK VEL: 1.62 M/S
DOP CALC AO VTI: 35.99 CM
DOP CALC LVOT AREA: 3 CM2
DOP CALC LVOT DIAMETER: 1.94 CM
DOP CALC LVOT PEAK VEL: 1.2 M/S
DOP CALC LVOT STROKE VOLUME: 75.34 CM3
DOP CALCLVOT PEAK VEL VTI: 25.5 CM
E WAVE DECELERATION TIME: 153.46 MSEC
E/A RATIO: 0.7
E/E' RATIO: 18.77 M/S
ECHO LV POSTERIOR WALL: 0.95 CM (ref 0.6–1.1)
EJECTION FRACTION: 65 %
EOSINOPHIL # BLD AUTO: 0.3 K/UL (ref 0–0.5)
EOSINOPHIL NFR BLD: 5 % (ref 0–8)
ERYTHROCYTE [DISTWIDTH] IN BLOOD BY AUTOMATED COUNT: 14.4 % (ref 11.5–14.5)
EST. GFR  (AFRICAN AMERICAN): >60 ML/MIN/1.73 M^2
EST. GFR  (NON AFRICAN AMERICAN): >60 ML/MIN/1.73 M^2
ESTIMATED AVG GLUCOSE: 114 MG/DL (ref 68–131)
FRACTIONAL SHORTENING: 26 % (ref 28–44)
GLUCOSE SERPL-MCNC: 88 MG/DL (ref 70–110)
HBA1C MFR BLD: 5.6 % (ref 4–5.6)
HCT VFR BLD AUTO: 34.2 % (ref 37–48.5)
HGB BLD-MCNC: 11 G/DL (ref 12–16)
HR MV ECHO: 90 BPM
IMM GRANULOCYTES # BLD AUTO: 0.03 K/UL (ref 0–0.04)
IMM GRANULOCYTES NFR BLD AUTO: 0.5 % (ref 0–0.5)
INR PPP: 1 (ref 0.8–1.2)
INTERVENTRICULAR SEPTUM: 1.13 CM (ref 0.6–1.1)
IVRT: 68.51 MSEC
LA MAJOR: 6.05 CM
LA MINOR: 6.09 CM
LA WIDTH: 3.35 CM
LEFT ATRIUM SIZE: 3.51 CM
LEFT ATRIUM VOLUME INDEX MOD: 24.1 ML/M2
LEFT ATRIUM VOLUME INDEX: 31.8 ML/M2
LEFT ATRIUM VOLUME MOD: 46 CM3
LEFT ATRIUM VOLUME: 60.67 CM3
LEFT INTERNAL DIMENSION IN SYSTOLE: 2.53 CM (ref 2.1–4)
LEFT VENTRICLE DIASTOLIC VOLUME INDEX: 25.54 ML/M2
LEFT VENTRICLE DIASTOLIC VOLUME: 48.78 ML
LEFT VENTRICLE MASS INDEX: 56 G/M2
LEFT VENTRICLE SYSTOLIC VOLUME INDEX: 12 ML/M2
LEFT VENTRICLE SYSTOLIC VOLUME: 22.91 ML
LEFT VENTRICULAR INTERNAL DIMENSION IN DIASTOLE: 3.44 CM (ref 3.5–6)
LEFT VENTRICULAR MASS: 106.67 G
LV LATERAL E/E' RATIO: 20.33 M/S
LV SEPTAL E/E' RATIO: 17.43 M/S
LYMPHOCYTES # BLD AUTO: 1.8 K/UL (ref 1–4.8)
LYMPHOCYTES NFR BLD: 30.2 % (ref 18–48)
MAGNESIUM SERPL-MCNC: 1.6 MG/DL (ref 1.6–2.6)
MCH RBC QN AUTO: 27.5 PG (ref 27–31)
MCHC RBC AUTO-ENTMCNC: 32.2 G/DL (ref 32–36)
MCV RBC AUTO: 86 FL (ref 82–98)
MONOCYTES # BLD AUTO: 0.6 K/UL (ref 0.3–1)
MONOCYTES NFR BLD: 10.2 % (ref 4–15)
MV MEAN GRADIENT: 5 MMHG
MV PEAK A VEL: 1.74 M/S
MV PEAK E VEL: 1.22 M/S
MV STENOSIS PRESSURE HALF TIME: 44.5 MS
MV VALVE AREA P 1/2 METHOD: 4.94 CM2
NEUTROPHILS # BLD AUTO: 3.2 K/UL (ref 1.8–7.7)
NEUTROPHILS NFR BLD: 53.3 % (ref 38–73)
NRBC BLD-RTO: 0 /100 WBC
PHOSPHATE SERPL-MCNC: 3.4 MG/DL (ref 2.7–4.5)
PISA TR MAX VEL: 3.2 M/S
PLATELET # BLD AUTO: 225 K/UL (ref 150–450)
PMV BLD AUTO: 9.6 FL (ref 9.2–12.9)
POTASSIUM SERPL-SCNC: 3.5 MMOL/L (ref 3.5–5.1)
PROT SERPL-MCNC: 5.7 G/DL (ref 6–8.4)
PROTHROMBIN TIME: 11 SEC (ref 9–12.5)
RA MAJOR: 4.41 CM
RA PRESSURE: 3 MMHG
RA WIDTH: 3.26 CM
RBC # BLD AUTO: 4 M/UL (ref 4–5.4)
RIGHT ATRIAL AREA: 14 CM2
RIGHT VENTRICULAR END-DIASTOLIC DIMENSION: 2.55 CM
RV TISSUE DOPPLER FREE WALL SYSTOLIC VELOCITY 1 (APICAL 4 CHAMBER VIEW): 13.4 CM/S
SINUS: 2.49 CM
SODIUM SERPL-SCNC: 136 MMOL/L (ref 136–145)
STJ: 2.26 CM
TDI LATERAL: 0.06 M/S
TDI SEPTAL: 0.07 M/S
TDI: 0.07 M/S
TR MAX PG: 41 MMHG
TRICUSPID ANNULAR PLANE SYSTOLIC EXCURSION: 1.69 CM
TROPONIN I SERPL DL<=0.01 NG/ML-MCNC: 0.02 NG/ML (ref 0–0.03)
TV REST PULMONARY ARTERY PRESSURE: 44 MMHG
WBC # BLD AUTO: 5.96 K/UL (ref 3.9–12.7)

## 2021-06-19 PROCEDURE — 97535 SELF CARE MNGMENT TRAINING: CPT | Mod: 59

## 2021-06-19 PROCEDURE — 99217 PR OBSERVATION CARE DISCHARGE: CPT | Mod: ,,, | Performed by: PSYCHIATRY & NEUROLOGY

## 2021-06-19 PROCEDURE — 99217 PR OBSERVATION CARE DISCHARGE: ICD-10-PCS | Mod: ,,, | Performed by: PSYCHIATRY & NEUROLOGY

## 2021-06-19 PROCEDURE — 96372 THER/PROPH/DIAG INJ SC/IM: CPT

## 2021-06-19 PROCEDURE — G0378 HOSPITAL OBSERVATION PER HR: HCPCS

## 2021-06-19 PROCEDURE — 25000003 PHARM REV CODE 250: Performed by: NURSE PRACTITIONER

## 2021-06-19 PROCEDURE — 84100 ASSAY OF PHOSPHORUS: CPT | Performed by: NURSE PRACTITIONER

## 2021-06-19 PROCEDURE — 83036 HEMOGLOBIN GLYCOSYLATED A1C: CPT | Performed by: NURSE PRACTITIONER

## 2021-06-19 PROCEDURE — 97129 THER IVNTJ 1ST 15 MIN: CPT

## 2021-06-19 PROCEDURE — 80053 COMPREHEN METABOLIC PANEL: CPT | Performed by: NURSE PRACTITIONER

## 2021-06-19 PROCEDURE — 36415 COLL VENOUS BLD VENIPUNCTURE: CPT | Performed by: NURSE PRACTITIONER

## 2021-06-19 PROCEDURE — 85025 COMPLETE CBC W/AUTO DIFF WBC: CPT | Performed by: NURSE PRACTITIONER

## 2021-06-19 PROCEDURE — 83735 ASSAY OF MAGNESIUM: CPT | Performed by: NURSE PRACTITIONER

## 2021-06-19 PROCEDURE — 63600175 PHARM REV CODE 636 W HCPCS: Performed by: NURSE PRACTITIONER

## 2021-06-19 PROCEDURE — 85730 THROMBOPLASTIN TIME PARTIAL: CPT | Performed by: NURSE PRACTITIONER

## 2021-06-19 PROCEDURE — 92610 EVALUATE SWALLOWING FUNCTION: CPT

## 2021-06-19 PROCEDURE — 97530 THERAPEUTIC ACTIVITIES: CPT

## 2021-06-19 PROCEDURE — 84484 ASSAY OF TROPONIN QUANT: CPT | Performed by: NURSE PRACTITIONER

## 2021-06-19 PROCEDURE — 82550 ASSAY OF CK (CPK): CPT | Performed by: NURSE PRACTITIONER

## 2021-06-19 PROCEDURE — 97165 OT EVAL LOW COMPLEX 30 MIN: CPT

## 2021-06-19 PROCEDURE — 85610 PROTHROMBIN TIME: CPT | Performed by: NURSE PRACTITIONER

## 2021-06-19 RX ORDER — NAPROXEN SODIUM 220 MG/1
81 TABLET, FILM COATED ORAL DAILY
Status: DISCONTINUED | OUTPATIENT
Start: 2021-06-20 | End: 2021-06-19 | Stop reason: HOSPADM

## 2021-06-19 RX ADMIN — CIPROFLOXACIN 500 MG: 500 TABLET, FILM COATED ORAL at 12:06

## 2021-06-19 RX ADMIN — ATORVASTATIN CALCIUM 80 MG: 20 TABLET, FILM COATED ORAL at 09:06

## 2021-06-19 RX ADMIN — MONTELUKAST 10 MG: 10 TABLET, FILM COATED ORAL at 12:06

## 2021-06-19 RX ADMIN — PREGABALIN 75 MG: 75 CAPSULE ORAL at 09:06

## 2021-06-19 RX ADMIN — HYDROCORTISONE 10 MG: 5 TABLET ORAL at 06:06

## 2021-06-19 RX ADMIN — HEPARIN SODIUM 5000 UNITS: 5000 INJECTION INTRAVENOUS; SUBCUTANEOUS at 06:06

## 2021-06-19 RX ADMIN — CIPROFLOXACIN 500 MG: 500 TABLET, FILM COATED ORAL at 09:06

## 2021-06-19 RX ADMIN — PANTOPRAZOLE SODIUM 40 MG: 40 TABLET, DELAYED RELEASE ORAL at 09:06

## 2021-06-19 RX ADMIN — VENLAFAXINE HYDROCHLORIDE 150 MG: 150 CAPSULE, EXTENDED RELEASE ORAL at 06:06

## 2021-06-19 RX ADMIN — LEVOTHYROXINE SODIUM 50 MCG: 50 TABLET ORAL at 06:06

## 2021-06-21 ENCOUNTER — TELEPHONE (OUTPATIENT)
Dept: NEUROLOGY | Facility: CLINIC | Age: 81
End: 2021-06-21

## 2021-06-21 ENCOUNTER — TELEPHONE (OUTPATIENT)
Dept: ENDOCRINOLOGY | Facility: CLINIC | Age: 81
End: 2021-06-21

## 2021-06-21 LAB — HCV AB SERPL QL IA: NEGATIVE

## 2021-06-25 ENCOUNTER — CLINICAL SUPPORT (OUTPATIENT)
Dept: CARDIOLOGY | Facility: HOSPITAL | Age: 81
End: 2021-06-25
Attending: NURSE PRACTITIONER
Payer: MEDICARE

## 2021-06-25 DIAGNOSIS — G45.9 TRANSIENT CEREBRAL ISCHEMIA, UNSPECIFIED TYPE: ICD-10-CM

## 2021-06-25 DIAGNOSIS — I63.332 THROMBOTIC STROKE INVOLVING LEFT POSTERIOR CEREBRAL ARTERY: ICD-10-CM

## 2021-06-25 PROCEDURE — 93271 ECG/MONITORING AND ANALYSIS: CPT

## 2021-06-25 PROCEDURE — 93272 CARDIAC EVENT MONITOR (CUPID ONLY): ICD-10-PCS | Mod: ,,, | Performed by: INTERNAL MEDICINE

## 2021-06-25 PROCEDURE — 93272 ECG/REVIEW INTERPRET ONLY: CPT | Mod: ,,, | Performed by: INTERNAL MEDICINE

## 2021-07-07 PROBLEM — N30.00 ACUTE CYSTITIS WITHOUT HEMATURIA: Status: RESOLVED | Noted: 2021-06-15 | Resolved: 2021-07-07

## 2021-07-07 PROBLEM — J18.9 COMMUNITY ACQUIRED PNEUMONIA: Status: RESOLVED | Noted: 2020-06-10 | Resolved: 2021-07-07

## 2021-07-07 PROBLEM — R65.20 SEVERE SEPSIS: Status: RESOLVED | Noted: 2021-06-15 | Resolved: 2021-07-07

## 2021-07-07 PROBLEM — A41.9 SEVERE SEPSIS: Status: RESOLVED | Noted: 2021-06-15 | Resolved: 2021-07-07

## 2021-08-16 ENCOUNTER — OFFICE VISIT (OUTPATIENT)
Dept: ENDOCRINOLOGY | Facility: CLINIC | Age: 81
End: 2021-08-16
Payer: MEDICARE

## 2021-08-16 VITALS
HEIGHT: 66 IN | BODY MASS INDEX: 25.88 KG/M2 | WEIGHT: 161 LBS | SYSTOLIC BLOOD PRESSURE: 128 MMHG | DIASTOLIC BLOOD PRESSURE: 60 MMHG | OXYGEN SATURATION: 99 % | HEART RATE: 89 BPM

## 2021-08-16 DIAGNOSIS — E87.1 HYPONATREMIA: ICD-10-CM

## 2021-08-16 DIAGNOSIS — E03.9 HYPOTHYROIDISM, UNSPECIFIED TYPE: Primary | ICD-10-CM

## 2021-08-16 DIAGNOSIS — E22.1 HYPERPROLACTINEMIA: ICD-10-CM

## 2021-08-16 DIAGNOSIS — E27.49 SECONDARY ADRENAL INSUFFICIENCY: ICD-10-CM

## 2021-08-16 PROCEDURE — 99214 PR OFFICE/OUTPT VISIT, EST, LEVL IV, 30-39 MIN: ICD-10-PCS | Mod: S$PBB,,, | Performed by: INTERNAL MEDICINE

## 2021-08-16 PROCEDURE — 99999 PR PBB SHADOW E&M-EST. PATIENT-LVL V: ICD-10-PCS | Mod: PBBFAC,,, | Performed by: INTERNAL MEDICINE

## 2021-08-16 PROCEDURE — 99214 OFFICE O/P EST MOD 30 MIN: CPT | Mod: S$PBB,,, | Performed by: INTERNAL MEDICINE

## 2021-08-16 PROCEDURE — 99215 OFFICE O/P EST HI 40 MIN: CPT | Mod: PBBFAC,PO | Performed by: INTERNAL MEDICINE

## 2021-08-16 PROCEDURE — 99999 PR PBB SHADOW E&M-EST. PATIENT-LVL V: CPT | Mod: PBBFAC,,, | Performed by: INTERNAL MEDICINE

## 2021-08-19 ENCOUNTER — OFFICE VISIT (OUTPATIENT)
Dept: NEUROLOGY | Facility: CLINIC | Age: 81
End: 2021-08-19
Payer: MEDICARE

## 2021-08-19 VITALS
WEIGHT: 160.94 LBS | DIASTOLIC BLOOD PRESSURE: 66 MMHG | BODY MASS INDEX: 25.86 KG/M2 | HEIGHT: 66 IN | SYSTOLIC BLOOD PRESSURE: 119 MMHG | HEART RATE: 86 BPM

## 2021-08-19 DIAGNOSIS — I10 ESSENTIAL (PRIMARY) HYPERTENSION: ICD-10-CM

## 2021-08-19 DIAGNOSIS — I25.10 CORONARY ARTERY DISEASE INVOLVING NATIVE CORONARY ARTERY OF NATIVE HEART WITHOUT ANGINA PECTORIS: ICD-10-CM

## 2021-08-19 DIAGNOSIS — H53.461 QUADRANTANOPSIA, RIGHT: ICD-10-CM

## 2021-08-19 DIAGNOSIS — I63.332 THROMBOTIC STROKE INVOLVING LEFT POSTERIOR CEREBRAL ARTERY: Primary | ICD-10-CM

## 2021-08-19 DIAGNOSIS — E78.2 MIXED HYPERLIPIDEMIA: ICD-10-CM

## 2021-08-19 DIAGNOSIS — H53.9 VISION DISTURBANCE: ICD-10-CM

## 2021-08-19 PROCEDURE — 99999 PR PBB SHADOW E&M-EST. PATIENT-LVL IV: CPT | Mod: PBBFAC,GC,, | Performed by: STUDENT IN AN ORGANIZED HEALTH CARE EDUCATION/TRAINING PROGRAM

## 2021-08-19 PROCEDURE — 99214 OFFICE O/P EST MOD 30 MIN: CPT | Mod: PBBFAC | Performed by: STUDENT IN AN ORGANIZED HEALTH CARE EDUCATION/TRAINING PROGRAM

## 2021-08-19 PROCEDURE — 99214 PR OFFICE/OUTPT VISIT, EST, LEVL IV, 30-39 MIN: ICD-10-PCS | Mod: S$PBB,GC,, | Performed by: PSYCHIATRY & NEUROLOGY

## 2021-08-19 PROCEDURE — 99999 PR PBB SHADOW E&M-EST. PATIENT-LVL IV: ICD-10-PCS | Mod: PBBFAC,GC,, | Performed by: STUDENT IN AN ORGANIZED HEALTH CARE EDUCATION/TRAINING PROGRAM

## 2021-08-19 PROCEDURE — 99214 OFFICE O/P EST MOD 30 MIN: CPT | Mod: S$PBB,GC,, | Performed by: PSYCHIATRY & NEUROLOGY

## 2021-09-20 ENCOUNTER — IMMUNIZATION (OUTPATIENT)
Dept: FAMILY MEDICINE | Facility: CLINIC | Age: 81
End: 2021-09-20
Payer: MEDICARE

## 2021-09-20 DIAGNOSIS — Z23 NEED FOR VACCINATION: Primary | ICD-10-CM

## 2021-09-20 PROCEDURE — 91300 COVID-19, MRNA, LNP-S, PF, 30 MCG/0.3 ML DOSE VACCINE: CPT | Mod: PBBFAC,PO

## 2021-09-20 PROCEDURE — 0003A COVID-19, MRNA, LNP-S, PF, 30 MCG/0.3 ML DOSE VACCINE: CPT | Mod: PBBFAC,PO

## 2021-10-07 ENCOUNTER — LAB VISIT (OUTPATIENT)
Dept: LAB | Facility: HOSPITAL | Age: 81
End: 2021-10-07
Attending: INTERNAL MEDICINE
Payer: MEDICARE

## 2021-10-07 DIAGNOSIS — E03.9 HYPOTHYROIDISM, UNSPECIFIED TYPE: ICD-10-CM

## 2021-10-07 LAB — TSH SERPL DL<=0.005 MIU/L-ACNC: 1.66 UIU/ML (ref 0.4–4)

## 2021-10-07 PROCEDURE — 36415 COLL VENOUS BLD VENIPUNCTURE: CPT | Mod: PO | Performed by: INTERNAL MEDICINE

## 2021-10-07 PROCEDURE — 84443 ASSAY THYROID STIM HORMONE: CPT | Performed by: INTERNAL MEDICINE

## 2021-10-20 ENCOUNTER — HOME CARE VISIT (OUTPATIENT)
Dept: NEUROLOGY | Facility: HOSPITAL | Age: 81
End: 2021-10-20

## 2021-10-20 VITALS
HEART RATE: 80 BPM | DIASTOLIC BLOOD PRESSURE: 76 MMHG | RESPIRATION RATE: 20 BRPM | SYSTOLIC BLOOD PRESSURE: 122 MMHG | OXYGEN SATURATION: 97 %

## 2022-01-13 PROBLEM — D35.2 BENIGN NEOPLASM OF PITUITARY GLAND: Status: ACTIVE | Noted: 2022-01-13

## 2022-01-13 PROBLEM — H43.11 VITREOUS HEMORRHAGE OF RIGHT EYE: Status: ACTIVE | Noted: 2022-01-13

## 2022-01-13 PROBLEM — D83.0 COMMON VARIABLE IMMUNODEFICIENCY WITH PREDOMINANT ABNORMALITIES OF B-CELL NUMBERS AND FUNCTION: Status: ACTIVE | Noted: 2022-01-13

## 2022-01-25 ENCOUNTER — OUTPATIENT CASE MANAGEMENT (OUTPATIENT)
Dept: ADMINISTRATIVE | Facility: OTHER | Age: 82
End: 2022-01-25
Payer: MEDICARE

## 2022-01-25 NOTE — PROGRESS NOTES
2/8/22 Spoke with patient regarding enrollment in outpatient case management program. Patient states she is moving out of the area and will no longer be with the current healthcare providers. Patient declines OPCM enrollment at this time. Mailing her OPCM brochure and RN contact information. Case Closure. Message sent to Selin Knight NP - PCP.      1/25/22   Spoke with patient regarding enrollment in outpatient case management program. Patient is agreeable but unavailable to complete assessment today. Patient requested that RN call back on Thursday 2/3/22 at 1pm. RN OPCM 1st assessment attempt.

## 2022-01-31 ENCOUNTER — TELEPHONE (OUTPATIENT)
Dept: ENDOCRINOLOGY | Facility: CLINIC | Age: 82
End: 2022-01-31
Payer: MEDICARE

## 2022-01-31 NOTE — TELEPHONE ENCOUNTER
----- Message from Daysi Cardozo sent at 1/31/2022 12:51 PM CST -----  Contact: patient  Type:  Patient Returning Call    Who Called:  patient  Who Left Message for Patient:  Lima  Does the patient know what this is regarding?:    Best Call Back Number:  [phone] (home)   Additional Information:

## 2022-02-16 ENCOUNTER — LAB VISIT (OUTPATIENT)
Dept: LAB | Facility: HOSPITAL | Age: 82
End: 2022-02-16
Attending: INTERNAL MEDICINE
Payer: MEDICARE

## 2022-02-16 DIAGNOSIS — E27.49 SECONDARY ADRENAL INSUFFICIENCY: ICD-10-CM

## 2022-02-16 DIAGNOSIS — E87.1 HYPONATREMIA: ICD-10-CM

## 2022-02-16 DIAGNOSIS — E03.9 HYPOTHYROIDISM, UNSPECIFIED TYPE: ICD-10-CM

## 2022-02-16 DIAGNOSIS — E22.1 HYPERPROLACTINEMIA: ICD-10-CM

## 2022-02-16 LAB
ALBUMIN SERPL BCP-MCNC: 3.6 G/DL (ref 3.5–5.2)
ALP SERPL-CCNC: 79 U/L (ref 55–135)
ALT SERPL W/O P-5'-P-CCNC: 28 U/L (ref 10–44)
ANION GAP SERPL CALC-SCNC: 11 MMOL/L (ref 8–16)
AST SERPL-CCNC: 29 U/L (ref 10–40)
BILIRUB SERPL-MCNC: 0.5 MG/DL (ref 0.1–1)
BUN SERPL-MCNC: 24 MG/DL (ref 8–23)
CALCIUM SERPL-MCNC: 9.2 MG/DL (ref 8.7–10.5)
CHLORIDE SERPL-SCNC: 98 MMOL/L (ref 95–110)
CO2 SERPL-SCNC: 25 MMOL/L (ref 23–29)
CREAT SERPL-MCNC: 0.9 MG/DL (ref 0.5–1.4)
EST. GFR  (AFRICAN AMERICAN): >60 ML/MIN/1.73 M^2
EST. GFR  (NON AFRICAN AMERICAN): >60 ML/MIN/1.73 M^2
FSH SERPL-ACNC: 84.62 MIU/ML
GLUCOSE SERPL-MCNC: 79 MG/DL (ref 70–110)
LH SERPL-ACNC: 28.9 MIU/ML
POTASSIUM SERPL-SCNC: 3.9 MMOL/L (ref 3.5–5.1)
PROLACTIN SERPL IA-MCNC: 47.3 NG/ML (ref 5.2–26.5)
PROT SERPL-MCNC: 6.4 G/DL (ref 6–8.4)
SODIUM SERPL-SCNC: 134 MMOL/L (ref 136–145)
T4 FREE SERPL-MCNC: 0.74 NG/DL (ref 0.71–1.51)
TSH SERPL DL<=0.005 MIU/L-ACNC: 4.14 UIU/ML (ref 0.4–4)

## 2022-02-16 PROCEDURE — 36415 COLL VENOUS BLD VENIPUNCTURE: CPT | Mod: PO | Performed by: INTERNAL MEDICINE

## 2022-02-16 PROCEDURE — 84439 ASSAY OF FREE THYROXINE: CPT | Performed by: INTERNAL MEDICINE

## 2022-02-16 PROCEDURE — 84443 ASSAY THYROID STIM HORMONE: CPT | Performed by: INTERNAL MEDICINE

## 2022-02-16 PROCEDURE — 80053 COMPREHEN METABOLIC PANEL: CPT | Performed by: INTERNAL MEDICINE

## 2022-02-16 PROCEDURE — 83001 ASSAY OF GONADOTROPIN (FSH): CPT | Performed by: INTERNAL MEDICINE

## 2022-02-16 PROCEDURE — 84146 ASSAY OF PROLACTIN: CPT | Performed by: INTERNAL MEDICINE

## 2022-02-16 PROCEDURE — 83002 ASSAY OF GONADOTROPIN (LH): CPT | Performed by: INTERNAL MEDICINE

## 2022-02-23 ENCOUNTER — OFFICE VISIT (OUTPATIENT)
Dept: ENDOCRINOLOGY | Facility: CLINIC | Age: 82
End: 2022-02-23
Payer: MEDICARE

## 2022-02-23 VITALS
DIASTOLIC BLOOD PRESSURE: 60 MMHG | SYSTOLIC BLOOD PRESSURE: 128 MMHG | OXYGEN SATURATION: 99 % | HEIGHT: 66 IN | WEIGHT: 169.81 LBS | HEART RATE: 88 BPM | BODY MASS INDEX: 27.29 KG/M2

## 2022-02-23 DIAGNOSIS — E22.1 HYPERPROLACTINEMIA: ICD-10-CM

## 2022-02-23 DIAGNOSIS — E03.9 HYPOTHYROIDISM, UNSPECIFIED TYPE: ICD-10-CM

## 2022-02-23 DIAGNOSIS — E27.49 SECONDARY ADRENAL INSUFFICIENCY: Primary | ICD-10-CM

## 2022-02-23 PROCEDURE — 99214 OFFICE O/P EST MOD 30 MIN: CPT | Mod: S$PBB,,, | Performed by: INTERNAL MEDICINE

## 2022-02-23 PROCEDURE — 99999 PR PBB SHADOW E&M-EST. PATIENT-LVL V: ICD-10-PCS | Mod: PBBFAC,,, | Performed by: INTERNAL MEDICINE

## 2022-02-23 PROCEDURE — 99215 OFFICE O/P EST HI 40 MIN: CPT | Mod: PBBFAC,PO | Performed by: INTERNAL MEDICINE

## 2022-02-23 PROCEDURE — 99999 PR PBB SHADOW E&M-EST. PATIENT-LVL V: CPT | Mod: PBBFAC,,, | Performed by: INTERNAL MEDICINE

## 2022-02-23 PROCEDURE — 99214 PR OFFICE/OUTPT VISIT, EST, LEVL IV, 30-39 MIN: ICD-10-PCS | Mod: S$PBB,,, | Performed by: INTERNAL MEDICINE

## 2022-02-23 NOTE — PROGRESS NOTES
CHIEF COMPLAINT: Hypothyroid/AI   81 y.o.  being seen as a f/u Hypothyroid since late 40's.  On synthroid 50 mcg. On hydrocortisone 10/5. S/P thrombotic stroke 6/18/21. Still some impact on vision in right eye. No Galactorrhea. Takes LT4 by itself. No HA. No N/V. No Fatigue. Her  has been ill and in the hospital. Not wearing medic alert bracelet still. States has not gotten int.          PAST MEDICAL HISTORY/PAST SURGICAL HISTORY:  Reviewed in [facility]    SOCIAL HISTORY: No T/A    FAMILY HISTORY:  Daughter has hyperthyroidism. No DM    MEDICATIONS/ALLERGIES: The patient's MedCard has been updated and reviewed.      ROS:   Constitutional: weight stable.   Cardiovascular:No CP  Respiratory: Seeing cardiology for SOB.   Remainder ROS negative        PE:    GENERAL: Well developed, well nourished.  NECK: Supple, trachea midline, No palpable thyroid nodules  CHEST: Resp even and unlabored, CTA bilateral.  CARDIAC: RRR, S1, S2 heard, no murmurs, rubs, S3, or S4    COSYNTROPIN STIM TEST:   Ref. Range 9/19/2019 08:16 9/19/2019 08:50 9/19/2019 09:20   Cortisol Latest Units: ug/dL 2.00 7.70 9.70      Latest Reference Range & Units 02/16/22 10:42   Sodium 136 - 145 mmol/L 134 (L)   Potassium 3.5 - 5.1 mmol/L 3.9   Chloride 95 - 110 mmol/L 98   CO2 23 - 29 mmol/L 25   Anion Gap 8 - 16 mmol/L 11   BUN 8 - 23 mg/dL 24 (H)   Creatinine 0.5 - 1.4 mg/dL 0.9   EGFR if non African American >60 mL/min/1.73 m^2 >60.0 [1]   EGFR if African American >60 mL/min/1.73 m^2 >60.0   Glucose 70 - 110 mg/dL 79   Calcium 8.7 - 10.5 mg/dL 9.2   Alkaline Phosphatase 55 - 135 U/L 79   PROTEIN TOTAL 6.0 - 8.4 g/dL 6.4   Albumin 3.5 - 5.2 g/dL 3.6   BILIRUBIN TOTAL 0.1 - 1.0 mg/dL 0.5 [2]   AST 10 - 40 U/L 29   ALT 10 - 44 U/L 28   TSH 0.400 - 4.000 uIU/mL 4.137 (H)   Free T4 0.71 - 1.51 ng/dL 0.74   FSH See Text mIU/mL 84.62 [3]   LH See Text mIU/mL 28.9 [4]   Prolactin 5.2 - 26.5 ng/mL 47.3 (H)   (    ASSESSMENT/PLAN:  1. Adrenal  Insufficiency-  Appears to have secondary AI. Undetectable ACTH. Possible 2 mm lesion seen on MRI. However that may be incidental finding and less likely to be the cause of AI. Discussed sick day precautions and wearing medic alert bracelet again. Put websites for medic alert bracelet on AVS. Continue current dose of steroids.     2. Hyponatremia- Na currently WNL. Continue to monitor.     3. Hypothyroid- TSH range is higher as one ages. Current TSH at an acceptable range. Continue current tx,..     4. Hyperprolactinemia- FSH and LH not suppressed. Unsure if related to 2 mm lesion seen on MRI. No galactorrhea. Can continue to monitor.     FOLLOWUP  F/U 6 months with CMP, TSH, Prolactin

## 2022-02-23 NOTE — PATIENT INSTRUCTIONS
Medic Alert Bracelet Websites  Also available on [employer]    [email or web address]    [email or web address]

## 2022-05-30 PROBLEM — Z86.16 HISTORY OF 2019 NOVEL CORONAVIRUS DISEASE (COVID-19): Status: ACTIVE | Noted: 2022-05-30

## 2022-08-18 ENCOUNTER — LAB VISIT (OUTPATIENT)
Dept: LAB | Facility: HOSPITAL | Age: 82
End: 2022-08-18
Attending: INTERNAL MEDICINE
Payer: MEDICARE

## 2022-08-18 DIAGNOSIS — E22.1 HYPERPROLACTINEMIA: ICD-10-CM

## 2022-08-18 DIAGNOSIS — E03.9 HYPOTHYROIDISM, UNSPECIFIED TYPE: ICD-10-CM

## 2022-08-18 DIAGNOSIS — E27.49 SECONDARY ADRENAL INSUFFICIENCY: ICD-10-CM

## 2022-08-18 LAB
ALBUMIN SERPL BCP-MCNC: 4 G/DL (ref 3.5–5.2)
ALP SERPL-CCNC: 104 U/L (ref 55–135)
ALT SERPL W/O P-5'-P-CCNC: 22 U/L (ref 10–44)
ANION GAP SERPL CALC-SCNC: 12 MMOL/L (ref 8–16)
AST SERPL-CCNC: 23 U/L (ref 10–40)
BILIRUB SERPL-MCNC: 0.4 MG/DL (ref 0.1–1)
BUN SERPL-MCNC: 19 MG/DL (ref 8–23)
CALCIUM SERPL-MCNC: 9.7 MG/DL (ref 8.7–10.5)
CHLORIDE SERPL-SCNC: 101 MMOL/L (ref 95–110)
CO2 SERPL-SCNC: 26 MMOL/L (ref 23–29)
CREAT SERPL-MCNC: 0.7 MG/DL (ref 0.5–1.4)
EST. GFR  (NO RACE VARIABLE): >60 ML/MIN/1.73 M^2
GLUCOSE SERPL-MCNC: 110 MG/DL (ref 70–110)
POTASSIUM SERPL-SCNC: 3.6 MMOL/L (ref 3.5–5.1)
PROLACTIN SERPL IA-MCNC: 11.8 NG/ML (ref 5.2–26.5)
PROT SERPL-MCNC: 7.3 G/DL (ref 6–8.4)
SODIUM SERPL-SCNC: 139 MMOL/L (ref 136–145)
TSH SERPL DL<=0.005 MIU/L-ACNC: 0.46 UIU/ML (ref 0.4–4)

## 2022-08-18 PROCEDURE — 36415 COLL VENOUS BLD VENIPUNCTURE: CPT | Mod: PO | Performed by: INTERNAL MEDICINE

## 2022-08-18 PROCEDURE — 80053 COMPREHEN METABOLIC PANEL: CPT | Performed by: INTERNAL MEDICINE

## 2022-08-18 PROCEDURE — 84443 ASSAY THYROID STIM HORMONE: CPT | Performed by: INTERNAL MEDICINE

## 2022-08-18 PROCEDURE — 84146 ASSAY OF PROLACTIN: CPT | Performed by: INTERNAL MEDICINE

## 2022-08-24 ENCOUNTER — OFFICE VISIT (OUTPATIENT)
Dept: ENDOCRINOLOGY | Facility: CLINIC | Age: 82
End: 2022-08-24
Payer: MEDICARE

## 2022-08-24 VITALS
SYSTOLIC BLOOD PRESSURE: 130 MMHG | WEIGHT: 177.5 LBS | HEART RATE: 64 BPM | OXYGEN SATURATION: 95 % | DIASTOLIC BLOOD PRESSURE: 68 MMHG | HEIGHT: 66 IN | BODY MASS INDEX: 28.53 KG/M2

## 2022-08-24 DIAGNOSIS — E27.49 SECONDARY ADRENAL INSUFFICIENCY: ICD-10-CM

## 2022-08-24 DIAGNOSIS — E22.1 HYPERPROLACTINEMIA: Primary | ICD-10-CM

## 2022-08-24 DIAGNOSIS — E03.9 HYPOTHYROIDISM, UNSPECIFIED TYPE: ICD-10-CM

## 2022-08-24 PROCEDURE — 99999 PR PBB SHADOW E&M-EST. PATIENT-LVL III: ICD-10-PCS | Mod: PBBFAC,,, | Performed by: INTERNAL MEDICINE

## 2022-08-24 PROCEDURE — 99999 PR PBB SHADOW E&M-EST. PATIENT-LVL III: CPT | Mod: PBBFAC,,, | Performed by: INTERNAL MEDICINE

## 2022-08-24 PROCEDURE — 99214 OFFICE O/P EST MOD 30 MIN: CPT | Mod: S$PBB,,, | Performed by: INTERNAL MEDICINE

## 2022-08-24 PROCEDURE — 99214 PR OFFICE/OUTPT VISIT, EST, LEVL IV, 30-39 MIN: ICD-10-PCS | Mod: S$PBB,,, | Performed by: INTERNAL MEDICINE

## 2022-08-24 PROCEDURE — 99213 OFFICE O/P EST LOW 20 MIN: CPT | Mod: PBBFAC,PO | Performed by: INTERNAL MEDICINE

## 2022-08-24 NOTE — PROGRESS NOTES
CHIEF COMPLAINT: Hypothyroid/AI   81 y.o.  being seen as a f/u Hypothyroid since late 40's.  On synthroid 50 mcg. On hydrocortisone 10/5. S/P thrombotic stroke 6/18/21. Still some impact on vision in right eye. No Galactorrhea. Takes LT4 by itself. No HA. No N/V. No Fatigue. No Palpitations. No tremors. No anxiety. Has some insomnia.  Takes 2nd dose of Hydrocortisone in evening. Still has not gotten Medic Alert bracelet. Has not had to use sick day precautions.     PAST MEDICAL HISTORY/PAST SURGICAL HISTORY:  Reviewed in [facility]    SOCIAL HISTORY: No T/A    FAMILY HISTORY:  Daughter has hyperthyroidism. No DM    MEDICATIONS/ALLERGIES: The patient's MedCard has been updated and reviewed.        PE:    GENERAL: Well developed, well nourished.  NECK: Supple, trachea midline, No palpable thyroid nodules  CHEST: Resp even and unlabored, CTA bilateral.  CARDIAC: RRR, S1, S2 heard, no murmurs, rubs, S3, or S4    COSYNTROPIN STIM TEST:   Ref. Range 9/19/2019 08:16 9/19/2019 08:50 9/19/2019 09:20   Cortisol Latest Units: ug/dL 2.00 7.70 9.70      Latest Reference Range & Units 08/18/22 11:56   Sodium 136 - 145 mmol/L 139   Potassium 3.5 - 5.1 mmol/L 3.6   Chloride 95 - 110 mmol/L 101   CO2 23 - 29 mmol/L 26   Anion Gap 8 - 16 mmol/L 12   BUN 8 - 23 mg/dL 19   Creatinine 0.5 - 1.4 mg/dL 0.7   eGFR >60 mL/min/1.73 m^2 >60.0   Glucose 70 - 110 mg/dL 110   Calcium 8.7 - 10.5 mg/dL 9.7   Alkaline Phosphatase 55 - 135 U/L 104   PROTEIN TOTAL 6.0 - 8.4 g/dL 7.3   Albumin 3.5 - 5.2 g/dL 4.0   BILIRUBIN TOTAL 0.1 - 1.0 mg/dL 0.4   AST 10 - 40 U/L 23   ALT 10 - 44 U/L 22   TSH 0.400 - 4.000 uIU/mL 0.461   Prolactin 5.2 - 26.5 ng/mL 11.8           ASSESSMENT/PLAN:  1. 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 Undetectable ACTH. Possible 2 mm lesion seen on MRI. However that may be incidental finding and unlikely to be the cause of Adrenal Insufficiency as would not expect a decrease in hormonal production.. Discussed sick day  precautions and wearing medic alert bracelet again.  Medical alert bracelet has been discussed several times..  Discussed taking her afternoon dose of steroids earlier in the day to see if that helps with insomnia.    2. Hyponatremia- Na currently WNL. Continue to monitor.     3. Hypothyroid- TSH range is higher as one ages. Current TSH decreased from before. will check in 6 months if still at lower and or develops symptoms will decrease dose..  For now Continue current tx,..     4. Hyperprolactinemia- FSH and LH not suppressed. Unsure if related to 2 mm lesion seen on MRI. No galactorrhea. Prolactin now WNL. Can continue to monitor.     FOLLOWUP- moving the [place]  6 months with TSH  F/U 1 yr with CMP, TSH, Prolactin

## 2022-11-14 ENCOUNTER — TELEPHONE (OUTPATIENT)
Dept: ENDOCRINOLOGY | Facility: CLINIC | Age: 82
End: 2022-11-14
Payer: MEDICARE

## 2022-11-17 ENCOUNTER — TELEPHONE (OUTPATIENT)
Dept: ENDOCRINOLOGY | Facility: CLINIC | Age: 82
End: 2022-11-17
Payer: MEDICARE

## 2022-12-12 DIAGNOSIS — E03.9 HYPOTHYROIDISM, UNSPECIFIED TYPE: ICD-10-CM

## 2022-12-12 RX ORDER — HYDROCORTISONE 5 MG/1
TABLET ORAL
Qty: 90 TABLET | Refills: 11 | Status: SHIPPED | OUTPATIENT
Start: 2022-12-12

## 2022-12-12 RX ORDER — LEVOTHYROXINE SODIUM 50 UG/1
50 TABLET ORAL
Qty: 90 TABLET | Refills: 3 | Status: SHIPPED | OUTPATIENT
Start: 2022-12-12 | End: 2023-12-12

## 2023-01-05 ENCOUNTER — HOSPITAL ENCOUNTER (EMERGENCY)
Facility: HOSPITAL | Age: 83
Discharge: HOME | End: 2023-01-06
Attending: STUDENT IN AN ORGANIZED HEALTH CARE EDUCATION/TRAINING PROGRAM
Payer: MEDICARE

## 2023-01-05 DIAGNOSIS — S81.811A LACERATION OF RIGHT LOWER EXTREMITY, INITIAL ENCOUNTER: Primary | ICD-10-CM

## 2023-01-05 DIAGNOSIS — T14.90XA TRAUMA: ICD-10-CM

## 2023-01-05 PROCEDURE — 82962 GLUCOSE BLOOD TEST: CPT

## 2023-01-05 PROCEDURE — 63600175 PHARM REV CODE 636 W HCPCS: Performed by: PHYSICIAN ASSISTANT

## 2023-01-05 PROCEDURE — 90715 TDAP VACCINE 7 YRS/> IM: CPT | Performed by: PHYSICIAN ASSISTANT

## 2023-01-05 PROCEDURE — 25000003 PHARM REV CODE 250: Performed by: PHYSICIAN ASSISTANT

## 2023-01-05 PROCEDURE — 99285 EMERGENCY DEPT VISIT HI MDM: CPT | Mod: 25

## 2023-01-05 PROCEDURE — 90471 IMMUNIZATION ADMIN: CPT | Performed by: PHYSICIAN ASSISTANT

## 2023-01-05 RX ORDER — LIDOCAINE HYDROCHLORIDE 10 MG/ML
10 INJECTION, SOLUTION EPIDURAL; INFILTRATION; INTRACAUDAL; PERINEURAL
Status: COMPLETED | OUTPATIENT
Start: 2023-01-05 | End: 2023-01-05

## 2023-01-05 RX ADMIN — TETANUS TOXOID, REDUCED DIPHTHERIA TOXOID AND ACELLULAR PERTUSSIS VACCINE, ADSORBED 0.5 ML: 5; 2.5; 8; 8; 2.5 SUSPENSION INTRAMUSCULAR at 07:01

## 2023-01-05 RX ADMIN — LIDOCAINE HYDROCHLORIDE 100 MG: 10 INJECTION, SOLUTION EPIDURAL; INFILTRATION; INTRACAUDAL; PERINEURAL at 05:01

## 2023-01-05 NOTE — FIRST PROVIDER EVALUATION
" Emergency Department TeleTriage Encounter Note      CHIEF COMPLAINT    Chief Complaint   Patient presents with    Laceration     Slip and fell. Large laceration with soft tissue exposure noted. Bleeding controlled on arrival. Not UTD on tetanus       VITAL SIGNS   Initial Vitals [01/05/23 1543]   BP Pulse Resp Temp SpO2   123/63 68 15 98 °F (36.7 °C) 98 %      MAP       --            ALLERGIES    Review of patient's allergies indicates:   Allergen Reactions    Azithromycin Diarrhea    Cefdinir     Ceftriaxone     Clarithromycin Diarrhea    Codeine      Other reaction(s): makes "sick"    Hydralazine analogues      Increased urinary frequency    Hydrochlorothiazide      Significantly worsens her hyponatremia    Levofloxacin Diarrhea    Moxifloxacin Diarrhea    Sulfamethoxazole-trimethoprim      Other reaction(s): diarrhea    Iodinated contrast media      Other reaction(s): Decreased blood pressure       PROVIDER TRIAGE NOTE  Patient presents with complaint of laceration to leg. She reports a fall causing a laceration. Tetanus not UTD      Phy:   Constitutional: well nourished, well developed, appearing stated age, NAD   HEENT: NCAT, symmetrical lids, No obvious facial deformity.  Normal phonation. Normal Conjunctiva   Neck: NAROM   Respiratory: Normal effort.  No obvious use of accessory muscles   Musculoskeletal: Moved upper extremities well   Neuro: Alert, answers questions appropriately    Psych: appropriate mood and affect      Initial orders will be placed and care will be transferred to an alternate provider when patient is roomed for a full evaluation. Any additional orders and the final disposition will be determined by that provider.        ORDERS  Labs Reviewed - No data to display    ED Orders (720h ago, onward)      None              Virtual Visit Note: The provider triage portion of this emergency department evaluation and documentation was performed via [employer], a HIPAA-compliant telemedicine " application, in concert with a tele-presenter in the room. A face to face patient evaluation with one of my colleagues will occur once the patient is placed in an emergency department room.      DISCLAIMER: This note was prepared with [employer] voice recognition transcription software. Garbled syntax, mangled pronouns, and other bizarre constructions may be attributed to that software system.

## 2023-01-06 VITALS
HEART RATE: 74 BPM | OXYGEN SATURATION: 99 % | TEMPERATURE: 99 F | SYSTOLIC BLOOD PRESSURE: 118 MMHG | DIASTOLIC BLOOD PRESSURE: 64 MMHG | RESPIRATION RATE: 18 BRPM

## 2023-01-06 LAB — POCT GLUCOSE: 82 MG/DL (ref 70–110)

## 2023-01-06 PROCEDURE — 12004 RPR S/N/AX/GEN/TRK7.6-12.5CM: CPT

## 2023-01-06 NOTE — ED NOTES
"Pt presents with  c/o 9 out of 10 pain to her right frontal/temporal region of forehead and 7 out 10 pain to her anterior lower leg secondary to 7cm jagged laceration. Pt denies LOC, nausea or vomiting. 3cm circular hematoma/contusion noted above temporal region, bleeding controlled on arrival. Tetanus status unknown. Pt states she tripped on a "doggy pad". Pt is AAOx4. Son is at bedside. RLE bandage clean and cry.  "

## 2023-01-06 NOTE — DISCHARGE INSTRUCTIONS
Please return to have your sutures removed in 7-10 days at an Urgent Care, primary care physician's office or the emergency department.  Return for any red flags of wound care that we discussed.  Come back to the ER any time for any new or worsening.

## 2023-01-06 NOTE — ED PROVIDER NOTES
"Encounter Date: 1/5/2023       History     Chief Complaint   Patient presents with    Laceration     82 y.o.f. c/o 9 out of 10 pain to her right frontal/temporal region and 7 out 10 pain to her anterior lower leg secondary to 7cm jagged laceration. Pt denies LOC, nausea or vomiting. 3cm circular hematoma/contusion noted above temporal region, bleeding controlled on arrival. Tetanus status unknown.      82-year-old female who says that she was ambulating in her home when she tripped over her own feet and her right leg and face.  No loss of consciousness, nausea or vomiting or seizure-like activity.  The patient does take Plavix for coronary artery disease.  She right leg which has remained concern.  She has a headache which is mild and she mainly complains of the right leg pain.  No large amount of blood loss at the scene or here.  No numbness or tingling.  She is able to walk it is on the leg without difficulty.  No dysarthria, dysphagia, numbness unilateral weakness.  She said that she had no preceding symptoms and she simply tripped which resulted in the fall.    Review of patient's allergies indicates:   Allergen Reactions    Azithromycin Diarrhea    Cefdinir     Ceftriaxone     Clarithromycin Diarrhea    Codeine      Other reaction(s): makes "sick"    Hydralazine analogues      Increased urinary frequency    Hydrochlorothiazide      Significantly worsens her hyponatremia    Levofloxacin Diarrhea    Moxifloxacin Diarrhea    Sulfamethoxazole-trimethoprim      Other reaction(s): diarrhea    Iodinated contrast media      Other reaction(s): Decreased blood pressure     Past Medical History:   Diagnosis Date    a Bilateral Carotid Artery Stenosis ###    Dr. Ezra Tanner    a CAD With H/O Stenting     Dr. Ezra Tanner; 10/2020 C/Angiogram (Dr. SHANTAL Tanner) = (Report Scanned)    a Cardiac Diastolic Dysfunction     Dr. Ezra Tanner; 4/29/18 Echo = (See Report)    a Chronic Anticoagulation With Plavix     For 06/2021 " "Post-CVA Protection    b Hypertension     8/4/16 Changed Norvasc 5 Mg Daily To Hydralazine 10 Mg Bid; 5/14/16 D/Cd Benicar-HCTZ (HCTZ Decreased Her Na+)    b SIADH With Chronic Hyponatremia #####    5/28/16 Referred To Dr. Dov Rasmussen But She Never Went (She Had Been Seeing Dr. Yazan Nunes's Partner); HCTZ Significantly Worsenes Her Hyponatremia    c Homocystinemia     c Hypercholesterolemia With High HDL     5/17/17 Increased Lipitor To 80 Mg qHS With Repeat Labs In 6 Weeks    c Mild Hypertriglyceridemia     e Hypothyroidism     5/17/17 Increased 25 Mcg Levothyroxine To 50 Mcg qAM With Repeat TFTs In 4 Months    f Chronic Adrenal Insufficiency #####    Dr. Lior Naidu; On Hydrocortisone (Cortef)  10 Mg qAM And 5 Mg qPM    f Obesity     g Factor VIII Disorder ###    Dr. Blaine Garcia On 9/14/16 Ordered A Lab W/U For Spontaneous Ecchymosis    g Spontaneous Ecchymosis ###    Dr. Blaine Garcia On 9/14/16 Ordered A Lab W/U For Spontaneous Ecchymosis    i Chronic Mild ROMERO     i H/O COVID-19 Infection     Her 1/6/22 COVID-19 Swab Test = Was Positive    i H/O Rib Fractures In 06/2020     j Chronic Diarrhea     Dr. HARLEY Choe    j H/O Colon Polyps On 10/11/16 TC ###    Dr. HARLEY Choe: "Repeat TC In 3 YRs"    j Hepatic Steatosis     Dr. HARLEY Choe; 7/8/16 Bilateral Renal U/S = Fatty Liver Changes But Otherwise Normal    k Low Female Testosterone And DHEA Levels     Dr. Lior Naidu    k Overactive Bladder     On Toviaz 8 Mg Daily    k Recurrent UTIs     7/8/16 Bilateral Renal U/S = Fatty Liver Changes But Otherwise Normal    l H/O 2 Lumbar Compression Fractures In 2017     l H/O Right Foot Surgery     Dr. John Fonseca In 2016    l Lumbar Spinal DDD     l Right 3rd Finger Arthropathy With Edema     Dr. Claudio Mahmood On 09/2016 Referred Her To Dr. Blaine Garcia For Spontaneous Ecchymosis    m Bilateral Lower Extremity Peripheral Sensory Neuropathy     8/4/16 Restarted Lyrica 75 Mg With BMP In 2 " Weeks (To Check Na+ Level)    m Chronic Fatigue     m H/O Cerebrovascular Accident In 2021     [facility] 21-21 Stay For This: With Right Hemianopsia Sequelae    m Recurrent Headaches     n Grief Reaction 2022    On 22 Her  (Mg Mascorro, Who Was Also My Patient)  With CHF And Valvulopathy    o Allergic Rhinosinusitis     Dr. Beto Samuels; Dr. Jose werner Right Orbital Pain     After A Fall On 20    o Tongue papillae hypertrophy     q Bilateral Lower Extremity Varicose Veins     q Chronic Bilateral Lower Extremity Edema     q Vitamin D deficiency     Wellness Visit 21      Past Surgical History:   Procedure Laterality Date    ANGIOGRAPHY OF KIDNEY N/A 2018    Procedure: Angiogram Renal Bilateral Selective;  Surgeon: Ezra Tanner MD;  Location: [facility] CATH;  Service: Cardiology;  Laterality: N/A;    CARDIAC SURGERY  3yrs ago    CAROTID ENDARTERECTOMY Right 2018    CATARACT EXTRACTION      x 2    CORONARY ANGIOGRAPHY N/A 2018    Procedure: Coronary angiography;  Surgeon: Ezra Tanner MD;  Location: [facility] CATH;  Service: Cardiology;  Laterality: N/A;    CORONARY ANGIOPLASTY WITH STENT PLACEMENT  10/2011    FOOT SURGERY      HYSTERECTOMY      TONSILLECTOMY      TOTAL ABDOMINAL HYSTERECTOMY W/ BILATERAL SALPINGOOPHORECTOMY      TOTAL HIP ARTHROPLASTY Left 9 yrs ago     Family History   Problem Relation Age of Onset    Heart disease Father     Cancer Mother         ovarian    Hypertension Mother     Ovarian cancer Mother 85    Cancer Sister         ovarian    Ovarian cancer Sister 68    Hypertension Sister     Thyroid disease Daughter     Hyperlipidemia Sister      Social History     Tobacco Use    Smoking status: Never    Smokeless tobacco: Never   Substance Use Topics    Alcohol use: No    Drug use: No     Review of Systems   All other systems reviewed and are negative.    Physical Exam     Initial Vitals [23 1543]   BP Pulse Resp Temp SpO2    123/63 68 15 98 °F (36.7 °C) 98 %      MAP       --         Physical Exam    Nursing note and vitals reviewed.  Constitutional: She appears well-developed and well-nourished.   HENT:   Head: Normocephalic.   Hematoma to the right temporal scalp no active bleeding.   Eyes: EOM are normal. Pupils are equal, round, and reactive to light.   Neck: Neck supple. No JVD present.   Normal range of motion.  Cardiovascular:  Normal rate and regular rhythm.           Pulmonary/Chest: Breath sounds normal. No stridor. No respiratory distress.   Abdominal: Abdomen is soft. There is no abdominal tenderness.   Musculoskeletal:         General: No edema. Normal range of motion.      Cervical back: Normal range of motion and neck supple.     Neurological: She is alert and oriented to person, place, and time. GCS score is 15. GCS eye subscore is 4. GCS verbal subscore is 5. GCS motor subscore is 6.   Skin: Skin is warm and dry. Capillary refill takes less than 2 seconds.   There is a 6 cm linear laceration to the anterior fat is exposed is no tendon involvement.  This area is nontender.  No arterial Pumper.  Carb apartment are soft.  Dorsalis pedis pulses all intact.  She has full motor and sensation distally.  He was ambulating prior to arrival and is able to do so here.   Psychiatric: She has a normal mood and affect. Thought content normal.       ED Course   Lac Repair    Date/Time: 1/6/2023 12:50 AM  Performed by: Mike Nolen MD  Authorized by: Mike Nolen MD     Consent:     Consent obtained:  Verbal    Consent given by:  Patient    Risks discussed:  Infection, need for additional repair, nerve damage, poor wound healing, poor cosmetic result, pain, retained foreign body, tendon damage and vascular damage    Alternatives discussed:  No treatment  Laceration details:     Location:  Leg    Leg location:  R lower leg    Length (cm):  8    Depth (mm):  4  Pre-procedure details:     Preparation:  Patient was prepped and  draped in usual sterile fashion and imaging obtained to evaluate for foreign bodies  Exploration:     Imaging obtained: x-ray      Imaging outcome: foreign body not noted      Wound exploration: wound explored through full range of motion and entire depth of wound visualized      Contaminated: no    Treatment:     Area cleansed with:  Saline    Amount of cleaning:  Extensive    Irrigation solution:  Sterile saline    Visualized foreign bodies/material removed: no      Debridement:  None    Undermining:  None    Scar revision: no    Skin repair:     Repair method:  Sutures    Suture size:  3-0    Suture material:  Nylon    Suture technique:  Simple interrupted    Number of sutures:  22  Approximation:     Approximation:  Close  Repair type:     Repair type:  Simple  Post-procedure details:     Dressing:  Adhesive bandage and bulky dressing    Procedure completion:  Tolerated well, no immediate complications  Labs Reviewed - No data to display       Imaging Results              CT Cervical Spine Without Contrast (Final result)  Result time 01/05/23 23:43:29      Final result by Dameon Hubbard MD (01/05/23 23:43:29)                   Impression:      Chronic changes of the cervical spine are noted, with progression compared to the prior study, correlation for any specific level of symptomatology is needed.    On close evaluation of available imaging, there is no evidence for acute cervical spine fracture deformity.      Electronically signed by: Dameon Hubbard  Date:    01/05/2023  Time:    23:43               Narrative:    EXAMINATION:  CT CERVICAL SPINE WITHOUT CONTRAST    CLINICAL HISTORY:  Ataxia, cervical trauma;    TECHNIQUE:  Low dose axial images, sagittal and coronal reformations were performed though the cervical spine.  Contrast was not administered.    COMPARISON:  CT examination of the cervical spine Munira 10, 2020    FINDINGS:  Chronic changes of the cervical spine are noted.  There is diminished  mineralization and there is degenerative change.  There is straightening of the cervical spine.  There is mild grade 1 anterolisthesis of C3 with respect to C4, C4 with respect to C5, C5 with respect to C6.  There is multilevel chronic endplate change and loss of disc space height most notable with fusion at C6-7.  Multilevel facet arthropathy is noted and multilevel facet fusion is noted.  There is prominent facet arthropathy seen on the right at C3-4 including subarticular cystic appearing change, with progression compared to the prior exam.  There is no evidence for acute facet fracture or facet dislocation.  The occipital condyles articulate appropriately with the superior articular facets of C1 at the craniocervical junction.    There is chronic change seen at the C1-2 level, with progression compared to the prior study.  Multilevel chronic change of the cervical spine is noted, with progression compared to the prior examination however there is no large focal disc protrusion or high-grade spinal canal stenosis.    On close evaluation of available imaging, there is no evidence for acute cervical spine fracture deformity.    Limited imaging of the lung apices demonstrates appearance of chronic parenchymal and pleural change.                                       CT Head Without Contrast (Final result)  Result time 01/05/23 23:21:20      Final result by Dameon Hubbard MD (01/05/23 23:21:20)                   Impression:      Chronic change noted, there is no evidence for superimposed acute intracranial process.      Electronically signed by: Dameon Hubbard  Date:    01/05/2023  Time:    23:21               Narrative:    EXAMINATION:  CT HEAD WITHOUT CONTRAST    CLINICAL HISTORY:  Ataxia, head trauma;    TECHNIQUE:  Low dose axial images were obtained through the head.  Coronal and sagittal reformations were also performed. Contrast was not administered.    COMPARISON:  CT examination of the brain June 18,  2021, MRI examination of the brain, June 18, 2021    FINDINGS:  There is an area of gliosis and encephalomalacia involving the left occipital lobe consistent with an area of remote ischemia/infarct corresponding to the finding on the prior examination.  The ventricular system, sulcal pattern and parenchymal attenuation characteristics are consistent with chronic change.  Involutional change noted, chronic appearing white matter change noted.  There is no evidence for superimposed acute intracranial process.    There is no evidence for intracranial mass, mass effect or midline shift and there is no evidence for acute intracranial hemorrhage.  Appropriate CSF spaces are seen at the skull base.    The orbits demonstrate chronic change.  The mastoid air cells appear appropriate when accounting for averaging.  Minimal paranasal sinus mucosal thickening is noted.  The osseous structures appear intact.                                       X-Ray Tibia Fibula 2 View Right (Final result)  Result time 01/05/23 23:43:12      Final result by Ori Merida MD (01/05/23 23:43:12)                   Impression:      Osteopenia and degenerative changes with no acute osseous abnormality in the right leg.    A overlying bandage and hemostasis clip suggesting previous vascular surgery.  A no soft tissue gas or soft tissue swelling is evident a mid venous stasis calcifications.      Electronically signed by: Ori Merida  Date:    01/05/2023  Time:    23:43               Narrative:    EXAMINATION:  XR TIBIA FIBULA 2 VIEW RIGHT    CLINICAL HISTORY:  Injury, unspecified, initial encounter    TECHNIQUE:  AP and lateral views of the right tibia and fibula were performed.    COMPARISON:  None.    FINDINGS:  Osteopenia is noted.  Orthopedic hardware is seen in the right midfoot no fracture or bony destructive process is evident.  Venous stasis phleboliths are noted.  Vascular hemostasis clips are noted throughout the knee and leg.   Overlying bandage is noted.                                       Medications   LIDOcaine (PF) 10 mg/ml (1%) injection 100 mg (has no administration in time range)   Tdap (BOOSTRIX) vaccine injection 0.5 mL (0.5 mLs Intramuscular Given 1/5/23 1913)     Medical Decision Making:   Initial Assessment:   Hemodynamically stable.  Afebrile.  Phonating well phonating well protecting airway spontaneously.  Examination as above will obtain x-rays of the right tib-fib to assess for fracture in addition to a CT scan of the head and neck.  Patient becomes increasingly argumentative with this writer about the wait times in the emergency department.  I apologized and said that we had limited availability and her during her very best-she continues to become verbally aggressive asking staff to go faster.  Charge nurse was made aware of the patient and she was immediately roomed in plan for imaging and suture repair pending imaging studies.           ED Course as of 01/06/23 0051   Thu Jan 05, 2023   2347 Imaging reviewed.  [BG]      ED Course User Index  [BG] Mike Nolen MD             Laceration repaired performed by me. Red flags of wound care discussed. Will discharge home.     Clinical Impression:   Final diagnoses:  [T14.90XA] Trauma  [S81.811A] Laceration of right lower extremity, initial encounter (Primary)        ED Disposition Condition    Discharge Stable          ED Prescriptions    None       Follow-up Information       Follow up With Specialties Details Why Contact Info    Josr Gandhi MD Internal Medicine Go to  As needed [address] [place] [address]  [phone]               Mike Nolen MD  01/06/23 0052

## 2023-04-10 ENCOUNTER — TELEPHONE (OUTPATIENT)
Dept: ENDOCRINOLOGY | Facility: CLINIC | Age: 83
End: 2023-04-10
Payer: MEDICARE

## 2023-08-11 NOTE — PROGRESS NOTES
Subjective:      Patient ID: Ela Mascorro is a 82 y.o. female.    Chief Complaint:  No chief complaint on file.    History of Present Illness  Ela Mascorro presents today for follow up of hypothyroidism, AI, osteoporosis, and pituitary adenoma. Previous patient of Dr Naidu. Last seen in Aug 2022. This is her first visit with me.     Since her last visit, she has moved to the [facility]. She is accompanied by her friend Kavya.     With regards to her hypothyroidism:    Family history of thyroid disease: mother     Currentmedication:  Generic LT4 50 mcg daily  Has been on this dose for years     Takes thyroid medication on an empty stomach and waits 30-45 minutes before eating drinking or taking other medications  Missed doses: denies     current symptoms:     [x] weight gain  [x] Fatigue  [] Constipation           [] Hair loss  [] Brittle nails   [] Mental fog [] Chest pain, palpations, or sob   [x]  Heat intolerance  [] Denies complaints    Denies selenium   Denies Biotin usage      Wt Readings from Last 3 Encounters:   08/15/23 1425 83.6 kg (184 lb 3.1 oz)   08/24/22 1336 80.5 kg (177 lb 7.5 oz)   02/23/22 0942 77 kg (169 lb 12.8 oz)     Lab Results   Component Value Date    TSH 0.461 08/18/2022    FREET4 0.74 02/16/2022     With regards to osteoporosis:     Family history: none  Menopause at age 40s    current medication: fosamax   Start date: Aug 2023    Calcium intake - 600 mg twice daily   Vit D intake- in MVI and calcium     Weight bearing exercise- using wheelchair today; walking with walker   Recent falls- fall around Jan 2023.    They have made fall precautions in house   Needs hip replacement     Dental work- had extraction and implant >6 months ago    No recent fractures -denies   + significant height loss (>2 inches)    tob use +   etoh use-denies     No current diarrhea or h/o malabsorption    + chronic exposure to steroid therapy,   - anticoagulants, proton pump inhibitors or antiseizure  medications.     Denies GERD, indigestion, or gastric bypass surgery.     + history of thyroid disease,   Denies anemia, kidney stones or kidney disease.     Denies active malignancy, history of malignancy the involved the bone, prior radiation treatment, or unexplained elevations of alk phos on labs     Last BMD dated.  DXA 6/26/23   FINDINGS:  Bone mineral density measurements were obtained in the lumbar spine and the right femoral neck.     The bone mineral density in the lumbar spine (L1-L4) is 1.15 gm/cm2, 110% of the young adult mean, T score 0.9.       The bone mineral density in the right femoral neck is 0.54 gm/cm2, 64% of the young adult mean, T score -2.7.     FRAX software was utilized to calculate the 10 year risk of fracture. The 10-year probability of a major osteoporotic fracture is 20% and the 10-year probability of a hip fracture is 7.6%.    Ela Mascorro was found to have adrenal insufficiency by below  She is on HC 10/5 mg daily    COSYNTROPIN STIM TEST:    Ref. Range 9/19/2019 08:16 9/19/2019 08:50 9/19/2019 09:20   Cortisol Latest Units: ug/dL 2.00 7.70 9.70   Determined secondary    Secondary symptoms:  - hypoglycemia symptoms- sweating, anxiety, nausea, & palpitations   + occ headaches  + visual field loss but due to stroke in June 2021    No history of adrenal crisis    Has a medic alert tag  Has an emergent Dexamethasone prefilled injection pen    Adrenal Labs Latest Ref Rng & Units 11/12/2021 2/16/2022 8/18/2022   Cortisol ug/dL - - -   ACTH 0 - 46 pg/mL - - -   Aldosterone, Serum ng/dL - - -   Renin, Serum ng/mL/hr - - -   WBC 3.90 - 12.70 K/uL 8.83 - -   RBC 4.00 - 5.40 M/uL 4.15 - -   Hemoglobin 12.0 - 16.0 g/dL 10.5(L) - -   Hematocrit 37.0 - 48.5 % 32.8(L) - -   MCV 82 - 98 fL 79(L) - -   MCH 27.0 - 31.0 pg 25.3(L) - -   MCHC 32.0 - 36.0 g/dL 32.0 - -   RDW 11.5 - 14.5 % 16.0(H) - -   Platelets 150 - 450 K/uL 313 - -   MPV 9.2 - 12.9 fL 8.8(L) - -   Gran # 1.8 - 7.7 K/uL 5.9 - -    Lymph # 1.0 - 4.8 K/uL 1.5 - -   Mono # 0.3 - 1.0 K/uL 0.7 - -   Eos # 0.0 - 0.5 K/uL 0.7(H) - -   Baso # 0.00 - 0.20 K/uL 0.06 - -   Gran % 38.0 - 73.0 % 66.3 - -   Lymph % 18.0 - 48.0 % 16.8(L) - -   Mono % 4.0 - 15.0 % 7.7 - -   Eos % 0.0 - 8.0 % 7.9 - -   Baso % 0.0 - 1.9 % 0.7 - -   Sodium 136 - 145 mmol/L 136 134(L) 139   Potassium 3.5 - 5.1 mmol/L 4.2 3.9 3.6   Chloride 95 - 110 mmol/L 103 98 101   Carbon Dioxide 23 - 29 mmol/L 26 25 26   Glucose 70 - 110 mg/dL 87 79 110   Blood Urea Nitrogen 8 - 23 mg/dL 20(H) 24(H) 19   Creatinine 0.5 - 1.4 mg/dL 0.68 0.9 0.7   Calcium 8.7 - 10.5 mg/dL 9.4 9.2 9.7   Total Protein 6.0 - 8.4 g/dL 6.2 6.4 7.3   Albumin 3.5 - 5.2 g/dL 3.7 3.6 4.0   Total Bilirubin 0.1 - 1.0 mg/dL 0.2 0.5 0.4   AST 10 - 40 U/L 31 29 23   ALT 10 - 44 U/L 35 28 22   Anion Gap 8 - 16 mmol/L 7(L) 11 12   eGFR (African American) >60 mL/min/1.73 m:2 >60 >60.0 -   eGFR (Non-African American) >60 mL/min/1.73 m:2 >60 >60.0 -   DHEA 0.630 - 4.700 ng/mL - - -   Prothrombin Time 9.0 - 12.5 sec - - -   INR 0.8 - 1.2 - - -   aPTT 21.0 - 32.0 sec - - -     With regards to elevated prolactin now normalized,      Latest Reference Range & Units 06/04/20 13:59 05/18/21 10:35 02/16/22 10:42 08/18/22 11:56   Prolactin 5.2 - 26.5 ng/mL 34.2 (H) 55.3 (H) 47.3 (H) 11.8   (H): Data is abnormally high    No galactorrhea   No IGF1 done  No change in ring or shoe size  + sweating     MRI 5/18/2021   Impression:   1. There is no acute abnormality or change in the appearance of the brain or pituitary gland compared to the prior studies.  There is volume loss with very mild nonspecific white matter change.  There is no parenchymal hemorrhage, mass, acute infarction or abnormal enhancement in the brain.  2. There is a stable tiny 1.9 mm focus of relative decreased enhancement in the posterior aspect of the pituitary gland on the left which could represent a tiny stable cystic adenoma or Rathke's cleft cyst.    Review of  "Systems  As above    Objective:   Physical Exam  Constitutional:       Appearance: She is obese.   Neck:      Thyroid: No thyromegaly.   Pulmonary:      Effort: Pulmonary effort is normal.   Musculoskeletal:      Comments: In wheelchair today   Neurological:      Mental Status: She is alert.         Visit Vitals  /62   Pulse 61   Ht 5' 3" (1.6 m)   Wt 83.6 kg (184 lb 3.1 oz)   SpO2 98%   BMI 32.63 kg/m²        Lab Review:   Lab Results   Component Value Date    HGBA1C 5.6 06/19/2021    HGBA1C 6.0 (H) 01/16/2021    HGBA1C 6.3 (H) 08/11/2020      Lab Results   Component Value Date    CHOL 149 06/18/2021    HDL 37 (L) 06/18/2021    LDLCALC 78.4 06/18/2021    TRIG 168 (H) 06/18/2021    CHOLHDL 24.8 06/18/2021     Lab Results   Component Value Date     08/18/2022    K 3.6 08/18/2022     08/18/2022    CO2 26 08/18/2022     08/18/2022    BUN 19 08/18/2022    CREATININE 0.7 08/18/2022    CALCIUM 9.7 08/18/2022    PROT 7.3 08/18/2022    ALBUMIN 4.0 08/18/2022    BILITOT 0.4 08/18/2022    ALKPHOS 104 08/18/2022    AST 23 08/18/2022    ALT 22 08/18/2022    ANIONGAP 12 08/18/2022    ESTGFRAFRICA >60.0 02/16/2022    EGFRNONAA >60.0 02/16/2022    TSH 0.461 08/18/2022     Vit D, 25-Hydroxy   Date Value Ref Range Status   05/25/2018 46 30 - 96 ng/mL Final     Comment:     Vitamin D deficiency.........<10 ng/mL                              Vitamin D insufficiency......10-29 ng/mL       Vitamin D sufficiency........> or equal to 30 ng/mL  Vitamin D toxicity............>100 ng/mL       Assessment and Plan     1. Congenital hypothyroidism without goiter  TSH    T4, Free      2. Low Female Testosterone And DHEA Levels        3. Benign neoplasm of pituitary gland  Prolactin    Insulin-Like Growth Factor      4. Adrenal insufficiency        5. SIADH With Chronic Hyponatremia  Comprehensive Metabolic Panel      6. Vitamin D deficiency        7. Pituitary adenoma        8. Age-related osteoporosis without current " pathological fracture          Hypothyroidism  Dx prior to pit adenoma in 2021  Last TSH lower end of normal over one year ago and she having heat intolerance   Continue Levothyroxine 50 mcg daily for now thyroid   Check TSH   Goal TSH around 4    Low Female Testosterone And DHEA Levels  FSH and LH not suppressed.   Unsure if related to 2 mm lesion seen on MRI.   No galactorrhea.    Pituitary adenoma  Prolactin now normal. 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 Undetectable ACTH. Possible 2 mm lesion seen on MRI. However that may be incidental finding and unlikely to be the cause of Adrenal Insufficiency as would not expect a decrease in hormonal production.   Discussed sick day precautions and wearing medic alert bracelet again.    Medical alert bracelet has been discussed several times     SIADH With Chronic Hyponatremia  Na currently WNL. Continue to monitor.     Vitamin D deficiency  Continue current supplementation    Age-related osteoporosis without current pathological fracture  Seen on DXA in June 2023  Had extraction some time this year.   Started on fosamax per PCP   Will ask her to hold fosamax if <6 months since extraction and restart once >6 months   Continue vitamin D   Encouraged exercise as tolerated  RDA of calcium is 4710-1248 mg daily, preferably from food. Given list of calcium in foods and encouraged to decrease supplementation if on enough in diet  Avoid falls    Follow up in about 6 months (around 2/15/2024).

## 2023-08-15 ENCOUNTER — OFFICE VISIT (OUTPATIENT)
Dept: ENDOCRINOLOGY | Facility: CLINIC | Age: 83
End: 2023-08-15
Payer: MEDICARE

## 2023-08-15 ENCOUNTER — LAB VISIT (OUTPATIENT)
Dept: LAB | Facility: HOSPITAL | Age: 83
End: 2023-08-15
Payer: MEDICARE

## 2023-08-15 VITALS
HEIGHT: 63 IN | DIASTOLIC BLOOD PRESSURE: 62 MMHG | WEIGHT: 184.19 LBS | OXYGEN SATURATION: 98 % | SYSTOLIC BLOOD PRESSURE: 128 MMHG | BODY MASS INDEX: 32.64 KG/M2 | HEART RATE: 61 BPM

## 2023-08-15 DIAGNOSIS — E87.1 CHRONIC HYPONATREMIA: ICD-10-CM

## 2023-08-15 DIAGNOSIS — M81.0 AGE-RELATED OSTEOPOROSIS WITHOUT CURRENT PATHOLOGICAL FRACTURE: ICD-10-CM

## 2023-08-15 DIAGNOSIS — D35.2 BENIGN NEOPLASM OF PITUITARY GLAND: ICD-10-CM

## 2023-08-15 DIAGNOSIS — R79.89 LOW TESTOSTERONE LEVEL IN FEMALE: ICD-10-CM

## 2023-08-15 DIAGNOSIS — E03.1 CONGENITAL HYPOTHYROIDISM WITHOUT GOITER: ICD-10-CM

## 2023-08-15 DIAGNOSIS — E55.9 VITAMIN D DEFICIENCY: ICD-10-CM

## 2023-08-15 DIAGNOSIS — D35.2 PITUITARY ADENOMA: ICD-10-CM

## 2023-08-15 DIAGNOSIS — E27.40 ADRENAL INSUFFICIENCY: ICD-10-CM

## 2023-08-15 LAB
ALBUMIN SERPL BCP-MCNC: 3.6 G/DL (ref 3.5–5.2)
ALP SERPL-CCNC: 83 U/L (ref 55–135)
ALT SERPL W/O P-5'-P-CCNC: 24 U/L (ref 10–44)
ANION GAP SERPL CALC-SCNC: 11 MMOL/L (ref 8–16)
AST SERPL-CCNC: 17 U/L (ref 10–40)
BILIRUB SERPL-MCNC: 0.4 MG/DL (ref 0.1–1)
BUN SERPL-MCNC: 15 MG/DL (ref 8–23)
CALCIUM SERPL-MCNC: 9.2 MG/DL (ref 8.7–10.5)
CHLORIDE SERPL-SCNC: 102 MMOL/L (ref 95–110)
CO2 SERPL-SCNC: 26 MMOL/L (ref 23–29)
CREAT SERPL-MCNC: 0.8 MG/DL (ref 0.5–1.4)
EST. GFR  (NO RACE VARIABLE): >60 ML/MIN/1.73 M^2
GLUCOSE SERPL-MCNC: 97 MG/DL (ref 70–110)
POTASSIUM SERPL-SCNC: 3.9 MMOL/L (ref 3.5–5.1)
PROLACTIN SERPL IA-MCNC: 19.2 NG/ML (ref 5.2–26.5)
PROT SERPL-MCNC: 6.6 G/DL (ref 6–8.4)
SODIUM SERPL-SCNC: 139 MMOL/L (ref 136–145)
T4 FREE SERPL-MCNC: 0.88 NG/DL (ref 0.71–1.51)
TSH SERPL DL<=0.005 MIU/L-ACNC: 0.97 UIU/ML (ref 0.4–4)

## 2023-08-15 PROCEDURE — 99214 PR OFFICE/OUTPT VISIT, EST, LEVL IV, 30-39 MIN: ICD-10-PCS | Mod: S$PBB,,, | Performed by: NURSE PRACTITIONER

## 2023-08-15 PROCEDURE — 99999 PR PBB SHADOW E&M-EST. PATIENT-LVL III: CPT | Mod: PBBFAC,,, | Performed by: NURSE PRACTITIONER

## 2023-08-15 PROCEDURE — 84443 ASSAY THYROID STIM HORMONE: CPT | Performed by: NURSE PRACTITIONER

## 2023-08-15 PROCEDURE — 99999 PR PBB SHADOW E&M-EST. PATIENT-LVL III: ICD-10-PCS | Mod: PBBFAC,,, | Performed by: NURSE PRACTITIONER

## 2023-08-15 PROCEDURE — 80053 COMPREHEN METABOLIC PANEL: CPT | Performed by: NURSE PRACTITIONER

## 2023-08-15 PROCEDURE — 36415 COLL VENOUS BLD VENIPUNCTURE: CPT | Performed by: NURSE PRACTITIONER

## 2023-08-15 PROCEDURE — 99213 OFFICE O/P EST LOW 20 MIN: CPT | Mod: PBBFAC | Performed by: NURSE PRACTITIONER

## 2023-08-15 PROCEDURE — 84439 ASSAY OF FREE THYROXINE: CPT | Performed by: NURSE PRACTITIONER

## 2023-08-15 PROCEDURE — 84146 ASSAY OF PROLACTIN: CPT | Performed by: NURSE PRACTITIONER

## 2023-08-15 PROCEDURE — 99214 OFFICE O/P EST MOD 30 MIN: CPT | Mod: S$PBB,,, | Performed by: NURSE PRACTITIONER

## 2023-08-15 RX ORDER — ALENDRONATE SODIUM 70 MG/1
70 TABLET ORAL
COMMUNITY
Start: 2023-07-16

## 2023-08-15 NOTE — ASSESSMENT & PLAN NOTE
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 Undetectable ACTH. Possible 2 mm lesion seen on MRI. However that may be incidental finding and unlikely to be the cause of Adrenal Insufficiency as would not expect a decrease in hormonal production.   Discussed sick day precautions and wearing medic alert bracelet again.    Medical alert bracelet has been discussed several times

## 2023-08-15 NOTE — ASSESSMENT & PLAN NOTE
Seen on DXA in June 2023  Had extraction some time this year.   Started on fosamax per PCP   Will ask her to hold fosamax if <6 months since extraction and restart once >6 months   Continue vitamin D   Encouraged exercise as tolerated  RDA of calcium is 2356-7628 mg daily, preferably from food. Given list of calcium in foods and encouraged to decrease supplementation if on enough in diet  Avoid falls

## 2023-08-15 NOTE — PATIENT INSTRUCTIONS
Check prolactin   Continue HC 10/5 mg daily         Thyroid Hormone    Last TSH lower end of normal and she having heat intolerance    Continue Levothyroxine 50 mcg daily for now thyroid    Check TSH     Osteoporosis    Continue fosamax if it has been >6 months since extraction. If not, hold fosamax until > 6 months   Continue vitamin D   Continue calcium but recommended daily calcium is 0041-3221 mg daily, preferably from diet     Estimated Calcium Content of Foods:  Produce  Serving Size Estimated Calcium*    [place] greens, frozen 8 oz 360 mg   Broccoli lisha 8 oz 200 mg   Kale, frozen 8 oz 180 mg   Soy Beans, green, boiled 8 oz 175 mg   Bok Aretha, cooked, boiled 8 oz 160 mg   Figs, dried 2 figs 65 mg   Broccoli, fresh, cooked 8 oz 60 mg   Oranges 1 whole 55 mg   Seafood Serving Size Estimated Calcium*    Sardines, canned with bones 3 oz 325 mg   [place], canned with bones 3 oz 180 mg   Shrimp, canned 3 oz 125 mg   Dairy Serving Size Estimated Calcium*    Ricotta, part-skim 4 oz 335 mg   Yogurt, plain, low-fat 6 oz 310 mg   Milk, skim, low-fat, whole 8 oz 300 mg   Yogurt with fruit, low-fat 6 oz 260 mg   Mozzarella, part-skim 1 oz 210 mg   Cheddar 1 oz 205 mg   Yogurt, Greek 6 oz 200 mg   American Cheese 1 oz 195 mg   Feta Cheese 4 oz 140 mg   Cottage Cheese, 2% 4 oz 105 mg   Frozen yogurt, vanilla 8 oz 105 mg   Ice Cream, vanilla 8 oz 85 mg   Parmesan 1 tbsp 55 mg   Fortified Food Serving Size Estimated Calcium*   [place] milk, rice milk or soy milk, fortified 8 oz 300 mg   Orange juice and other fruit juices, fortified 8 oz 300 mg   Tofu, prepared with calcium 4 oz 205 mg   Waffle, frozen, fortified 2 pieces 200 mg   Oatmeal, fortified 1 packet 140 mg   English muffin, fortified 1 muffin 100 mg   Cereal, fortified 8 oz 100-1,000 mg   Other Serving Size Estimated Calcium*   Mac & cheese, frozen 1 package 325 mg   Pizza, cheese, frozen 1 serving 115 mg   Pudding, chocolate, prepared with 2% milk 4 oz 160 mg   Beans,  "baked, canned 4 oz 160 mg   *The calcium content listed for most foods is estimated and can vary due to multiple factors. Check the food label to determine how much calcium is in a particular product.  If you read the nutrition label for a food source, it lists the % calcium in that food.  For an 8 oz glass of milk, for example, the label states calcium 30%.  This is equivalent to 300 mg of calcium (multiply the listed number by 10).   **Table from the National [employer]opor[employer] Foundation    Adrenal insufficiency self-care instructions and sick day rules     Adrenal insufficiency, which involves a deficiency in steroid hormones that are normally produced by the body, can result in symptoms that include generalized and severe fatigue, malaise, dizziness, and low blood pressure. Should you develop any of these symptoms, please call us immediately or go to the ER if severe symptoms develop.    Please refer to the following instructions for patients with adrenal insufficiency:  1. Please get an alert bracelet that says "Adrenal insufficiency. Give stress doses of steroids in case of illness."     2. If you become nauseous and are unable to keep hydrocortisone down, you need to go to an emergency room to get this medication via IV.     3. If you are ill with a low-grade fever of  F, please double your dose of hydrocortisone. If you have a fever of >100 F, please triple your hydrocortisone dose for 3 days or until fever resolves.     4. If you are undergoing a planned medical procedure (such as minor surgery, colonoscopy) or a major dental procedure (tooth extraction, root canal etc) you should double your dose the day before and the day of and the day after the procedure.    5. If a major surgery requiring general anesthesia is being planned, please notify your endocrinologist so that we can give instructions to the anesthesia team that will be taking care of you with regards to the amount of hydrocortisone to be given " during surgery.    Please call us with any questions and to notify us that you are ill at home or are heading to ER/hospital so that we can help you through the situation and communicate with the physicians taking care of you.

## 2023-08-15 NOTE — ASSESSMENT & PLAN NOTE
Dx prior to pit adenoma in 2021  Last TSH lower end of normal over one year ago and she having heat intolerance   Continue Levothyroxine 50 mcg daily for now thyroid   Check TSH   Goal TSH around 4

## 2023-08-18 LAB
IGF-I SERPL-MCNC: 159 NG/ML (ref 34–177)
IGF-I Z-SCORE SERPL: 1.65 SD

## 2023-08-21 DIAGNOSIS — E55.9 VITAMIN D DEFICIENCY: Primary | ICD-10-CM

## 2023-08-21 DIAGNOSIS — E03.9 HYPOTHYROIDISM, UNSPECIFIED TYPE: ICD-10-CM

## 2023-08-21 DIAGNOSIS — D35.2 PITUITARY ADENOMA: ICD-10-CM

## 2024-06-24 ENCOUNTER — HOSPITAL ENCOUNTER (INPATIENT)
Facility: HOSPITAL | Age: 84
LOS: 2 days | Discharge: NURSING, REHAB OR LONG-TERM CARE | DRG: 689 | End: 2024-06-26
Attending: EMERGENCY MEDICINE | Admitting: INTERNAL MEDICINE
Payer: MEDICARE

## 2024-06-24 DIAGNOSIS — N30.00 ACUTE CYSTITIS WITHOUT HEMATURIA: Primary | ICD-10-CM

## 2024-06-24 DIAGNOSIS — R06.02 SHORTNESS OF BREATH: ICD-10-CM

## 2024-06-24 DIAGNOSIS — R00.0 TACHYCARDIA: ICD-10-CM

## 2024-06-24 DIAGNOSIS — G93.40 ENCEPHALOPATHY: ICD-10-CM

## 2024-06-24 PROBLEM — G93.41 ACUTE METABOLIC ENCEPHALOPATHY: Status: ACTIVE | Noted: 2024-06-24

## 2024-06-24 PROBLEM — Z86.19 HISTORY OF INFECTION OF INTESTINE DUE TO CLOSTRIDIOIDES DIFFICILE: Status: ACTIVE | Noted: 2024-06-24

## 2024-06-24 LAB
ALBUMIN SERPL BCP-MCNC: 2.7 G/DL (ref 3.5–5.2)
ALLENS TEST: ABNORMAL
ALP SERPL-CCNC: 165 U/L (ref 55–135)
ALT SERPL W/O P-5'-P-CCNC: 78 U/L (ref 10–44)
ANION GAP SERPL CALC-SCNC: 10 MMOL/L (ref 8–16)
AST SERPL-CCNC: 68 U/L (ref 10–40)
BACTERIA #/AREA URNS AUTO: ABNORMAL /HPF
BASOPHILS # BLD AUTO: 0.04 K/UL (ref 0–0.2)
BASOPHILS NFR BLD: 0.5 % (ref 0–1.9)
BILIRUB SERPL-MCNC: 0.4 MG/DL (ref 0.1–1)
BILIRUB UR QL STRIP: NEGATIVE
BNP SERPL-MCNC: 820 PG/ML (ref 0–99)
BUN SERPL-MCNC: 9 MG/DL (ref 8–23)
CALCIUM SERPL-MCNC: 8.4 MG/DL (ref 8.7–10.5)
CHLORIDE SERPL-SCNC: 106 MMOL/L (ref 95–110)
CLARITY UR REFRACT.AUTO: CLEAR
CO2 SERPL-SCNC: 21 MMOL/L (ref 23–29)
COLOR UR AUTO: COLORLESS
CREAT SERPL-MCNC: 0.7 MG/DL (ref 0.5–1.4)
D DIMER PPP IA.FEU-MCNC: 0.57 MG/L FEU
DIFFERENTIAL METHOD BLD: ABNORMAL
EOSINOPHIL # BLD AUTO: 0.3 K/UL (ref 0–0.5)
EOSINOPHIL NFR BLD: 4 % (ref 0–8)
ERYTHROCYTE [DISTWIDTH] IN BLOOD BY AUTOMATED COUNT: 17.8 % (ref 11.5–14.5)
EST. GFR  (NO RACE VARIABLE): >60 ML/MIN/1.73 M^2
GLUCOSE SERPL-MCNC: 74 MG/DL (ref 70–110)
GLUCOSE UR QL STRIP: NEGATIVE
HCO3 UR-SCNC: 27.9 MMOL/L (ref 24–28)
HCT VFR BLD AUTO: 38.7 % (ref 37–48.5)
HCV AB SERPL QL IA: NORMAL
HGB BLD-MCNC: 13 G/DL (ref 12–16)
HGB UR QL STRIP: NEGATIVE
HIV 1+2 AB+HIV1 P24 AG SERPL QL IA: NORMAL
IMM GRANULOCYTES # BLD AUTO: 0.03 K/UL (ref 0–0.04)
IMM GRANULOCYTES NFR BLD AUTO: 0.4 % (ref 0–0.5)
INFLUENZA A, MOLECULAR: NOT DETECTED
INFLUENZA B, MOLECULAR: NOT DETECTED
KETONES UR QL STRIP: NEGATIVE
LEUKOCYTE ESTERASE UR QL STRIP: ABNORMAL
LYMPHOCYTES # BLD AUTO: 2.3 K/UL (ref 1–4.8)
LYMPHOCYTES NFR BLD: 28.9 % (ref 18–48)
MAGNESIUM SERPL-MCNC: 2 MG/DL (ref 1.6–2.6)
MCH RBC QN AUTO: 32.7 PG (ref 27–31)
MCHC RBC AUTO-ENTMCNC: 33.6 G/DL (ref 32–36)
MCV RBC AUTO: 98 FL (ref 82–98)
MICROSCOPIC COMMENT: ABNORMAL
MONOCYTES # BLD AUTO: 0.8 K/UL (ref 0.3–1)
MONOCYTES NFR BLD: 10.1 % (ref 4–15)
NEUTROPHILS # BLD AUTO: 4.4 K/UL (ref 1.8–7.7)
NEUTROPHILS NFR BLD: 56.1 % (ref 38–73)
NITRITE UR QL STRIP: NEGATIVE
NRBC BLD-RTO: 0 /100 WBC
OHS QRS DURATION: 104 MS
OHS QTC CALCULATION: 463 MS
PCO2 BLDA: 48.4 MMHG (ref 35–45)
PH SMN: 7.37 [PH] (ref 7.35–7.45)
PH UR STRIP: 8 [PH] (ref 5–8)
PLATELET # BLD AUTO: 251 K/UL (ref 150–450)
PMV BLD AUTO: 10 FL (ref 9.2–12.9)
PO2 BLDA: 24 MMHG (ref 40–60)
POC BE: 3 MMOL/L
POC SATURATED O2: 39 % (ref 95–100)
POC TCO2: 29 MMOL/L (ref 24–29)
POTASSIUM SERPL-SCNC: 5.2 MMOL/L (ref 3.5–5.1)
PROT SERPL-MCNC: 5.4 G/DL (ref 6–8.4)
PROT UR QL STRIP: NEGATIVE
RBC # BLD AUTO: 3.97 M/UL (ref 4–5.4)
RBC #/AREA URNS AUTO: 2 /HPF (ref 0–4)
RSV AG BY MOLECULAR METHOD: NOT DETECTED
SAMPLE: ABNORMAL
SARS-COV-2 RNA RESP QL NAA+PROBE: NOT DETECTED
SITE: ABNORMAL
SODIUM SERPL-SCNC: 137 MMOL/L (ref 136–145)
SP GR UR STRIP: 1.01 (ref 1–1.03)
T4 FREE SERPL-MCNC: 0.83 NG/DL (ref 0.71–1.51)
TROPONIN I SERPL DL<=0.01 NG/ML-MCNC: <0.006 NG/ML (ref 0–0.03)
TSH SERPL DL<=0.005 MIU/L-ACNC: 4.24 UIU/ML (ref 0.4–4)
URN SPEC COLLECT METH UR: ABNORMAL
WBC # BLD AUTO: 7.79 K/UL (ref 3.9–12.7)
WBC #/AREA URNS AUTO: 46 /HPF (ref 0–5)
YEAST UR QL AUTO: ABNORMAL

## 2024-06-24 PROCEDURE — 85379 FIBRIN DEGRADATION QUANT: CPT | Performed by: EMERGENCY MEDICINE

## 2024-06-24 PROCEDURE — 86803 HEPATITIS C AB TEST: CPT | Performed by: PHYSICIAN ASSISTANT

## 2024-06-24 PROCEDURE — 84439 ASSAY OF FREE THYROXINE: CPT | Performed by: EMERGENCY MEDICINE

## 2024-06-24 PROCEDURE — 63600175 PHARM REV CODE 636 W HCPCS: Performed by: EMERGENCY MEDICINE

## 2024-06-24 PROCEDURE — 85025 COMPLETE CBC W/AUTO DIFF WBC: CPT | Performed by: EMERGENCY MEDICINE

## 2024-06-24 PROCEDURE — 83880 ASSAY OF NATRIURETIC PEPTIDE: CPT | Performed by: EMERGENCY MEDICINE

## 2024-06-24 PROCEDURE — 96365 THER/PROPH/DIAG IV INF INIT: CPT

## 2024-06-24 PROCEDURE — 87086 URINE CULTURE/COLONY COUNT: CPT | Performed by: EMERGENCY MEDICINE

## 2024-06-24 PROCEDURE — 25000003 PHARM REV CODE 250: Performed by: INTERNAL MEDICINE

## 2024-06-24 PROCEDURE — 84443 ASSAY THYROID STIM HORMONE: CPT | Performed by: EMERGENCY MEDICINE

## 2024-06-24 PROCEDURE — 0241U SARS-COV2 (COVID) WITH FLU/RSV BY PCR: CPT | Performed by: EMERGENCY MEDICINE

## 2024-06-24 PROCEDURE — 12000002 HC ACUTE/MED SURGE SEMI-PRIVATE ROOM

## 2024-06-24 PROCEDURE — 81001 URINALYSIS AUTO W/SCOPE: CPT | Performed by: EMERGENCY MEDICINE

## 2024-06-24 PROCEDURE — 83735 ASSAY OF MAGNESIUM: CPT | Performed by: EMERGENCY MEDICINE

## 2024-06-24 PROCEDURE — 63600175 PHARM REV CODE 636 W HCPCS: Performed by: INTERNAL MEDICINE

## 2024-06-24 PROCEDURE — 99285 EMERGENCY DEPT VISIT HI MDM: CPT | Mod: 25

## 2024-06-24 PROCEDURE — 82803 BLOOD GASES ANY COMBINATION: CPT

## 2024-06-24 PROCEDURE — 11000001 HC ACUTE MED/SURG PRIVATE ROOM

## 2024-06-24 PROCEDURE — 80053 COMPREHEN METABOLIC PANEL: CPT | Performed by: EMERGENCY MEDICINE

## 2024-06-24 PROCEDURE — 25000003 PHARM REV CODE 250: Performed by: EMERGENCY MEDICINE

## 2024-06-24 PROCEDURE — 87389 HIV-1 AG W/HIV-1&-2 AB AG IA: CPT | Performed by: PHYSICIAN ASSISTANT

## 2024-06-24 PROCEDURE — 84484 ASSAY OF TROPONIN QUANT: CPT | Performed by: EMERGENCY MEDICINE

## 2024-06-24 PROCEDURE — 93010 ELECTROCARDIOGRAM REPORT: CPT | Mod: ,,, | Performed by: INTERNAL MEDICINE

## 2024-06-24 PROCEDURE — 99900035 HC TECH TIME PER 15 MIN (STAT)

## 2024-06-24 PROCEDURE — 93005 ELECTROCARDIOGRAM TRACING: CPT

## 2024-06-24 RX ORDER — OLANZAPINE 2.5 MG/1
2.5 TABLET ORAL NIGHTLY
Status: ON HOLD | COMMUNITY
Start: 2024-04-03 | End: 2024-06-25 | Stop reason: HOSPADM

## 2024-06-24 RX ORDER — FUROSEMIDE 20 MG/1
20 TABLET ORAL DAILY
Status: ON HOLD | COMMUNITY
Start: 2024-05-11 | End: 2024-06-26 | Stop reason: HOSPADM

## 2024-06-24 RX ORDER — HYDROCORTISONE 5 MG/1
10 TABLET ORAL DAILY
Status: DISCONTINUED | OUTPATIENT
Start: 2024-06-25 | End: 2024-06-25

## 2024-06-24 RX ORDER — MONTELUKAST SODIUM 10 MG/1
10 TABLET ORAL NIGHTLY
Status: DISCONTINUED | OUTPATIENT
Start: 2024-06-24 | End: 2024-06-26 | Stop reason: HOSPADM

## 2024-06-24 RX ORDER — OLANZAPINE 2.5 MG/1
2.5 TABLET ORAL NIGHTLY
Status: DISCONTINUED | OUTPATIENT
Start: 2024-06-24 | End: 2024-06-24

## 2024-06-24 RX ORDER — LANOLIN ALCOHOL/MO/W.PET/CERES
1000 CREAM (GRAM) TOPICAL DAILY
Status: DISCONTINUED | OUTPATIENT
Start: 2024-06-25 | End: 2024-06-24

## 2024-06-24 RX ORDER — OLANZAPINE 2.5 MG/1
2.5 TABLET ORAL 2 TIMES DAILY PRN
Status: DISCONTINUED | OUTPATIENT
Start: 2024-06-24 | End: 2024-06-26 | Stop reason: HOSPADM

## 2024-06-24 RX ORDER — SODIUM CHLORIDE 0.9 % (FLUSH) 0.9 %
10 SYRINGE (ML) INJECTION EVERY 12 HOURS PRN
Status: DISCONTINUED | OUTPATIENT
Start: 2024-06-24 | End: 2024-06-26 | Stop reason: HOSPADM

## 2024-06-24 RX ORDER — BUPROPION HYDROCHLORIDE 150 MG/1
300 TABLET ORAL DAILY
Status: DISCONTINUED | OUTPATIENT
Start: 2024-06-25 | End: 2024-06-24

## 2024-06-24 RX ORDER — LIDOCAINE 50 MG/G
3 PATCH TOPICAL
Status: DISCONTINUED | OUTPATIENT
Start: 2024-06-24 | End: 2024-06-26 | Stop reason: HOSPADM

## 2024-06-24 RX ORDER — OLANZAPINE 10 MG/2ML
2.5 INJECTION, POWDER, FOR SOLUTION INTRAMUSCULAR ONCE AS NEEDED
Status: COMPLETED | OUTPATIENT
Start: 2024-06-24 | End: 2024-06-24

## 2024-06-24 RX ORDER — TALC
6 POWDER (GRAM) TOPICAL NIGHTLY PRN
Status: DISCONTINUED | OUTPATIENT
Start: 2024-06-24 | End: 2024-06-26 | Stop reason: HOSPADM

## 2024-06-24 RX ORDER — MIDODRINE HYDROCHLORIDE 10 MG/1
10 TABLET ORAL DAILY
COMMUNITY
Start: 2024-01-10 | End: 2024-06-24

## 2024-06-24 RX ORDER — ATORVASTATIN CALCIUM 40 MG/1
80 TABLET, FILM COATED ORAL DAILY
Status: DISCONTINUED | OUTPATIENT
Start: 2024-06-25 | End: 2024-06-26 | Stop reason: HOSPADM

## 2024-06-24 RX ORDER — NITROGLYCERIN 0.4 MG/1
0.4 TABLET SUBLINGUAL EVERY 5 MIN PRN
Status: ON HOLD | COMMUNITY
End: 2024-06-25 | Stop reason: HOSPADM

## 2024-06-24 RX ORDER — LEVOTHYROXINE SODIUM 50 UG/1
50 TABLET ORAL
Status: DISCONTINUED | OUTPATIENT
Start: 2024-06-25 | End: 2024-06-26 | Stop reason: HOSPADM

## 2024-06-24 RX ORDER — ROSUVASTATIN CALCIUM 20 MG/1
20 TABLET, COATED ORAL NIGHTLY
COMMUNITY
Start: 2024-04-05

## 2024-06-24 RX ORDER — LOSARTAN POTASSIUM 100 MG/1
100 TABLET ORAL DAILY
COMMUNITY
Start: 2024-05-20

## 2024-06-24 RX ORDER — ACETAMINOPHEN 325 MG/1
650 TABLET ORAL EVERY 4 HOURS PRN
Status: DISCONTINUED | OUTPATIENT
Start: 2024-06-24 | End: 2024-06-26 | Stop reason: HOSPADM

## 2024-06-24 RX ORDER — HYDROCORTISONE 5 MG/1
5 TABLET ORAL NIGHTLY
Status: DISCONTINUED | OUTPATIENT
Start: 2024-06-24 | End: 2024-06-25

## 2024-06-24 RX ORDER — ONDANSETRON 4 MG/1
4 TABLET, FILM COATED ORAL EVERY 8 HOURS PRN
Status: DISCONTINUED | OUTPATIENT
Start: 2024-06-24 | End: 2024-06-26 | Stop reason: HOSPADM

## 2024-06-24 RX ORDER — AMLODIPINE BESYLATE 5 MG/1
5 TABLET ORAL DAILY
Status: DISCONTINUED | OUTPATIENT
Start: 2024-06-24 | End: 2024-06-26 | Stop reason: HOSPADM

## 2024-06-24 RX ORDER — NALOXONE HCL 0.4 MG/ML
0.02 VIAL (ML) INJECTION
Status: DISCONTINUED | OUTPATIENT
Start: 2024-06-24 | End: 2024-06-26 | Stop reason: HOSPADM

## 2024-06-24 RX ORDER — BUPROPION HYDROCHLORIDE 300 MG/1
300 TABLET ORAL DAILY
Status: ON HOLD | COMMUNITY
Start: 2024-05-17 | End: 2024-06-25

## 2024-06-24 RX ORDER — BUPROPION HYDROCHLORIDE 150 MG/1
150 TABLET ORAL DAILY
Status: DISCONTINUED | OUTPATIENT
Start: 2024-06-25 | End: 2024-06-26 | Stop reason: HOSPADM

## 2024-06-24 RX ORDER — LOSARTAN POTASSIUM 50 MG/1
100 TABLET ORAL DAILY
Status: DISCONTINUED | OUTPATIENT
Start: 2024-06-25 | End: 2024-06-26

## 2024-06-24 RX ORDER — ONDANSETRON 4 MG/1
4 TABLET, FILM COATED ORAL EVERY 8 HOURS PRN
Status: ON HOLD | COMMUNITY
Start: 2024-02-26 | End: 2024-06-25 | Stop reason: HOSPADM

## 2024-06-24 RX ORDER — ACETAMINOPHEN 325 MG/1
650 TABLET ORAL 3 TIMES DAILY
Status: DISCONTINUED | OUTPATIENT
Start: 2024-06-24 | End: 2024-06-26 | Stop reason: HOSPADM

## 2024-06-24 RX ORDER — MULTIVITAMIN
1 TABLET ORAL DAILY
COMMUNITY
Start: 2024-02-23 | End: 2025-02-22

## 2024-06-24 RX ORDER — AMOXICILLIN 250 MG
1 CAPSULE ORAL 2 TIMES DAILY
Status: DISCONTINUED | OUTPATIENT
Start: 2024-06-24 | End: 2024-06-24

## 2024-06-24 RX ORDER — CLOPIDOGREL BISULFATE 75 MG/1
75 TABLET ORAL DAILY
Status: DISCONTINUED | OUTPATIENT
Start: 2024-06-24 | End: 2024-06-26 | Stop reason: HOSPADM

## 2024-06-24 RX ORDER — ENOXAPARIN SODIUM 100 MG/ML
40 INJECTION SUBCUTANEOUS EVERY 24 HOURS
Status: DISCONTINUED | OUTPATIENT
Start: 2024-06-24 | End: 2024-06-26 | Stop reason: HOSPADM

## 2024-06-24 RX ORDER — ESCITALOPRAM OXALATE 10 MG/1
20 TABLET ORAL DAILY
Status: DISCONTINUED | OUTPATIENT
Start: 2024-06-25 | End: 2024-06-26 | Stop reason: HOSPADM

## 2024-06-24 RX ORDER — PANTOPRAZOLE SODIUM 40 MG/1
40 TABLET, DELAYED RELEASE ORAL DAILY
Status: DISCONTINUED | OUTPATIENT
Start: 2024-06-25 | End: 2024-06-26 | Stop reason: HOSPADM

## 2024-06-24 RX ADMIN — ENOXAPARIN SODIUM 40 MG: 40 INJECTION SUBCUTANEOUS at 07:06

## 2024-06-24 RX ADMIN — PIPERACILLIN SODIUM AND TAZOBACTAM SODIUM 4.5 G: 4; .5 INJECTION, POWDER, FOR SOLUTION INTRAVENOUS at 09:06

## 2024-06-24 RX ADMIN — MONTELUKAST 10 MG: 10 TABLET, FILM COATED ORAL at 09:06

## 2024-06-24 RX ADMIN — AMLODIPINE BESYLATE 5 MG: 5 TABLET ORAL at 05:06

## 2024-06-24 RX ADMIN — HYDROCORTISONE 5 MG: 5 TABLET ORAL at 10:06

## 2024-06-24 RX ADMIN — Medication 1 CAPSULE: at 10:06

## 2024-06-24 RX ADMIN — LIDOCAINE 5% 3 PATCH: 700 PATCH TOPICAL at 07:06

## 2024-06-24 RX ADMIN — VANCOMYCIN HYDROCHLORIDE 125 MG: KIT at 10:06

## 2024-06-24 RX ADMIN — SODIUM CHLORIDE 1000 ML: 9 INJECTION, SOLUTION INTRAVENOUS at 02:06

## 2024-06-24 RX ADMIN — PIPERACILLIN SODIUM AND TAZOBACTAM SODIUM 4.5 G: 4; .5 INJECTION, POWDER, FOR SOLUTION INTRAVENOUS at 01:06

## 2024-06-24 RX ADMIN — OLANZAPINE 2.5 MG: 10 INJECTION, POWDER, FOR SOLUTION INTRAMUSCULAR at 03:06

## 2024-06-24 RX ADMIN — ACETAMINOPHEN 650 MG: 325 TABLET ORAL at 09:06

## 2024-06-24 RX ADMIN — CLOPIDOGREL BISULFATE 75 MG: 75 TABLET ORAL at 05:06
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
## 2024-06-24 NOTE — ED NOTES
Patient identifiers verified and correct for Ela Mascorro.  LOC: The patient is awake and alert with an appropriate affect, the patient is oriented x 2 and speaking appropriately. Able to identify herself and give some medical hx. Pt unaware of environment.  APPEARANCE: Patient appears comfortable and in no acute distress, patient is clean and well groomed.  SKIN: Pt has several wounds on legs. Pt came in wearing diaper. Diaper removed. Bottom and pubic region red and irritated.  MUSCULOSKELETAL: Patient moving all extremities spontaneously. Pain in right hip. Pt's joint in right leg make cracking sound with movement. Pt reports this as her norm.  RESPIRATORY: Airway is open and patent, respirations are spontaneous, patient has a normal effort and rate, no accessory muscle use noted, pt placed on continuous pulse ox with O2 sats noted at 97% on 2L NC.  CARDIAC: Pt placed on cardiac monitor. Patient has a normal rate and regular rhythm, no edema noted, capillary refill < 3 seconds.   GASTRO: Soft and non tender to palpation, no distention noted, normoactive bowel sounds present in all four quadrants. Pt states bowel movements have been regular. Pt had bowel movement today.  : Pt reports frequency. Pt has had recent UTIs.  NEURO: Pt opens eyes spontaneously, behavior appropriate to situation, follows commands, facial expression symmetrical, bilateral hand grasp equal and even, purposeful motor response noted, normal sensation in all extremities when touched with a finger. Pt having intermittent auditory and visual hallucinations.

## 2024-06-24 NOTE — ED TRIAGE NOTES
Patient presents to the ED via EMS from [facility] with complaints of hypoxia and altered mental status. Patient oriented to self only at this time, GCS 14. Rehab unit states pt has been having visual hallucinations, staff reports seeing people in her room.

## 2024-06-24 NOTE — ED NOTES
Pt becoming agitated. Pt removed IV. Charge notified of pt's need for safety sitter upon admission.

## 2024-06-25 PROBLEM — I50.32 CHRONIC CONGESTIVE HEART FAILURE WITH LEFT VENTRICULAR DIASTOLIC DYSFUNCTION: Status: ACTIVE | Noted: 2024-06-25

## 2024-06-25 PROBLEM — M15.9 DJD (DEGENERATIVE JOINT DISEASE), MULTIPLE SITES: Status: ACTIVE | Noted: 2024-06-25

## 2024-06-25 PROBLEM — I67.9 CEREBROVASCULAR DISEASE: Status: ACTIVE | Noted: 2024-04-25

## 2024-06-25 PROBLEM — R53.81 DEBILITY: Status: ACTIVE | Noted: 2024-06-12

## 2024-06-25 LAB
25(OH)D3+25(OH)D2 SERPL-MCNC: 40 NG/ML (ref 30–96)
ALBUMIN SERPL BCP-MCNC: 2.4 G/DL (ref 3.5–5.2)
ANION GAP SERPL CALC-SCNC: 10 MMOL/L (ref 8–16)
BACTERIA UR CULT: NORMAL
BACTERIA UR CULT: NORMAL
BASOPHILS # BLD AUTO: 0.1 K/UL (ref 0–0.2)
BASOPHILS NFR BLD: 1.4 % (ref 0–1.9)
BUN SERPL-MCNC: 10 MG/DL (ref 8–23)
CALCIUM SERPL-MCNC: 8.8 MG/DL (ref 8.7–10.5)
CHLORIDE SERPL-SCNC: 109 MMOL/L (ref 95–110)
CO2 SERPL-SCNC: 22 MMOL/L (ref 23–29)
CREAT SERPL-MCNC: 0.7 MG/DL (ref 0.5–1.4)
DIFFERENTIAL METHOD BLD: ABNORMAL
EOSINOPHIL # BLD AUTO: 0.4 K/UL (ref 0–0.5)
EOSINOPHIL NFR BLD: 5.9 % (ref 0–8)
ERYTHROCYTE [DISTWIDTH] IN BLOOD BY AUTOMATED COUNT: 17.9 % (ref 11.5–14.5)
EST. GFR  (NO RACE VARIABLE): >60 ML/MIN/1.73 M^2
GLUCOSE SERPL-MCNC: 61 MG/DL (ref 70–110)
HCT VFR BLD AUTO: 37.5 % (ref 37–48.5)
HGB BLD-MCNC: 11.7 G/DL (ref 12–16)
IMM GRANULOCYTES # BLD AUTO: 0.04 K/UL (ref 0–0.04)
IMM GRANULOCYTES NFR BLD AUTO: 0.6 % (ref 0–0.5)
LYMPHOCYTES # BLD AUTO: 2.6 K/UL (ref 1–4.8)
LYMPHOCYTES NFR BLD: 35.5 % (ref 18–48)
MAGNESIUM SERPL-MCNC: 2 MG/DL (ref 1.6–2.6)
MCH RBC QN AUTO: 32.3 PG (ref 27–31)
MCHC RBC AUTO-ENTMCNC: 31.2 G/DL (ref 32–36)
MCV RBC AUTO: 104 FL (ref 82–98)
MONOCYTES # BLD AUTO: 0.7 K/UL (ref 0.3–1)
MONOCYTES NFR BLD: 9.7 % (ref 4–15)
NEUTROPHILS # BLD AUTO: 3.4 K/UL (ref 1.8–7.7)
NEUTROPHILS NFR BLD: 46.9 % (ref 38–73)
NRBC BLD-RTO: 0 /100 WBC
PHOSPHATE SERPL-MCNC: 3.3 MG/DL (ref 2.7–4.5)
PLATELET # BLD AUTO: 247 K/UL (ref 150–450)
PMV BLD AUTO: 9.6 FL (ref 9.2–12.9)
POCT GLUCOSE: 122 MG/DL (ref 70–110)
POCT GLUCOSE: 163 MG/DL (ref 70–110)
POCT GLUCOSE: 226 MG/DL (ref 70–110)
POCT GLUCOSE: 69 MG/DL (ref 70–110)
POCT GLUCOSE: 83 MG/DL (ref 70–110)
POTASSIUM SERPL-SCNC: 3.8 MMOL/L (ref 3.5–5.1)
RBC # BLD AUTO: 3.62 M/UL (ref 4–5.4)
SODIUM SERPL-SCNC: 141 MMOL/L (ref 136–145)
T4 FREE SERPL-MCNC: 0.86 NG/DL (ref 0.71–1.51)
TSH SERPL DL<=0.005 MIU/L-ACNC: 3.62 UIU/ML (ref 0.4–4)
WBC # BLD AUTO: 7.23 K/UL (ref 3.9–12.7)

## 2024-06-25 PROCEDURE — 97112 NEUROMUSCULAR REEDUCATION: CPT

## 2024-06-25 PROCEDURE — 94761 N-INVAS EAR/PLS OXIMETRY MLT: CPT

## 2024-06-25 PROCEDURE — 97166 OT EVAL MOD COMPLEX 45 MIN: CPT

## 2024-06-25 PROCEDURE — 84425 ASSAY OF VITAMIN B-1: CPT | Performed by: INTERNAL MEDICINE

## 2024-06-25 PROCEDURE — 80069 RENAL FUNCTION PANEL: CPT | Performed by: INTERNAL MEDICINE

## 2024-06-25 PROCEDURE — 83735 ASSAY OF MAGNESIUM: CPT | Performed by: INTERNAL MEDICINE

## 2024-06-25 PROCEDURE — 84207 ASSAY OF VITAMIN B-6: CPT | Performed by: INTERNAL MEDICINE

## 2024-06-25 PROCEDURE — 82607 VITAMIN B-12: CPT | Performed by: INTERNAL MEDICINE

## 2024-06-25 PROCEDURE — 85025 COMPLETE CBC W/AUTO DIFF WBC: CPT | Performed by: INTERNAL MEDICINE

## 2024-06-25 PROCEDURE — 36415 COLL VENOUS BLD VENIPUNCTURE: CPT | Performed by: INTERNAL MEDICINE

## 2024-06-25 PROCEDURE — 82306 VITAMIN D 25 HYDROXY: CPT | Performed by: INTERNAL MEDICINE

## 2024-06-25 PROCEDURE — 97530 THERAPEUTIC ACTIVITIES: CPT

## 2024-06-25 PROCEDURE — 11000001 HC ACUTE MED/SURG PRIVATE ROOM

## 2024-06-25 PROCEDURE — 63600175 PHARM REV CODE 636 W HCPCS: Performed by: INTERNAL MEDICINE

## 2024-06-25 PROCEDURE — 25000003 PHARM REV CODE 250: Performed by: INTERNAL MEDICINE

## 2024-06-25 PROCEDURE — 84443 ASSAY THYROID STIM HORMONE: CPT | Performed by: INTERNAL MEDICINE

## 2024-06-25 PROCEDURE — 84439 ASSAY OF FREE THYROXINE: CPT | Performed by: INTERNAL MEDICINE

## 2024-06-25 PROCEDURE — 97162 PT EVAL MOD COMPLEX 30 MIN: CPT

## 2024-06-25 RX ORDER — ACETAMINOPHEN 325 MG/1
650 TABLET ORAL 3 TIMES DAILY
Start: 2024-06-26

## 2024-06-25 RX ORDER — FUROSEMIDE 20 MG/1
20 TABLET ORAL DAILY
Status: DISCONTINUED | OUTPATIENT
Start: 2024-06-25 | End: 2024-06-26 | Stop reason: HOSPADM

## 2024-06-25 RX ORDER — IBUPROFEN 200 MG
24 TABLET ORAL
Status: DISCONTINUED | OUTPATIENT
Start: 2024-06-25 | End: 2024-06-26 | Stop reason: HOSPADM

## 2024-06-25 RX ORDER — ACETAMINOPHEN 325 MG/1
650 TABLET ORAL EVERY 8 HOURS PRN
Start: 2024-06-25

## 2024-06-25 RX ORDER — LACTOBACILLUS RHAMNOSUS GG 10B CELL
1 CAPSULE ORAL 2 TIMES DAILY
Start: 2024-06-25

## 2024-06-25 RX ORDER — METHENAMINE HIPPURATE 1000 MG/1
1 TABLET ORAL 2 TIMES DAILY
Start: 2024-06-25

## 2024-06-25 RX ORDER — ESCITALOPRAM OXALATE 20 MG/1
20 TABLET ORAL DAILY
Start: 2024-06-26 | End: 2025-06-26

## 2024-06-25 RX ORDER — CHOLECALCIFEROL (VITAMIN D3) 25 MCG
1000 TABLET ORAL DAILY
Status: DISCONTINUED | OUTPATIENT
Start: 2024-06-26 | End: 2024-06-26 | Stop reason: HOSPADM

## 2024-06-25 RX ORDER — IBUPROFEN 200 MG
16 TABLET ORAL
Status: DISCONTINUED | OUTPATIENT
Start: 2024-06-25 | End: 2024-06-26 | Stop reason: HOSPADM

## 2024-06-25 RX ORDER — LIDOCAINE 50 MG/G
3 PATCH TOPICAL DAILY
Start: 2024-06-25

## 2024-06-25 RX ORDER — HYDROCORTISONE 5 MG/1
10 TABLET ORAL NIGHTLY
Status: DISCONTINUED | OUTPATIENT
Start: 2024-06-25 | End: 2024-06-25

## 2024-06-25 RX ORDER — MONTELUKAST SODIUM 10 MG/1
10 TABLET ORAL NIGHTLY
Start: 2024-06-26 | End: 2024-07-26

## 2024-06-25 RX ORDER — GLUCAGON 1 MG
1 KIT INJECTION
Status: DISCONTINUED | OUTPATIENT
Start: 2024-06-25 | End: 2024-06-26 | Stop reason: HOSPADM

## 2024-06-25 RX ORDER — HYDROCORTISONE 5 MG/1
10 TABLET ORAL NIGHTLY
Status: DISCONTINUED | OUTPATIENT
Start: 2024-06-26 | End: 2024-06-26 | Stop reason: HOSPADM

## 2024-06-25 RX ORDER — BUPROPION HYDROCHLORIDE 150 MG/1
150 TABLET ORAL DAILY
Start: 2024-06-25

## 2024-06-25 RX ORDER — HYDROCORTISONE 5 MG/1
20 TABLET ORAL DAILY
Status: DISCONTINUED | OUTPATIENT
Start: 2024-06-25 | End: 2024-06-26 | Stop reason: HOSPADM

## 2024-06-25 RX ADMIN — ACETAMINOPHEN 650 MG: 325 TABLET ORAL at 08:06

## 2024-06-25 RX ADMIN — BUPROPION HYDROCHLORIDE 150 MG: 150 TABLET, EXTENDED RELEASE ORAL at 08:06

## 2024-06-25 RX ADMIN — ACETAMINOPHEN 650 MG: 325 TABLET ORAL at 05:06

## 2024-06-25 RX ADMIN — LOSARTAN POTASSIUM 100 MG: 50 TABLET, FILM COATED ORAL at 08:06

## 2024-06-25 RX ADMIN — LIDOCAINE 5% 3 PATCH: 700 PATCH TOPICAL at 09:06

## 2024-06-25 RX ADMIN — MICONAZOLE NITRATE: 20 OINTMENT TOPICAL at 10:06

## 2024-06-25 RX ADMIN — PANTOPRAZOLE SODIUM 40 MG: 40 TABLET, DELAYED RELEASE ORAL at 08:06

## 2024-06-25 RX ADMIN — VANCOMYCIN HYDROCHLORIDE 125 MG: KIT at 09:06

## 2024-06-25 RX ADMIN — LEVOTHYROXINE SODIUM 50 MCG: 50 TABLET ORAL at 05:06

## 2024-06-25 RX ADMIN — Medication 1 CAPSULE: at 08:06

## 2024-06-25 RX ADMIN — AMLODIPINE BESYLATE 5 MG: 5 TABLET ORAL at 05:06

## 2024-06-25 RX ADMIN — ESCITALOPRAM OXALATE 20 MG: 10 TABLET ORAL at 08:06

## 2024-06-25 RX ADMIN — FUROSEMIDE 20 MG: 20 TABLET ORAL at 08:06

## 2024-06-25 RX ADMIN — ENOXAPARIN SODIUM 40 MG: 40 INJECTION SUBCUTANEOUS at 05:06

## 2024-06-25 RX ADMIN — DEXTROSE MONOHYDRATE 125 ML: 100 INJECTION, SOLUTION INTRAVENOUS at 08:06

## 2024-06-25 RX ADMIN — ATORVASTATIN CALCIUM 80 MG: 40 TABLET, FILM COATED ORAL at 08:06

## 2024-06-25 RX ADMIN — PIPERACILLIN SODIUM AND TAZOBACTAM SODIUM 4.5 G: 4; .5 INJECTION, POWDER, FOR SOLUTION INTRAVENOUS at 05:06

## 2024-06-25 RX ADMIN — MONTELUKAST 10 MG: 10 TABLET, FILM COATED ORAL at 09:06

## 2024-06-25 RX ADMIN — CLOPIDOGREL BISULFATE 75 MG: 75 TABLET ORAL at 05:06

## 2024-06-25 RX ADMIN — Medication 1 CAPSULE: at 09:06

## 2024-06-25 RX ADMIN — FOLIC ACID-PYRIDOXINE-CYANOCOBALAMIN TAB 2.5-25-2 MG 1 TABLET: 2.5-25-2 TAB at 08:06

## 2024-06-25 RX ADMIN — HYDROCORTISONE SODIUM SUCCINATE 100 MG: 100 INJECTION, POWDER, FOR SOLUTION INTRAMUSCULAR; INTRAVENOUS at 12:06

## 2024-06-25 RX ADMIN — PIPERACILLIN SODIUM AND TAZOBACTAM SODIUM 4.5 G: 4; .5 INJECTION, POWDER, FOR SOLUTION INTRAVENOUS at 12:06

## 2024-06-25 RX ADMIN — ACETAMINOPHEN 650 MG: 325 TABLET ORAL at 09:06

## 2024-06-25 RX ADMIN — HYDROCORTISONE 20 MG: 5 TABLET ORAL at 08:06

## 2024-06-25 RX ADMIN — PIPERACILLIN SODIUM AND TAZOBACTAM SODIUM 4.5 G: 4; .5 INJECTION, POWDER, FOR SOLUTION INTRAVENOUS at 09:06

## 2024-06-25 NOTE — PLAN OF CARE
Problem: Physical Therapy  Goal: Physical Therapy Goal  Description: Goals to be met by: 24     Patient will increase functional independence with mobility by performin. Supine to sit with MInimal Assistance  2. Sit to supine with MInimal Assistance  3. Rolling to either side with Minimal Assistance.  4. Sit to stand transfer with Moderate Assistance using LRAD as needed  5. Bed to chair transfer with Moderate Assistance using LRAD as needed  6. Gait  x 100 feet with Moderate Assistance using LRAD as needed.   7. Wheelchair propulsion x100 feet with Minimal Assistance using bilateral uppper extremities  8. Lower extremity exercise program x10 reps per handout, with independence    Outcome: Progressing     Evaluation complete and goals are appropriate 24

## 2024-06-25 NOTE — NURSING
Nurses Note -- 4 Eyes      6/25/2024   1:31 AM      Skin assessed during: Admit      [] No Altered Skin Integrity Present    []Prevention Measures Documented      [x] Yes- Altered Skin Integrity Present or Discovered   [] LDA Added if Not in Epic (Describe Wound)   [x] New Altered Skin Integrity was Present on Admit and Documented in LDA   [] Wound Image Taken    Wound Care Consulted? Yes    Attending Nurse:  Nasima VELASQUEZ    Second RN/Staff Member:  Fam HOLDER

## 2024-06-25 NOTE — PLAN OF CARE
[facility] - Med Surg ([facility])  Initial Discharge Assessment       Primary Care Provider: Randy Gurrola MD    Admission Diagnosis: Shortness of breath [R06.02]  Encephalopathy [G93.40]  Acute cystitis without hematuria [N30.00]    Admission Date: 6/24/2024  Expected Discharge Date: 6/29/2024    Transition of Care Barriers: (P) None    Payor: MEDICARE / Plan: MEDICARE PART A & B / Product Type: Government /     Extended Emergency Contact Information  Primary Emergency Contact: Diana Diego  Address: [address] [place] [address]           [place] [address] [facility]  Mobile Phone: [phone]  Relation: Daughter  Secondary Emergency Contact: Clarence Mascorro  Mobile Phone: [phone]  Relation: Son  Preferred language: English   needed? No    Discharge Plan A: (P) Rehab (Re-admit [facility])  Discharge Plan B: (P) Assisted Living      [id number]  [address]  [address] [facility]  [address] [place] [address]  Phone: [phone] Fax: [phone]      Initial Assessment (most recent)       Adult Discharge Assessment - 06/25/24 1640          Discharge Assessment    Assessment Type Discharge Planning Assessment     Confirmed/corrected address, phone number and insurance Yes     Confirmed Demographics Correct on Facesheet     Source of Information family     Communicated DAMIR with patient/caregiver Yes (P)      Facility Arrived From: [facility] (P)      Do you expect to return to your current living situation? -- (P)    Per Son Clarence, patient is to re-admit to [facility] upon dc and will [facility].    Do you have help at home or someone to help you manage your care at home? Yes (P)      Who are your caregiver(s) and their phone number(s)? Clarence Mascorro  Son  [phone]   and daughter diana (P)      Prior to hospitilization cognitive status: -- (P)    Per son, A&O x4 when no UTI    Walking or Climbing Stairs  Difficulty yes (P)      Walking or Climbing Stairs ambulation difficulty, requires equipment (P)      Mobility Management wc, walker (P)      Dressing/Bathing Difficulty yes (P)      Dressing/Bathing bathing difficulty, assistance 1 person (P)      Dressing/Bathing Management family assist as needed when at home (P)      Home Accessibility wheelchair accessible (P)      Home Layout Able to live on 1st floor (P)      Equipment Currently Used at Home wheelchair;walker, rolling (P)      Readmission within 30 days? No (P)      Patient currently being followed by outpatient case management? No (P)      Do you currently have service(s) that help you manage your care at home? No (P)      Do you take prescription medications? Yes (P)      Do you have prescription coverage? Yes (P)      Coverage MEDICARE - MEDICARE PART A & B - (P)      Do you have any problems affording any of your prescribed medications? No (P)      Is the patient taking medications as prescribed? yes (P)      Who is going to help you get home at discharge? Clarence Mascorro  [phone] (P)      Are you on dialysis? No (P)      Do you take coumadin? No (P)      Discharge Plan A Rehab (P)    [facility]mit [facility]    Discharge Plan B Assisted Living (P)      DME Needed Upon Discharge  none (P)      Discharge Plan discussed with: Adult children (P)      Transition of Care Barriers None (P)         Financial Resource Strain    How hard is it for you to pay for the very basics like food, housing, medical care, and heating? Not hard at all (P)         Housing Stability    In the last 12 months, was there a time when you were not able to pay the mortgage or rent on time? No (P)      At any time in the past 12 months, were you homeless or living in a shelter (including now)? No (P)         Transportation Needs    Has the lack of transportation kept you from medical appointments, meetings, work or from getting things needed for daily living? No (P)         Food  Insecurity    Within the past 12 months, you worried that your food would run out before you got the money to buy more. Never true (P)      Within the past 12 months, the food you bought just didn't last and you didn't have money to get more. Never true (P)         Alcohol Use    Q1: How often do you have a drink containing alcohol? Never (P)      Q2: How many drinks containing alcohol do you have on a typical day when you are drinking? Patient does not drink (P)      Q3: How often do you have six or more drinks on one occasion? Never (P)         Utilities    In the past 12 months has the electric, gas, oil, or water company threatened to shut off services in your home? No (P)                  Discharge Plan A and Plan B have been determined by review of patient's clinical status, future medical and therapeutic needs, and coverage/benefits for post-acute care in coordination with multidisciplinary team members.                     VANESSA Suarez, LMSW  [facility]  Case Management  Ext. [phone]

## 2024-06-25 NOTE — NURSING
Pt arrived to room via stretcher in no acute distress. Pt AAO x 2, VS stable. Pt unable to fully express needs and concerns but able to answer some questions appropriately. Side rails up x 3, bed alarm set, bed locked and low, call light within reach. POC ongoing.

## 2024-06-25 NOTE — AI DETERIORATION ALERT
RAPID RESPONSE NURSE AI ALERT       AI alert received.    Chart Reviewed: 06/24/2024, 11:29 PM    MRN: [id number]  Bed: MISAEL/MISAEL    Dx: <principal problem not specified>    Ela Mascorro has a past medical history of a Bilateral Carotid Artery Stenosis, a CAD With H/O Stenting, a Cardiac Diastolic Dysfunction, a Chronic Anticoagulation With Plavix, b Hypertension, b SIADH With Chronic Hyponatremia, c Homocystinemia, c Hypercholesterolemia With High HDL, c Mild Hypertriglyceridemia, e Hypothyroidism, f Chronic Adrenal Insufficiency, f Obesity, g Factor VIII Disorder, g Spontaneous Ecchymosis, i Chronic Mild ROMERO, i H/O COVID-19 Infection, i H/O Rib Fractures In 06/2020, j Chronic Diarrhea, j H/O Colon Polyps On 10/11/16 TC, j Hepatic Steatosis, k Low Female Testosterone And DHEA Levels, k Overactive Bladder, k Recurrent UTIs, l H/O 2 Lumbar Compression Fractures In 2017, l H/O Right Foot Surgery, l Lumbar Spinal DDD, l Right 3rd Finger Arthropathy With Edema, m Bilateral Lower Extremity Peripheral Sensory Neuropathy, m Chronic Fatigue, m H/O Cerebrovascular Accident In 06/2021, m Recurrent Headaches, n Grief Reaction, o Allergic Rhinosinusitis, o Right Orbital Pain, o Tongue papillae hypertrophy, q Bilateral Lower Extremity Varicose Veins, q Chronic Bilateral Lower Extremity Edema, q Vitamin D deficiency, and Wellness Visit 1/6/21.    Last VS: BP (!) 116/52 (BP Location: Left arm, Patient Position: Lying)   Pulse 86   Temp 98.5 °F (36.9 °C) (Oral)   Resp 20   Ht 5' (1.524 m)   Wt 83.6 kg (184 lb 4.9 oz)   SpO2 98%   Breastfeeding No   BMI 35.99 kg/m²     24H Vital Sign Range:  Temp:  [97.6 °F (36.4 °C)-98.5 °F (36.9 °C)]   Pulse:  []   Resp:  [18-22]   BP: (116-141)/(52-92)   SpO2:  [95 %-100 %]     Level of Consciousness (AVPU): alert    Recent Labs     06/24/24  1110   WBC 7.79   HGB 13.0   HCT 38.7          Recent Labs     06/24/24  1110      K 5.2*      CO2 21*   BUN 9   CREATININE  0.7   GLU 74   MG 2.0        Recent Labs     06/24/24  1116   PH 7.369   PCO2 48.4*   PO2 24*   HCO3 27.9   POCSATURATED 39   BE 3*        MEWS score: 1    Charge RNOnel contacted for AI alert reports SPO2 and HR erroneously reporting into [facility]. No additional concerns verbalized at this time. Instructed to call [phone] for further concerns or assistance.    Ori Acuna, RN

## 2024-06-25 NOTE — ASSESSMENT & PLAN NOTE
Resolved with fidaxomicin 4/2024. Recently treated on daily oral vancomycin for prophylaxis while on antibiotics for UTIs.   Resume lactobacillus BID  ID consult - vancomycin prophylaxis at 125 mg daily

## 2024-06-25 NOTE — HPI
84 yo with HTN, hypothyroidism, adrenal insufficiency and recurring clostridium difficile infection is here for hallucinations. She has had a long acute medical course since 10/31/2024 after a fall leading to multiple pelvic fractures, followed by recurring admissions for c. diff which ultimately resolved with fidaxomicin two months ago. Since then she has been re-admitted 5/22 and 6/05 for acute encephalopathy due to UTI. She has since been admitted to [facility]. She has finished her course of antibiotics with ciprofloxacin with vancomycin for prophylaxis. This morning she was noted to have oxygenation at 80% on RA and associated hallucinations. She was not found to be hypoxic. She did not have associated fevers, dysuria or any other signs of infection. Daughter states confusion and hallucinations are typical in presence of acute infection.

## 2024-06-25 NOTE — ASSESSMENT & PLAN NOTE
CT head negative for acute intracranial infection   CT head showed at least of couple old remote strokes demonstrating lower cognitive reserve  Treat UTI  PT/OT  Zyprexa 2.5 mg BID prn agitation  Hold pregabalin (at 75 mg TID) for now as higher doses may cause encephalopathy  Somnolence likely reflective of patient's need for sleep  As patient is not diabetic, not sure if glucose in 60s is reflective of clinical hypoglycemia  Delirium precautions  Reduce nursing interruptions  0800 labs

## 2024-06-25 NOTE — CARE UPDATE
RAPID RESPONSE NURSE FOLLOW-UP NOTE       Followed up with patient for chart review .  BG improved to 122. No acute issues at this time. Reviewed plan of care with Charge RNKeren .   Team will continue to follow.  Please call Rapid Response RN, Brinda Carlisle RN with any questions or concerns at [phone].

## 2024-06-25 NOTE — PT/OT/SLP EVAL
"Physical Therapy Evaluation and Treatment    Patient Name:  Ela Mascorro   MRN:  [id number]    Recommendations:     Discharge Recommendations:  (Return to High Intensity Therapy)   Discharge Equipment Recommendations: to be determined by next level of care   Barriers to discharge: Decreased caregiver support and increased amount of skilled needs     Assessment:     Ela Mascorro is a 83 y.o. female admitted with a medical diagnosis of Acute metabolic encephalopathy.  She presents with the following impairments/functional limitations: weakness, decreased coordination, impaired endurance, decreased safety awareness, impaired functional mobility, decreased ROM, impaired balance. Pt tolerated treatment well performing bed mobility and balance training. Pt would continue to benefit form skilled PT until medically stable. Pt would benefit from high intensity PT after discharge due to signifigant decrease in physical function that could not be remedied by low or moderate intensity PT. Pt demonstrated a low level arousal at the beginning of the session, that continue to improve throughout the evaluation as a result of positional changes and her daughter being preset in room mid way through the session..     Rehab Prognosis: Good; patient would benefit from acute skilled PT services to address these deficits and reach maximum level of function.    Recent Surgery: * No surgery found *      Plan:     During this hospitalization, patient to be seen 4 x/week to address the identified rehab impairments via gait training, therapeutic activities and progress toward the following goals:    Plan of Care Expires:  06/24/24    Subjective     Chief Complaint: back and right hip hurt  Patient/Family Comments/goals: [facility]   Pain/Comfort: "my back and hip hurt"      Patients cultural, spiritual, Latter day conflicts given the current situation: no    Living Environment: Pt had a fall on 10/31/23 resulting in a pelvic Fx " but was independent prior and has since been moved between SNF and rehab facilites. Pt was admitted to [facility] on 6/13 and prior to current admission was performing wheelchair propulsion and chair transfer training. Equipment used at home: walker, standard, rollator, wheelchair.  DME owned (not currently used): none.  Upon discharge, patient will have assistance from daughter.      Objective:     Communicated with nsg prior to session.  Patient found supine with peripheral IV, PureWick, telemetry, pulse ox (continuous), blood pressure cuff, bed alarm  upon PT entry to room.    General Precautions: Standard, fall  Orthopedic Precautions:N/A   Braces: N/A  Respiratory Status: Room air    Exams:  Cognitive Exam:  Patient is oriented to Person, Place, and Time  RLE ROM: Deficits: Pt demonstrated significant ROM deficits in the ankle and knee  RLE Strength: Deficits: All major muscle groups 3  LLE ROM: Deficits: t demonstrated significant ROM deficits in the ankle and knee  LLE Strength: Deficits: All major muscle groups 3    Functional Mobility: Pt required cueing of the hands an feet to facilitate sequencing for functional mobility   Bed Mobility:     Rolling Left:  maximal assistance  Scooting: maximal assistance and and to EOB  Supine to Sit: maximal assistance  Sit to Supine: total assistance  Balance: Pt performed sitting balance EOB with Mod A progressing to CGA for 10 mins  Pt demonstrated CGA for static sitting balance EOB, but dynamic sitting balance required Mod A.  Pt continuously demonstrated a posterior LOB during dynamic sitting in addition to trying to offload the R hip by leaning to the left.      AM-PAC 6 CLICK MOBILITY  Total Score:9       Treatment & Education: Pt/daughter verbally educated on PT POC and benefits of physical activitity    Patient left supine with all lines intact, call button in reach, nsg notified, and daughter present.    GOALS:   Multidisciplinary Problems       Physical  Therapy Goals          Problem: Physical Therapy    Goal Priority Disciplines Outcome Goal Variances Interventions   Physical Therapy Goal     PT, PT/OT Progressing     Description: Goals to be met by: 24     Patient will increase functional independence with mobility by performin. Supine to sit with MInimal Assistance  2. Sit to supine with MInimal Assistance  3. Rolling to either side with Minimal Assistance.  4. Sit to stand transfer with Moderate Assistance using LRAD as needed  5. Bed to chair transfer with Moderate Assistance using LRAD as needed  6. Gait  x 100 feet with Moderate Assistance using LRAD as needed.   7. Wheelchair propulsion x100 feet with Minimal Assistance using bilateral uppper extremities  8. Lower extremity exercise program x10 reps per handout, with independence                         History:     Past Medical History:   Diagnosis Date    a Bilateral Carotid Artery Stenosis ###    Dr. Ezra Tanner    a CAD With H/O Stenting     Dr. Ezra Tanner; 10/2020 [facility]/Angiogram (Dr. SHANTAL Tanner) = (Report Scanned)    a Cardiac Diastolic Dysfunction     Dr. Ezra Tanner; 18 Echo = (See Report)    a Chronic Anticoagulation With Plavix     For 2021 Post-CVA Protection    b Hypertension     16 Changed Norvasc 5 Mg Daily To Hydralazine 10 Mg Bid; 16 D/Cd Benicar-HCTZ (HCTZ Decreased Her Na+)    b SIADH With Chronic Hyponatremia #####    16 Referred To Dr. Dov Rasmussen But She Never Went (She Had Been Seeing Dr. Yazan Nunes's Partner); HCTZ Significantly Worsenes Her Hyponatremia    c Homocystinemia     c Hypercholesterolemia With High HDL     17 Increased Lipitor To 80 Mg qHS With Repeat Labs In 6 Weeks    c Mild Hypertriglyceridemia     e Hypothyroidism     17 Increased 25 Mcg Levothyroxine To 50 Mcg qAM With Repeat TFTs In 4 Months    f Chronic Adrenal Insufficiency #####    Dr. Lior Naidu; On Hydrocortisone (Cortef)  10 Mg qAM And 5 Mg qPM    f Obesity     g  "Factor VIII Disorder ###    Dr. Blaine Garcia On 16 Ordered A Lab W/U For Spontaneous Ecchymosis    g Spontaneous Ecchymosis ###    Dr. Blaine Garcia On 16 Ordered A Lab W/U For Spontaneous Ecchymosis    i Chronic Mild ROMERO     i H/O COVID-19 Infection     Her 22 COVID-19 Swab Test = Was Positive    i H/O Rib Fractures In 2020     j Chronic Diarrhea     Dr. HARLEY Choe    j H/O Colon Polyps On 10/11/16 TC ###    Dr. HARLEY Choe: "Repeat TC In 3 YRs"    j Hepatic Steatosis     Dr. HARLEY Choe; 16 Bilateral Renal U/S = Fatty Liver Changes But Otherwise Normal    k Low Female Testosterone And DHEA Levels     Dr. Lior Naidu    k Overactive Bladder     On Toviaz 8 Mg Daily    k Recurrent UTIs     16 Bilateral Renal U/S = Fatty Liver Changes But Otherwise Normal    l H/O 2 Lumbar Compression Fractures In      l H/O Right Foot Surgery     Dr. John Fonseca In     l Lumbar Spinal DDD     l Right 3rd Finger Arthropathy With Edema     Dr. Claudio Mahmood On 2016 Referred Her To Dr. Blaine Garcia For Spontaneous Ecchymosis    m Bilateral Lower Extremity Peripheral Sensory Neuropathy     16 Restarted Lyrica 75 Mg With BMP In 2 Weeks (To Check Na+ Level)    m Chronic Fatigue     m H/O Cerebrovascular Accident In 2021     [facility] 21[facility] Stay For This: With Right Hemianopsia Sequelae    m Recurrent Headaches     n Grief Reaction 2022    On 22 Her  (Mg Mascorro, Who Was Also My Patient)  With CHF And Valvulopathy    o Allergic Rhinosinusitis     Dr. Beto Samuels; Dr. Garcia    o Right Orbital Pain     After A Fall On 20    o Tongue papillae hypertrophy     q Bilateral Lower Extremity Varicose Veins     q Chronic Bilateral Lower Extremity Edema     q Vitamin D deficiency     Wellness Visit 21        Past Surgical History:   Procedure Laterality Date    ANGIOGRAPHY OF KIDNEY N/A 2018    Procedure: " Angiogram Renal Bilateral Selective;  Surgeon: Ezra Tanner MD;  Location: [facility] CATH;  Service: Cardiology;  Laterality: N/A;    CARDIAC SURGERY  3yrs ago    CAROTID ENDARTERECTOMY Right 04/20/2018    CATARACT EXTRACTION      x 2    CORONARY ANGIOGRAPHY N/A 6/13/2018    Procedure: Coronary angiography;  Surgeon: Ezra Tanner MD;  [facility];  Service: Cardiology;  Laterality: N/A;    CORONARY ANGIOPLASTY WITH STENT PLACEMENT  10/2011    FOOT SURGERY      HYSTERECTOMY      TONSILLECTOMY      TOTAL ABDOMINAL HYSTERECTOMY W/ BILATERAL SALPINGOOPHORECTOMY      TOTAL HIP ARTHROPLASTY Left 9 yrs ago       Time Tracking:     PT Received On: 06/25/24  PT Start Time: 0938     PT Stop Time: 1015  PT Total Time (min): 37 min     Billable Minutes: Evaluation 13 mins, Therapeutic Activity 13 mins, and Neuromuscular Re-education 13 mins       06/25/2024

## 2024-06-25 NOTE — ASSESSMENT & PLAN NOTE
Acute UTI  Started on zosyn for now. However, Urine culture grew contaminants  ID curbside recommended - ceftriaxone 1 g IV x 3 days for empiric treatment  Scan bladder q6h to evaluate for retained urine  May need urology follow as outpatient

## 2024-06-25 NOTE — PLAN OF CARE
Problem: Occupational Therapy  Goal: Occupational Therapy Goal  Description: Goals to be met by: 7/25/24     Patient will increase functional independence with ADLs by performing:    UE Dressing with Moderate Assistance.  LE Dressing with Maximum Assistance.  Grooming while seated with Moderate Assistance.  Toileting from bedside commode with Maximum Assistance for hygiene and clothing management.   Sitting at edge of bed x15 minutes with Minimal Assistance.  Rolling to Bilateral with Moderate Assistance.   Supine to sit with Moderate Assistance.  Stand pivot transfers with Maximum Assistance.  Upper extremity exercise program x15 reps per handout, with assistance as needed.    Outcome: Progressing     OT eval completed.

## 2024-06-25 NOTE — ASSESSMENT & PLAN NOTE
Lidocaine to bilateral knees and right hip  Acetaminophen 650 mg TID  Consider restart pregabalin at lower dose when mentally back to baseline

## 2024-06-25 NOTE — CONSULTS
Inpatient consult to Physical Medicine Rehab  Consult performed by: Daysi Calvert NP  Consult ordered by: Georgia Wright MD  Reason for consult: Rehab      Consult received.     DION Crespo, FNP-C  Physical Medicine & Rehabilitation   06/25/2024

## 2024-06-25 NOTE — CLINICAL REVIEW
RAPID RESPONSE NURSE CHART REVIEW        Chart Reviewed: 06/25/2024, 7:08 AM    MRN: [id number]  Bed: 61404/71436 A    Dx: Acute metabolic encephalopathy    Ela Mascorro has a past medical history of a Bilateral Carotid Artery Stenosis, a CAD With H/O Stenting, a Cardiac Diastolic Dysfunction, a Chronic Anticoagulation With Plavix, b Hypertension, b SIADH With Chronic Hyponatremia, c Homocystinemia, c Hypercholesterolemia With High HDL, c Mild Hypertriglyceridemia, e Hypothyroidism, f Chronic Adrenal Insufficiency, f Obesity, g Factor VIII Disorder, g Spontaneous Ecchymosis, i Chronic Mild ROMERO, i H/O COVID-19 Infection, i H/O Rib Fractures In 06/2020, j Chronic Diarrhea, j H/O Colon Polyps On 10/11/16 TC, j Hepatic Steatosis, k Low Female Testosterone And DHEA Levels, k Overactive Bladder, k Recurrent UTIs, l H/O 2 Lumbar Compression Fractures In 2017, l H/O Right Foot Surgery, l Lumbar Spinal DDD, l Right 3rd Finger Arthropathy With Edema, m Bilateral Lower Extremity Peripheral Sensory Neuropathy, m Chronic Fatigue, m H/O Cerebrovascular Accident In 06/2021, m Recurrent Headaches, n Grief Reaction, o Allergic Rhinosinusitis, o Right Orbital Pain, o Tongue papillae hypertrophy, q Bilateral Lower Extremity Varicose Veins, q Chronic Bilateral Lower Extremity Edema, q Vitamin D deficiency, and Wellness Visit 1/6/21.    Last VS: BP (!) 92/51 (BP Location: Left arm, Patient Position: Lying)   Pulse 91   Temp 97.8 °F (36.6 °C) (Oral)   Resp 18   Ht 5' (1.524 m)   Wt 83.6 kg (184 lb 4.9 oz)   SpO2 97%   Breastfeeding No   BMI 35.99 kg/m²     24H Vital Sign Range:  Temp:  [96.3 °F (35.7 °C)-98.5 °F (36.9 °C)]   Pulse:  []   Resp:  [18-22]   BP: ()/(51-92)   SpO2:  [95 %-100 %]     Level of Consciousness (AVPU): alert    Recent Labs     06/24/24  1110   WBC 7.79   HGB 13.0   HCT 38.7          Recent Labs     06/24/24  1110 06/25/24  0310    141   K 5.2* 3.8    109   CO2 21* 22*   BUN  9 10   CREATININE 0.7 0.7   GLU 74 61*   PHOS  --  3.3   MG 2.0 2.0        Recent Labs     06/24/24  1116   PH 7.369   PCO2 48.4*   PO2 24*   HCO3 27.9   POCSATURATED 39   BE 3*        OXYGEN:  Flow (L/min) (Oxygen Therapy): 93     MEWS score: 2    Rounding completed with charge RN , Keren, who reports pt stable. Discussed soft BP. IV antibiotics ordered per primary team. STAT CBC and POCT BG ordered this morning. No additional concerns verbalized at this time. Instructed to call [phone] for further concerns or assistance.    Brinda Carlisle RN

## 2024-06-25 NOTE — PT/OT/SLP EVAL
Occupational Therapy   Evaluation    Name: Ela Mascorro  MRN: [id number]  Admitting Diagnosis: Acute metabolic encephalopathy  Recent Surgery: * No surgery found *      Recommendations:     Discharge Recommendations: High Intensity Therapy  Discharge Equipment Recommendations:  hospital bed, lift device, wheelchair  Barriers to discharge:   (increased (A) required)    Assessment:     Ela Mascorro is a 83 y.o. female with a medical diagnosis of Acute metabolic encephalopathy.  She presents with decreased independence with ADL's. Performance deficits affecting function: weakness, impaired endurance, impaired self care skills, impaired functional mobility, impaired balance, decreased safety awareness, decreased lower extremity function, decreased upper extremity function, decreased coordination, impaired cognition.      Rehab Prognosis: Fair; patient would benefit from acute skilled OT services to address these deficits and reach maximum level of function.       Plan:     Patient to be seen 4 x/week to address the above listed problems via self-care/home management, therapeutic activities, therapeutic exercises, neuromuscular re-education, cognitive retraining  Plan of Care Expires: 07/25/24  Plan of Care Reviewed with: patient    Subjective     Chief Complaint: foot pain  Patient/Family Comments/goals: Pt with mostly incoherent statements during session.    Occupational Profile:  Living Environment: Pt has been in hospital or SNF since 10/23 and then just prior to this admit was at [facility].  Pt unable to state PLOF and no family present. Per rehab notes was requiring (A) at rehab for all ADL's & transfers.   Equipment Used at Home: walker, rolling, wheelchair  Assistance upon Discharge: unknown    Pain/Comfort:  Pain Rating 1:  (did not rate)  Location 1:  (foot)  Pain Addressed 1: Reposition, Distraction, Cessation of Activity, Nurse notified  Pain Rating Post-Intervention 1:  (did not rate)    Patients  cultural, spiritual, Gnosticist conflicts given the current situation: no    Objective:     Communicated with: RN prior to session.  Patient found supine with bed alarm, pulse ox (continuous), telemetry, blood pressure cuff, PureWick (no family present) upon OT entry to room.    General Precautions: Standard, fall  Orthopedic Precautions: N/A  Braces: N/A    Occupational Performance:    Bed Mobility:    Patient completed Rolling/Turning to Left with  maximal assistance  Patient completed Rolling/Turning to Right with maximal assistance  Patient completed Scooting/Bridging with total assistance scooting forward on EOB; dependent drawsheet transfer up HOB while supine  Patient completed Supine to Sit with total assistance  Patient completed Sit to Supine with total assistance    Functional Mobility/Transfers:  NT due to level of (A) at EOB    Activities of Daily Living:  Grooming: total assistance with grooming  Toileting: total assistance while supine due to BM & urinary incontinence    Cognitive/Visual Perceptual:  Cognitive/Psychosocial Skills:     -       Oriented to: Person and Time   -       Follows Commands/attention:Inattentive  -       Communication: dysarthria  -       Safety awareness/insight to disability: impaired   Visual/Perceptual:      -pt with eyes closed majority of session.      Physical Exam:  Dominant hand:    -       right  Upper Extremity Range of Motion:  BUE WFL while supine (shoulder flexion ~90*)  Upper Extremity Strength: BUE WFL except 3-/5 shoulder flexion   Strength: fair BUE  Fine Motor Coordination: impaired BUE    AMPAC 6 Click ADL:  AMPAC Total Score: 6    Treatment & Education:  Pt required Mod-Total (A) for postural control while seated EOB due to posterior leaning.  Provided verbal & physical cues to facilitate postural control. Pt with mostly incoherent speech during session. Provided education regarding role of OT, POC, & discharge recommendations with pt verbalizing  understanding.  Pt had no further questions & when asked whether there were any concerns pt reported none.        Patient left supine with all lines intact, call button in reach, bed alarm on, and RN notified    GOALS:   Multidisciplinary Problems       Occupational Therapy Goals          Problem: Occupational Therapy    Goal Priority Disciplines Outcome Interventions   Occupational Therapy Goal     OT, PT/OT Progressing    Description: Goals to be met by: 7/25/24     Patient will increase functional independence with ADLs by performing:    UE Dressing with Moderate Assistance.  LE Dressing with Maximum Assistance.  Grooming while seated with Moderate Assistance.  Toileting from bedside commode with Maximum Assistance for hygiene and clothing management.   Sitting at edge of bed x15 minutes with Minimal Assistance.  Rolling to Bilateral with Moderate Assistance.   Supine to sit with Moderate Assistance.  Stand pivot transfers with Maximum Assistance.  Upper extremity exercise program x15 reps per handout, with assistance as needed.                         History:     Past Medical History:   Diagnosis Date    a Bilateral Carotid Artery Stenosis ###    Dr. Ezra Tanner    a CAD With H/O Stenting     Dr. Ezra Tanner; 10/2020 [facility]/Angiogram (Dr. SHANTAL Tanner) = (Report Scanned)    a Cardiac Diastolic Dysfunction     Dr. Ezra Tanner; 4/29/18 Echo = (See Report)    a Chronic Anticoagulation With Plavix     For 06/2021 Post-CVA Protection    b Hypertension     8/4/16 Changed Norvasc 5 Mg Daily To Hydralazine 10 Mg Bid; 5/14/16 D/Cd Benicar-HCTZ (HCTZ Decreased Her Na+)    b SIADH With Chronic Hyponatremia #####    5/28/16 Referred To Dr. Dov Rasmussen But She Never Went (She Had Been Seeing Dr. Yazan Nunes's Partner); HCTZ Significantly Worsenes Her Hyponatremia    c Homocystinemia     c Hypercholesterolemia With High HDL     5/17/17 Increased Lipitor To 80 Mg qHS With Repeat Labs In 6 Weeks    c Mild Hypertriglyceridemia     e  "Hypothyroidism     17 Increased 25 Mcg Levothyroxine To 50 Mcg qAM With Repeat TFTs In 4 Months    f Chronic Adrenal Insufficiency #####    Dr. Lior Naidu; On Hydrocortisone (Cortef)  10 Mg qAM And 5 Mg qPM    f Obesity     g Factor VIII Disorder ###    Dr. Blaine Garcia On 16 Ordered A Lab W/U For Spontaneous Ecchymosis    g Spontaneous Ecchymosis ###    Dr. Blaine Garcia On 16 Ordered A Lab W/U For Spontaneous Ecchymosis    i Chronic Mild ROMERO     i H/O COVID-19 Infection     Her 22 COVID-19 Swab Test = Was Positive    i H/O Rib Fractures In 2020     j Chronic Diarrhea     Dr. HARLEY Choe    j H/O Colon Polyps On 10/11/16 TC ###    Dr. HARLEY Choe: "Repeat TC In 3 YRs"    j Hepatic Steatosis     Dr. HARLEY Choe; 16 Bilateral Renal U/S = Fatty Liver Changes But Otherwise Normal    k Low Female Testosterone And DHEA Levels     Dr. Lior Naidu    k Overactive Bladder     On Toviaz 8 Mg Daily    k Recurrent UTIs     16 Bilateral Renal U/S = Fatty Liver Changes But Otherwise Normal    l H/O 2 Lumbar Compression Fractures In      l H/O Right Foot Surgery     Dr. John Fonseca In     l Lumbar Spinal DDD     l Right 3rd Finger Arthropathy With Edema     Dr. Claudio Mahmood On 2016 Referred Her To Dr. Blaine Garcia For Spontaneous Ecchymosis    m Bilateral Lower Extremity Peripheral Sensory Neuropathy     16 Restarted Lyrica 75 Mg With BMP In 2 Weeks (To Check Na+ Level)    m Chronic Fatigue     m H/O Cerebrovascular Accident In 2021     [facility] Stay For This: With Right Hemianopsia Sequelae    m Recurrent Headaches     n Grief Reaction 2022    On 22 Her  (Mg Mascorro, Who Was Also My Patient)  With CHF And Valvulopathy    o Allergic Rhinosinusitis     Dr. Beto Samuels; Dr. Garcia    o Right Orbital Pain     After A Fall On 20    o Tongue papillae hypertrophy     q Bilateral Lower Extremity " Varicose Veins     q Chronic Bilateral Lower Extremity Edema     q Vitamin D deficiency     Wellness Visit 1/6/21          Past Surgical History:   Procedure Laterality Date    ANGIOGRAPHY OF KIDNEY N/A 6/13/2018    Procedure: Angiogram Renal Bilateral Selective;  Surgeon: Ezra Tanner MD;  [facility];  Service: Cardiology;  Laterality: N/A;    CARDIAC SURGERY  3yrs ago    CAROTID ENDARTERECTOMY Right 04/20/2018    CATARACT EXTRACTION      x 2    CORONARY ANGIOGRAPHY N/A 6/13/2018    Procedure: Coronary angiography;  Surgeon: Ezra Tanner MD;  [facility];  Service: Cardiology;  Laterality: N/A;    CORONARY ANGIOPLASTY WITH STENT PLACEMENT  10/2011    FOOT SURGERY      HYSTERECTOMY      TONSILLECTOMY      TOTAL ABDOMINAL HYSTERECTOMY W/ BILATERAL SALPINGOOPHORECTOMY      TOTAL HIP ARTHROPLASTY Left 9 yrs ago       Time Tracking:     OT Date of Treatment: 06/25/24  OT Start Time: 1214  OT Stop Time: 1231  OT Total Time (min): 17 min    Billable Minutes:Evaluation 9  Therapeutic Activity 8    6/25/2024

## 2024-06-25 NOTE — H&P
Hospital Medicine  History and Physical Exam    Team: [facility] HOSP MED A Georgia Wright MD  Admit Date: 6/24/2024  DAMIR   Principal Problem:  <principal problem not specified>   Patient information was obtained from relative(s), past medical records, and ER records.   Primary care Physician: Randy Gurrola MD  Code status: Full Code    HPI: 84 yo with HTN, hypothyroidism, adrenal insufficiency and recurring clostridium difficile infection is here for hallucinations. She has had a long acute medical course since November after a fall leading to pelvic ent admission for pelvic fractures presented to  ED 12/25 with one day of diarrhea and generalized weakness and falls. The patient was treated at [facility] following her admission for pelvic fractures and was discharged home from SNF roughly one week prior to presentation. During that week she initially functioned well however her PO intake was poor and she experienced several falls. On the morning of admission she did not answer her phone and was found on the bathroom floor covered in feces after a fall. On admission she was febrile, tachycardic with leukocytosis and elevated lactic acid. UA suggestive of UTI and she was started on antibiotics and stress dose steroids     Past Medical History: Patient has a past medical history of a Bilateral Carotid Artery Stenosis (###), a CAD With H/O Stenting, a Cardiac Diastolic Dysfunction, a Chronic Anticoagulation With Plavix, b Hypertension, b SIADH With Chronic Hyponatremia (#####), c Homocystinemia, c Hypercholesterolemia With High HDL, c Mild Hypertriglyceridemia, e Hypothyroidism, f Chronic Adrenal Insufficiency (#####), f Obesity, g Factor VIII Disorder (###), g Spontaneous Ecchymosis (###), i Chronic Mild ROMERO, i H/O COVID-19 Infection, i H/O Rib Fractures In 06/2020, j Chronic Diarrhea, j H/O Colon Polyps On 10/11/16 TC (###), j Hepatic Steatosis, k Low Female Testosterone And DHEA Levels, k Overactive  Bladder, k Recurrent UTIs, l H/O 2 Lumbar Compression Fractures In 2017, l H/O Right Foot Surgery, l Lumbar Spinal DDD, l Right 3rd Finger Arthropathy With Edema, m Bilateral Lower Extremity Peripheral Sensory Neuropathy, m Chronic Fatigue, m H/O Cerebrovascular Accident In 06/2021, m Recurrent Headaches, n Grief Reaction (05/21/2022), o Allergic Rhinosinusitis, o Right Orbital Pain, o Tongue papillae hypertrophy, q Bilateral Lower Extremity Varicose Veins, q Chronic Bilateral Lower Extremity Edema, q Vitamin D deficiency, and Wellness Visit 1/6/21.    Past Surgical History: Patient has a past surgical history that includes Total hip arthroplasty (Left, 9 yrs ago); Coronary angioplasty with stent (10/2011); Cataract extraction; Tonsillectomy; Cardiac surgery (3yrs ago); Foot surgery; Hysterectomy; Carotid endarterectomy (Right, 04/20/2018); Coronary angiography (N/A, 6/13/2018); Angiography of kidney (N/A, 6/13/2018); and Total abdominal hysterectomy w/ bilateral salpingoophorectomy.    Social History: Patient reports that she has never smoked. She has never used smokeless tobacco. She reports that she does not drink alcohol and does not use drugs.    Family History: family history includes Cancer in her mother and sister; Heart disease in her father; Hyperlipidemia in her sister; Hypertension in her mother and sister; Ovarian cancer (age of onset: 68) in her sister; Ovarian cancer (age of onset: 85) in her mother; Thyroid disease in her daughter.    Medications:   Prior to Admission medications    Medication Sig   buPROPion (WELLBUTRIN XL) 300 MG 24 hr tablet Take 300 mg by mouth once daily.   furosemide (LASIX) 20 MG tablet Take 20 mg by mouth once daily.   losartan (COZAAR) 100 MG tablet Take 100 mg by mouth once daily.   multivitamin with folic acid 400 mcg Tab Take 1 tablet by mouth once daily.   OLANZapine (ZYPREXA) 2.5 MG tablet Take 2.5 mg by mouth every evening.   ondansetron (ZOFRAN) 4 MG tablet Take 4 mg  by mouth every 8 (eight) hours as needed.   rosuvastatin (CRESTOR) 20 MG tablet Take 20 mg by mouth every evening.   midodrine (PROAMATINE) 10 MG tablet Take 10 mg by mouth once daily.   acetaminophen (TYLENOL) 325 MG tablet Take 2 tablets (650 mg total) by mouth every 8 (eight) hours as needed.  Patient taking differently: Take 650 mg by mouth every 8 (eight) hours as needed for Pain or Temperature greater than (mild pain or fever > 100.4).   albuterol (VENTOLIN HFA) 90 mcg/actuation inhaler Inhale 2 puffs into the lungs every 6 (six) hours as needed for Wheezing. Rescue   alendronate (FOSAMAX) 70 MG tablet Take 70 mg by mouth every 7 days.   amLODIPine (NORVASC) 5 MG tablet Take 5 mg by mouth Daily.   CALCIUM CARBONATE/VITAMIN D3 (VITAMIN D-3 ORAL) Take 600 tablets by mouth 2 (two) times a day. VITAMIN D3 TABS   clopidogreL (PLAVIX) 75 mg tablet Take 75 mg by mouth Daily.   cyanocobalamin (VITAMIN B-12) 1000 MCG tablet Take 1,000 mcg by mouth once daily.   EPINEPHrine (EPIPEN) 0.3 mg/0.3 mL AtIn Inject 1 each into the muscle as needed (anaphylaxis).    fluticasone propionate (FLOVENT HFA) 110 mcg/actuation inhaler Inhale 2 puffs into the lungs every 12 (twelve) hours. Controller   hydroCHLOROthiazide (HYDRODIURIL) 12.5 MG Tab Take 12.5 mg by mouth once daily.   hydrocortisone (CORTEF) 5 MG Tab Take 2 tabs PO every morning, then take 1 tab PO every afternoon   Lactobacillus rhamnosus GG (CULTURELLE) 10 billion cell capsule Take 1 capsule by mouth once daily.   levothyroxine (SYNTHROID) 50 MCG tablet Take 1 tablet (50 mcg total) by mouth before breakfast.   nitroGLYCERIN (NITROSTAT) 0.4 MG SL tablet Place 0.4 mg under the tongue every 5 (five) minutes as needed for Chest pain.   pantoprazole (PROTONIX) 40 MG tablet Take 40 mg by mouth once daily.    venlafaxine (EFFEXOR-XR) 150 MG Cp24 TAKE 1 CAPSULE (150 MG TOTAL) BY MOUTH EVERY MORNING.   atorvastatin (LIPITOR) 80 MG tablet TAKE 1 TABLET BY MOUTH EVERY DAY AT  NIGHT   BYSTOLIC 5 mg Tab TAKE 1 TABLET BY MOUTH EVERY DAY IN THE EVENING   diazePAM (VALIUM) 5 MG tablet 1 TABLET AS NEEDED ORALLY 30 MINUTES PRIOR TO PROCEDURE MAY REPEAT ONCE 1 DAYS   EScitalopram oxalate (LEXAPRO) 10 MG tablet TAKE 1 TABLET BY MOUTH DAILY FOR 3 DAYS, THEN INCREASE TO 2 TABLETS DAILY AFTERWARDS   fexofenadine (ALLEGRA) 180 MG tablet Take 1 tablet (180 mg total) by mouth every evening.  Patient taking differently: Take 180 mg by mouth as needed (allergies).   HYDROcodone-acetaminophen (NORCO)  mg per tablet Take 1 tablet by mouth every 8 (eight) hours as needed for Pain.   ipratropium (ATROVENT) 42 mcg (0.06 %) nasal spray 2 sprays by Nasal route 2 (two) times daily.    metoprolol succinate (TOPROL-XL) 25 MG 24 hr tablet Take 25 mg by mouth once daily.   montelukast (SINGULAIR) 10 mg tablet TAKE 1 TABLET BY MOUTH EVERY DAY   nutritional supplement - fiber Liqd Take 1 Dose by mouth once daily. JUICE PLUS FIBRE LIQD   ondansetron (ZOFRAN-ODT) 8 MG TbDL Take 1 tablet (8 mg total) by mouth every 6 (six) hours as needed.   pregabalin (LYRICA) 75 MG capsule Take 1 capsule (75 mg total) by mouth 3 (three) times daily.   RESTASIS 0.05 % ophthalmic emulsion Place 1 drop into both eyes 2 (two) times daily.   VOLTAREN 1 % Gel Apply 2 g topically 4 (four) times daily.        Allergies: Patient is allergic to azithromycin, cefdinir, ceftriaxone, clarithromycin, codeine, hydralazine analogues, hydrochlorothiazide, levofloxacin, moxifloxacin, sulfamethoxazole-trimethoprim, and iodinated contrast media.    ROS    Constitutional: neg for fever or chills  Eyes: neg for visual changes  ENT: neg for nasal congestion or sore throat  Respiratory: neg for cough or shortness of breath  Cardiovascular: neg for chest pain or palpitations  Gastrointestinal: neg for nausea or vomiting or abdominal pain. Neg for change in bowel habits  Genitourinary: neg for hematuria or dysuria  Integument/Breast: neg for rash or  pruritis  Hematologic/Lymphatic: neg for easy bruising neg for lymphadenopathy   Musculoskeletal: neg for arthralgias or myalgias  Neurological: neg for seizures or tremors  Endocrine: neg for heat or cold intolerance    PEx  Temp:  [97.6 °F (36.4 °C)]   Pulse:  []   Resp:  [18-22]   BP: (118-141)/(60-92)   SpO2:  [95 %-100 %]   Body mass index is 35.99 kg/m².     General: well developed, well nourished, elderly, chronically ill-appearing  Eyes: conjunctivae/corneas clear.   Head: normocephalic, atraumatic.   Neck: supple, symmetrical, trachea midline, neg for JVD, neg for carotid bruits  Lungs: clear to auscultation bilaterally, normal respiratory effort  Heart: regular rate and rhythm, neg for murmur  Extremities: neg for edema, neg for joint swelling, pulses: 2+ at ankles   Abdomen: Soft, non-tender; liver and spleen not palpable, bowel sounds active,m neg for aortic bruits  Skin: Skin color, texture, turgor normal. Neg for rashes or lesions.   Neurologic: CN II-XII grossly intact, DTR: 2/4 bilaterally. Normal muscle strength and tone. Neg for focal numbness or weakness  Mental status: Alert, oriented to name and date but not place or situation, she admits to visual and auditory hallucinations, affect appropriate, memory intact    Recent Labs   Lab 06/24/24  1110   WBC 7.79   HGB 13.0   HCT 38.7        Recent Labs   Lab 06/24/24  1110      K 5.2*      CO2 21*   BUN 9   CREATININE 0.7   GLU 74   CALCIUM 8.4*   MG 2.0     Recent Labs   Lab 06/24/24  1110   ALKPHOS 165*   ALT 78*   AST 68*   ALBUMIN 2.7*   PROT 5.4*   BILITOT 0.4    Assessment and Plan:    * Acute metabolic encephalopathy  CT head negative for acute intracranial infection   CT head showed at least of couple old remote strokes demonstrating lower cognitive reserve  Treat UTI  PT/OT  Zyprexa 2.5 mg BID prn agitation  Hold pregabalin (at 75 mg TID) for now as higher doses may cause encephalopathy    Recurrent UTIs  Acute  UTI  Zosyn for now  ID consult  Scan bladder q6h to evaluate for retained urine  May need urology follow as outpatient     History of infection of intestine due to Clostridioides difficile  Resolved with fidaxomicin 4/2024. Recently treated on daily oral vancomycin for prophylaxis while on antibiotics for UTIs.   Resume lactobacillus BID  ID consult - vancomycin prophylaxis at 125 mg daily    Essential (primary) hypertension  Resume losartan 100 mg daily and amlodipine 5 mg daily    Chronic congestive heart failure with left ventricular diastolic dysfunction  Furosemide 20 mg daily    DJD (degenerative joint disease), multiple sites  Lidocaine to bilateral knees and right hip  Acetaminophen 650 mg TID  Consider restart pregabalin at lower dose when mentally back to baseline    Adrenal insufficiency  Resume hydrcortisone at 10 mg qAM and 5 mg qhs    Severe anxiety  Resume bupropion  mg daily  Escitalopram 20 mg daily    Vitamin D deficiency  Check vitamin D level    Hypothyroidism  Levothyroxine 50 mcg daily    Diet:  regular diet  GI PPx: not needed  DVT PPx:  enoxaparin  Airways: room air  Wounds: none    Goals of Care: Return to prior functional status  Discharge plan: home    Georgia Wright MD

## 2024-06-26 VITALS
DIASTOLIC BLOOD PRESSURE: 77 MMHG | HEART RATE: 112 BPM | SYSTOLIC BLOOD PRESSURE: 127 MMHG | WEIGHT: 184.31 LBS | BODY MASS INDEX: 36.18 KG/M2 | OXYGEN SATURATION: 99 % | HEIGHT: 60 IN | RESPIRATION RATE: 17 BRPM | TEMPERATURE: 98 F

## 2024-06-26 LAB
ALBUMIN SERPL BCP-MCNC: 2.2 G/DL (ref 3.5–5.2)
ALP SERPL-CCNC: 131 U/L (ref 55–135)
ALT SERPL W/O P-5'-P-CCNC: 48 U/L (ref 10–44)
ANION GAP SERPL CALC-SCNC: 9 MMOL/L (ref 8–16)
AST SERPL-CCNC: 32 U/L (ref 10–40)
BILIRUB SERPL-MCNC: 0.4 MG/DL (ref 0.1–1)
BUN SERPL-MCNC: 10 MG/DL (ref 8–23)
CALCIUM SERPL-MCNC: 8.4 MG/DL (ref 8.7–10.5)
CHLORIDE SERPL-SCNC: 109 MMOL/L (ref 95–110)
CO2 SERPL-SCNC: 22 MMOL/L (ref 23–29)
CREAT SERPL-MCNC: 0.7 MG/DL (ref 0.5–1.4)
EST. GFR  (NO RACE VARIABLE): >60 ML/MIN/1.73 M^2
GLUCOSE SERPL-MCNC: 71 MG/DL (ref 70–110)
MAGNESIUM SERPL-MCNC: 2.1 MG/DL (ref 1.6–2.6)
PHOSPHATE SERPL-MCNC: 2.7 MG/DL (ref 2.7–4.5)
POCT GLUCOSE: 81 MG/DL (ref 70–110)
POCT GLUCOSE: 88 MG/DL (ref 70–110)
POTASSIUM SERPL-SCNC: 2.7 MMOL/L (ref 3.5–5.1)
PROT SERPL-MCNC: 4.5 G/DL (ref 6–8.4)
SODIUM SERPL-SCNC: 140 MMOL/L (ref 136–145)
VIT B12 SERPL-MCNC: 361 NG/L (ref 180–914)

## 2024-06-26 PROCEDURE — 83735 ASSAY OF MAGNESIUM: CPT | Performed by: INTERNAL MEDICINE

## 2024-06-26 PROCEDURE — 99222 1ST HOSP IP/OBS MODERATE 55: CPT | Mod: ,,, | Performed by: NURSE PRACTITIONER

## 2024-06-26 PROCEDURE — 97110 THERAPEUTIC EXERCISES: CPT

## 2024-06-26 PROCEDURE — 63600175 PHARM REV CODE 636 W HCPCS: Performed by: INTERNAL MEDICINE

## 2024-06-26 PROCEDURE — 36415 COLL VENOUS BLD VENIPUNCTURE: CPT | Performed by: INTERNAL MEDICINE

## 2024-06-26 PROCEDURE — 80053 COMPREHEN METABOLIC PANEL: CPT | Performed by: INTERNAL MEDICINE

## 2024-06-26 PROCEDURE — 25000003 PHARM REV CODE 250: Performed by: INTERNAL MEDICINE

## 2024-06-26 PROCEDURE — 97535 SELF CARE MNGMENT TRAINING: CPT

## 2024-06-26 PROCEDURE — 84100 ASSAY OF PHOSPHORUS: CPT | Performed by: INTERNAL MEDICINE

## 2024-06-26 RX ORDER — HYDROCORTISONE 10 MG/1
10 TABLET ORAL NIGHTLY
Start: 2024-06-26

## 2024-06-26 RX ORDER — HYDROCORTISONE 20 MG/1
20 TABLET ORAL DAILY
Start: 2024-06-27

## 2024-06-26 RX ORDER — SODIUM,POTASSIUM PHOSPHATES 280-250MG
2 POWDER IN PACKET (EA) ORAL
Start: 2024-06-26 | End: 2024-06-29

## 2024-06-26 RX ORDER — CHOLECALCIFEROL (VITAMIN D3) 50 MCG
2000 TABLET ORAL DAILY
Start: 2024-06-26

## 2024-06-26 RX ORDER — FUROSEMIDE 20 MG/1
20 TABLET ORAL DAILY
Start: 2024-06-26 | End: 2025-06-26

## 2024-06-26 RX ORDER — LOSARTAN POTASSIUM 50 MG/1
50 TABLET ORAL DAILY
Status: DISCONTINUED | OUTPATIENT
Start: 2024-06-27 | End: 2024-06-26 | Stop reason: HOSPADM

## 2024-06-26 RX ORDER — POTASSIUM CHLORIDE 750 MG/1
30 CAPSULE, EXTENDED RELEASE ORAL
Status: DISCONTINUED | OUTPATIENT
Start: 2024-06-26 | End: 2024-06-26

## 2024-06-26 RX ORDER — SODIUM,POTASSIUM PHOSPHATES 280-250MG
2 POWDER IN PACKET (EA) ORAL
Status: DISCONTINUED | OUTPATIENT
Start: 2024-06-26 | End: 2024-06-26 | Stop reason: HOSPADM

## 2024-06-26 RX ORDER — HYDROCORTISONE 20 MG/1
20 TABLET ORAL DAILY
Qty: 10 TABLET | Refills: 0 | Status: SHIPPED | OUTPATIENT
Start: 2024-06-27 | End: 2024-06-26

## 2024-06-26 RX ORDER — POTASSIUM CHLORIDE 20 MEQ/1
TABLET, EXTENDED RELEASE ORAL
Start: 2024-06-26 | End: 2025-06-29

## 2024-06-26 RX ORDER — POTASSIUM CHLORIDE 750 MG/1
30 CAPSULE, EXTENDED RELEASE ORAL
Status: COMPLETED | OUTPATIENT
Start: 2024-06-26 | End: 2024-06-26

## 2024-06-26 RX ORDER — HYDROCORTISONE 10 MG/1
10 TABLET ORAL NIGHTLY
Qty: 10 TABLET | Refills: 0 | Status: SHIPPED | OUTPATIENT
Start: 2024-06-26 | End: 2024-06-26

## 2024-06-26 RX ADMIN — CHOLECALCIFEROL TAB 25 MCG (1000 UNIT) 1000 UNITS: 25 TAB at 09:06

## 2024-06-26 RX ADMIN — FUROSEMIDE 20 MG: 20 TABLET ORAL at 09:06

## 2024-06-26 RX ADMIN — ACETAMINOPHEN 650 MG: 325 TABLET ORAL at 03:06

## 2024-06-26 RX ADMIN — POTASSIUM & SODIUM PHOSPHATES POWDER PACK 280-160-250 MG 2 PACKET: 280-160-250 PACK at 03:06

## 2024-06-26 RX ADMIN — LEVOTHYROXINE SODIUM 50 MCG: 50 TABLET ORAL at 05:06

## 2024-06-26 RX ADMIN — MICONAZOLE NITRATE: 20 OINTMENT TOPICAL at 09:06

## 2024-06-26 RX ADMIN — ATORVASTATIN CALCIUM 80 MG: 40 TABLET, FILM COATED ORAL at 09:06

## 2024-06-26 RX ADMIN — Medication 1 CAPSULE: at 09:06

## 2024-06-26 RX ADMIN — BUPROPION HYDROCHLORIDE 150 MG: 150 TABLET, EXTENDED RELEASE ORAL at 09:06

## 2024-06-26 RX ADMIN — POTASSIUM & SODIUM PHOSPHATES POWDER PACK 280-160-250 MG 2 PACKET: 280-160-250 PACK at 10:06

## 2024-06-26 RX ADMIN — PANTOPRAZOLE SODIUM 40 MG: 40 TABLET, DELAYED RELEASE ORAL at 09:06

## 2024-06-26 RX ADMIN — CEFTRIAXONE 1 G: 1 INJECTION, POWDER, FOR SOLUTION INTRAMUSCULAR; INTRAVENOUS at 10:06

## 2024-06-26 RX ADMIN — ESCITALOPRAM OXALATE 20 MG: 10 TABLET ORAL at 09:06

## 2024-06-26 RX ADMIN — LOSARTAN POTASSIUM 100 MG: 50 TABLET, FILM COATED ORAL at 09:06

## 2024-06-26 RX ADMIN — ACETAMINOPHEN 650 MG: 325 TABLET ORAL at 09:06

## 2024-06-26 RX ADMIN — FOLIC ACID-PYRIDOXINE-CYANOCOBALAMIN TAB 2.5-25-2 MG 1 TABLET: 2.5-25-2 TAB at 09:06

## 2024-06-26 RX ADMIN — HYDROCORTISONE 20 MG: 5 TABLET ORAL at 09:06

## 2024-06-26 RX ADMIN — ACETAMINOPHEN 650 MG: 325 TABLET ORAL at 12:06

## 2024-06-26 RX ADMIN — POTASSIUM CHLORIDE 30 MEQ: 750 CAPSULE, EXTENDED RELEASE ORAL at 10:06

## 2024-06-26 RX ADMIN — POTASSIUM CHLORIDE 30 MEQ: 750 CAPSULE, EXTENDED RELEASE ORAL at 12:06

## 2024-06-26 RX ADMIN — POTASSIUM CHLORIDE 30 MEQ: 750 CAPSULE, EXTENDED RELEASE ORAL at 02:06

## 2024-06-26 NOTE — PLAN OF CARE
Discharge Plan A and Plan B have been determined by review of patient's clinical status, future medical and therapeutic needs, and coverage/benefits for post-acute care in coordination with multidisciplinary team members.      06/26/24 1035   Post-Acute Status   Post-Acute Authorization Placement   Post-Acute Placement Status Pending post-acute provider review/more information requested   Coverage MEDICARE - MEDICARE PART A & B -   Discharge Plan   Discharge Plan A Rehab  (Re-admit [facility])   Discharge Plan B Assisted Living     SW f/u w/ [facility] admissions and PMR team to request update on if patient clinically ok to re-admit today. [facility] to review and confirm w/ SW if patient accepted to return to [facility].    12:15pm  SW confirmed that patient accepted for re-admit to [facility]. SW placed [facility] orders for patient transport. Patient's daughter Diana notified and agreeable to dc plan.     SW will continue to follow.                            Keyur Mcclure, VANESSA, LMSW  [facility]  Case Management  Ext. [phone]

## 2024-06-26 NOTE — ASSESSMENT & PLAN NOTE
-CTH neg for acute finding.  -S/P treatment for UTI with Rocephin.  -Required one time dose of IM Zyprexa. Now fully oriented. No behavioral concerns.  -On C-diff prophylaxis treatment.

## 2024-06-26 NOTE — HOSPITAL COURSE
Per chart review,    PT-06/25    Functional Mobility: Pt required cueing of the hands an feet to facilitate sequencing for functional mobility   Bed Mobility:     Rolling Left:  maximal assistance  Scooting: maximal assistance and and to EOB  Supine to Sit: maximal assistance  Sit to Supine: total assistance    OT- 06/25    Bed Mobility:    Patient completed Rolling/Turning to Left with  maximal assistance  Patient completed Rolling/Turning to Right with maximal assistance  Patient completed Scooting/Bridging with total assistance scooting forward on EOB; dependent drawsheet transfer up HOB while supine  Patient completed Supine to Sit with total assistance  Patient completed Sit to Supine with total assistance     Functional Mobility/Transfers:  NT due to level of (A) at EOB     Activities of Daily Living:  Grooming: total assistance with grooming  Toileting: total assistance

## 2024-06-26 NOTE — ASSESSMENT & PLAN NOTE
[facility]  -Now on Vanc Po prophylactically.  -Monitor stool consistency and frequency.

## 2024-06-26 NOTE — PT/OT/SLP PROGRESS
Occupational Therapy   Treatment    Name: Ela Mascorro  MRN: [id number]  Admitting Diagnosis:  Acute metabolic encephalopathy       Recommendations:     Discharge Recommendations: High Intensity Therapy  Discharge Equipment Recommendations:  wheelchair, hospital bed  Barriers to discharge:   (increased (A) required)    Assessment:     Ela Mascorro is a 83 y.o. female with a medical diagnosis of Acute metabolic encephalopathy.  She presents with good participation and motivation. Pt continues to be at risk for falls.  Pt with greatly improved alertness & active participation during session. Performance deficits affecting function are weakness, impaired endurance, impaired self care skills, impaired functional mobility, impaired balance, decreased safety awareness, decreased lower extremity function, decreased upper extremity function, pain.     Rehab Prognosis:  Fair; patient would benefit from acute skilled OT services to address these deficits and reach maximum level of function.       Plan:     Patient to be seen 4 x/week to address the above listed problems via self-care/home management, therapeutic activities, therapeutic exercises, neuromuscular re-education  Plan of Care Expires: 07/25/24  Plan of Care Reviewed with: patient    Subjective     Chief Complaint: right hip pain  Patient/Family Comments/goals: Pt reported that she was scared of standing.  Pain/Comfort:  Pain Rating 1:  (did not rate)  Location 1:  (right hip)  Pain Addressed 1: Reposition, Distraction, Cessation of Activity, Nurse notified  Pain Rating Post-Intervention 1:  (did not rate)    Objective:     Communicated with: RN prior to session.  Patient found supine with telemetry, PureWick, pressure relief boots (no family present) upon OT entry to room.    General Precautions: Standard, fall    Orthopedic Precautions:N/A  Braces: N/A     Occupational Performance:     Bed Mobility:    Patient completed Scooting/Bridging with total  assistance with scooting forward on EOB & up HOB while supine  Patient completed Supine to Sit with moderate assistance  Patient completed Sit to Supine with moderate assistance     Activities of Daily Living:  Grooming: stand by assistance with brushing teeth while seated EOB      [facility] 6 Click ADL: 12    Treatment & Education:  Pt performed AROM exercises with BUE in 3 planes x 2 sets x 10 reps each.  SBA with postural control while seated EOB.  Pt had no further questions & when asked whether there were any concerns pt reported none.      Patient left supine with all lines intact, call button in reach, bed alarm on, and RN notified    GOALS:   Multidisciplinary Problems       Occupational Therapy Goals          Problem: Occupational Therapy    Goal Priority Disciplines Outcome Interventions   Occupational Therapy Goal     OT, PT/OT Progressing    Description: Goals to be met by: 7/25/24     Patient will increase functional independence with ADLs by performing:    UE Dressing with SBA.  LE Dressing with Maximum Assistance.  Grooming while seated with Supervision  Toileting from bedside commode with Maximum Assistance for hygiene and clothing management.   Sitting at edge of bed x15 minutes with Supervision.  Rolling to Bilateral with Moderate Assistance.   Supine to sit with Minimal Assistance.  Stand pivot transfers with Moderate Assistance.  Upper extremity exercise program x15 reps per handout, with assistance as needed.                         Time Tracking:     OT Date of Treatment: 06/26/24  OT Start Time: 1148  OT Stop Time: 1213  OT Total Time (min): 25 min    Billable Minutes:Self Care/Home Management 15  Therapeutic Exercise 10    OT/MARGE: OT          6/26/2024

## 2024-06-26 NOTE — PLAN OF CARE
Problem: Adult Inpatient Plan of Care  Goal: Plan of Care Review  6/26/2024 0355 by Philly Gray, RN  Outcome: Progressing     Problem: Adult Inpatient Plan of Care  Goal: Patient-Specific Goal (Individualized)  6/26/2024 0355 by Philly Gray, RN  Outcome: Progressing     Problem: Confusion Acute  Goal: Optimal Cognitive Function  Outcome: Progressing     Problem: Infection  Goal: Absence of Infection Signs and Symptoms  Outcome: Progressing     Pt slept through most of shift, was oriented to self, time and place. No acute events during shift.

## 2024-06-26 NOTE — PLAN OF CARE
"   [facility]     Department of Hospital Medicine     [address] [place] [address]     [phone] after hours  [phone] fax                                        FACILITY TRANSFER ORDERS     06/25/2024    Admit to:  [facility]    Diagnoses:  Active Hospital Problems    Diagnosis  POA    *Acute metabolic encephalopathy [G93.41]  Yes     Priority: 1 - High    Recurrent UTIs [N39.0]  Yes     Priority: 2     History of infection of intestine due to Clostridioides difficile [Z86.19]  Yes     Priority: 3     Essential (primary) hypertension [I10]  Yes     Priority: 10     DJD (degenerative joint disease), multiple sites [M15.9]  Yes    Chronic congestive heart failure with left ventricular diastolic dysfunction [I50.32]  Yes    Adrenal insufficiency [E27.40]  Yes    Severe anxiety [F41.9]  Yes    Hypothyroidism [E03.9]  Yes    Vitamin D deficiency [E55.9]  Yes      Resolved Hospital Problems   No resolved problems to display.       Vital Signs: Routine.    Allergies:  Review of patient's allergies indicates:   Allergen Reactions    Azithromycin Diarrhea    Cefdinir     Clarithromycin Diarrhea    Codeine      Other reaction(s): makes "sick"    Hydralazine analogues      Increased urinary frequency    Hydrochlorothiazide      Significantly worsens her hyponatremia    Levofloxacin Diarrhea    Moxifloxacin Diarrhea    Sulfamethoxazole-trimethoprim      Other reaction(s): diarrhea    Iodinated contrast media      Other reaction(s): Decreased blood pressure       Diet: regular diet    Acitivities: as tolerated    Nursing: per unit routine    Labs: renal function panel twice weekly while on potassium cupplements    Consults: PT/OT  Current Discharge Medication List        START taking these medications    Details   cholecalciferol, vitamin D3, (VITAMIN D3) 50 mcg (2,000 unit) Tab Take 1 tablet (2,000 Units total) by mouth once daily.      EScitalopram oxalate " (LEXAPRO) 20 MG tablet Take 1 tablet (20 mg total) by mouth once daily.      folic acid-vit B6-vit B12 2.5-25-2 mg (FOLBIC OR EQUIV) 2.5-25-2 mg Tab Take 1 tablet by mouth once daily.      !! hydrocortisone (CORTEF) 10 MG Tab Take 1 tablet (10 mg total) by mouth every evening        LIDOcaine (LIDODERM) 5 % Place 3 patches onto the skin once daily. Remove & Discard patch within 12 hours or as directed by MD      methenamine (HIPREX) 1 gram Tab Take 1 tablet (1 g total) by mouth 2 (two) times daily.      miconazole nitrate 2% (MICOTIN) 2 % Oint Apply topically 2 (two) times daily. Apply to groin, perineum and glutes BID and as needed for cleansing.      montelukast (SINGULAIR) 10 mg tablet Take 1 tablet (10 mg total) by mouth every evening.      potassium chloride SA (K-DUR,KLOR-CON) 20 MEQ tablet Take 1 tablet (20 mEq total) by mouth 3 (three) times daily with meals for 3 days, THEN 1 tablet (20 mEq total) 2 (two) times daily.      potassium, sodium phosphates (PHOS-NAK) 280-160-250 mg PwPk Take 2 packets by mouth 4 (four) times daily before meals and nightly. for 3 days      vancomycin 125 mg/5 mL Soln Take 5 mLs (125 mg total) by mouth once daily. for 3 days       !! - Potential duplicate medications found. Please discuss with provider.        CONTINUE these medications which have CHANGED    Details   !! acetaminophen (TYLENOL) 325 MG tablet Take 2 tablets (650 mg total) by mouth 3 (three) times daily.      !! acetaminophen (TYLENOL) 325 MG tablet Take 2 tablets (650 mg total) by mouth every 8 (eight) hours as needed for Pain or Temperature greater than (mild pain or fever > 100.4).      buPROPion (WELLBUTRIN XL) 150 MG TB24 tablet Take 1 tablet (150 mg total) by mouth once daily.      furosemide (LASIX) 20 MG tablet Take 1 tablet (20 mg total) by mouth once daily.      !! hydrocortisone (CORTEF) 20 MG Tab Take 1 tablet (20 mg total) by mouth once daily.       Lactobacillus rhamnosus GG (CULTURELLE) 10 billion  cell capsule Take 1 capsule by mouth 2 (two) times a day.       !! - Potential duplicate medications found. Please discuss with provider.        CONTINUE these medications which have NOT CHANGED    Details   losartan (COZAAR) 100 MG tablet Take 100 mg by mouth once daily.      multivitamin with folic acid 400 mcg Tab Take 1 tablet by mouth once daily.      rosuvastatin (CRESTOR) 20 MG tablet Take 20 mg by mouth every evening.      alendronate (FOSAMAX) 70 MG tablet Take 70 mg by mouth every 7 days.      clopidogreL (PLAVIX) 75 mg tablet Take 75 mg by mouth Daily.      levothyroxine (SYNTHROID) 50 MCG tablet Take 1 tablet (50 mcg total) by mouth before breakfast.  Qty: 90 tablet, Refills: 3    Associated Diagnoses: Hypothyroidism, unspecified type      pantoprazole (PROTONIX) 40 MG tablet Take 40 mg by mouth once daily.        !! - Potential duplicate medications found. Please discuss with provider.       Potassium   _________________________________  Georgia Wright MD  06/25/2024'

## 2024-06-26 NOTE — PT/OT/SLP PROGRESS
Physical Therapy      Patient Name:  Ela Mascorro   MRN:  [id number]    Patient not seen today secondary to at 14:18 PM pt reporting having already worked with therapy today. Pt reporting she is being discharged today, and is waiting for transport to take her to [facility] down the [facility]. Will follow-up at next PT POC date.

## 2024-06-26 NOTE — PLAN OF CARE
Problem: Occupational Therapy  Goal: Occupational Therapy Goal  Description: Goals to be met by: 7/25/24     Patient will increase functional independence with ADLs by performing:    UE Dressing with SBA.  LE Dressing with Maximum Assistance.  Grooming while seated with Supervision  Toileting from bedside commode with Maximum Assistance for hygiene and clothing management.   Sitting at edge of bed x15 minutes with Supervision.  Rolling to Bilateral with Moderate Assistance.   Supine to sit with Minimal Assistance.  Stand pivot transfers with Moderate Assistance.  Upper extremity exercise program x15 reps per handout, with assistance as needed.    Outcome: Progressing     Goals updated.

## 2024-06-26 NOTE — PROGRESS NOTES
Hospital Medicine  Progress note    Team: [facility] A Georgia Wright MD  Admit Date: 6/24/2024  Code status: Full Code    Principal Problem:  Acute metabolic encephalopathy    Interval hx:  Patient was somnolent this morning. Sugars in 60s. She remains sleepy despite correction of sugars. Was able to participate with therapy    PEx  Temp:  [96.3 °F (35.7 °C)-97.9 °F (36.6 °C)]   Pulse:  []   Resp:  [18-19]   BP: ()/(51-76)   SpO2:  [79 %-99 %]     Intake/Output Summary (Last 24 hours) at 6/25/2024 2251  Last data filed at 6/25/2024 1659  Gross per 24 hour   Intake 720 ml   Output 1200 ml   Net -480 ml     General Appearance: no acute distress, WD, elderly, chornically ill-appearing  Heart: regular rate and rhythm, no heave  Respiratory: Normal respiratory effort, symmetric excursion, bilateral vesicular breath sounds   Abdomen: Soft, non-tender; bowel sounds active  Skin: intact, no rash, no ulcers  Neurologic:  No focal numbness or weakness  Mental status: Alert, oriented x 4, affect appropriate    Recent Labs   Lab 06/24/24  1110 06/25/24  0840   WBC 7.79 7.23   HGB 13.0 11.7*   HCT 38.7 37.5    247     Recent Labs   Lab 06/24/24  1110 06/25/24  0310    141   K 5.2* 3.8    109   CO2 21* 22*   BUN 9 10   CREATININE 0.7 0.7   GLU 74 61*   CALCIUM 8.4* 8.8   MG 2.0 2.0   PHOS  --  3.3     Recent Labs   Lab 06/24/24  1110 06/25/24  0310   ALKPHOS 165*  --    ALT 78*  --    AST 68*  --    ALBUMIN 2.7* 2.4*   PROT 5.4*  --    BILITOT 0.4  --         Recent Labs   Lab 06/25/24  0732 06/25/24  0858 06/25/24  1117 06/25/24  1620 06/25/24  1937   POCTGLUCOSE 69* 122* 83 163* 226*       Scheduled Meds:   acetaminophen  650 mg Oral TID    amLODIPine  5 mg Oral Daily    atorvastatin  80 mg Oral Daily    buPROPion  150 mg Oral Daily    [START ON 6/26/2024] cefTRIAXone (Rocephin) IV (PEDS and ADULTS)  1 g Intravenous Daily    clopidogreL  75 mg Oral Daily    enoxparin  40 mg Subcutaneous Daily     EScitalopram oxalate  20 mg Oral Daily    folic acid-vit B6-vit B12 2.5-25-2 mg  1 tablet Oral Daily    furosemide  20 mg Oral Daily    [START ON 6/26/2024] hydrocortisone  10 mg Oral QHS    hydrocortisone  20 mg Oral Daily    Lactobacillus rhamnosus GG  1 capsule Oral BID    levothyroxine  50 mcg Oral Before breakfast    LIDOcaine  3 patch Transdermal Q24H    losartan  100 mg Oral Daily    miconazole nitrate 2%   Topical (Top) BID    montelukast  10 mg Oral QHS    pantoprazole  40 mg Oral Daily    vancomycin  125 mg Oral QHS    [START ON 6/26/2024] vitamin D  1,000 Units Oral Daily     Continuous Infusions:  As Needed:    Current Facility-Administered Medications:     acetaminophen, 650 mg, Oral, Q4H PRN    dextrose 10%, 12.5 g, Intravenous, PRN    dextrose 10%, 25 g, Intravenous, PRN    glucagon (human recombinant), 1 mg, Intramuscular, PRN    glucose, 16 g, Oral, PRN    glucose, 24 g, Oral, PRN    melatonin, 6 mg, Oral, Nightly PRN    naloxone, 0.02 mg, Intravenous, PRN    OLANZapine, 2.5 mg, Oral, BID PRN    ondansetron, 4 mg, Oral, Q8H PRN    sodium chloride 0.9%, 10 mL, Intravenous, Q12H PRN    Assessment and Plan  / Problems managed today    * Acute metabolic encephalopathy  CT head negative for acute intracranial infection   CT head showed at 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 However, Urine culture grew contaminants  ID matthew recommended - ceftriaxone 1 g IV x 3 days for empiric treatment  Scan bladder q6h to evaluate for retained urine  May need urology follow as  outpatient     History of infection of intestine due to Clostridioides difficile  Resolved with fidaxomicin 4/2024. Recently treated on daily oral vancomycin for prophylaxis while on antibiotics for UTIs.   Resume lactobacillus BID  ID consult - vancomycin prophylaxis at 125 mg daily    Essential (primary) hypertension  Resume losartan 100 mg daily and amlodipine 5 mg daily    Chronic congestive heart failure with left ventricular diastolic dysfunction  Furosemide 20 mg daily    DJD (degenerative joint disease), multiple sites  Lidocaine to bilateral knees and right hip  Acetaminophen 650 mg TID  Consider restart pregabalin at lower dose when mentally back to baseline    Adrenal insufficiency  Resume hydrcortisone at 10 mg qAM and 5 mg qhs    Severe anxiety  Resume bupropion  mg daily  Escitalopram 20 mg daily    Hypothyroidism  Levothyroxine 50 mcg daily    Diet:  regular diet  GI PPx: protonix  DVT PPx:  enoxaparin  Airways: room air  Wounds: none    Goals of Care:  Return to prior functional status \    Discharge Planning   DAMIR: 6/29/2024   Is the patient medically ready for discharge?:     Reason for patient still in hospital (select all that apply): Patient trending condition and Treatment  Discharge Plan A: Rehab (Re-admit [facility])        Georgia Wright MD

## 2024-06-26 NOTE — SUBJECTIVE & OBJECTIVE
"Past Medical History:   Diagnosis Date    a Bilateral Carotid Artery Stenosis ###    Dr. Ezra Tanner    a CAD With H/O Stenting     Dr. Ezra Tanner; 10/2020 [facility]/Angiogram (Dr. SHANTAL Tanner) = (Report Scanned)    a Cardiac Diastolic Dysfunction     Dr. Ezra Tanner; 4/29/18 Echo = (See Report)    a Chronic Anticoagulation With Plavix     For 06/2021 Post-CVA Protection    b Hypertension     8/4/16 Changed Norvasc 5 Mg Daily To Hydralazine 10 Mg Bid; 5/14/16 D/Cd Benicar-HCTZ (HCTZ Decreased Her Na+)    b SIADH With Chronic Hyponatremia #####    5/28/16 Referred To Dr. Dov Rasmussen But She Never Went (She Had Been Seeing Dr. Yazan Nunes's Partner); HCTZ Significantly Worsenes Her Hyponatremia    c Homocystinemia     c Hypercholesterolemia With High HDL     5/17/17 Increased Lipitor To 80 Mg qHS With Repeat Labs In 6 Weeks    c Mild Hypertriglyceridemia     e Hypothyroidism     5/17/17 Increased 25 Mcg Levothyroxine To 50 Mcg qAM With Repeat TFTs In 4 Months    f Chronic Adrenal Insufficiency #####    Dr. Lior Naidu; On Hydrocortisone (Cortef)  10 Mg qAM And 5 Mg qPM    f Obesity     g Factor VIII Disorder ###    Dr. Blaine Garcia On 9/14/16 Ordered A Lab W/U For Spontaneous Ecchymosis    g Spontaneous Ecchymosis ###    Dr. Blaine Garcia On 9/14/16 Ordered A Lab W/U For Spontaneous Ecchymosis    i Chronic Mild ROMERO     i H/O COVID-19 Infection     Her 1/6/22 COVID-19 Swab Test = Was Positive    i H/O Rib Fractures In 06/2020     j Chronic Diarrhea     Dr. HARLEY Choe    j H/O Colon Polyps On 10/11/16 TC ###    Dr. HARLEY Choe: "Repeat TC In 3 YRs"    j Hepatic Steatosis     Dr. HARLEY Choe; 7/8/16 Bilateral Renal U/S = Fatty Liver Changes But Otherwise Normal    k Low Female Testosterone And DHEA Levels     Dr. Lior Naidu    k Overactive Bladder     On Toviaz 8 Mg Daily    k Recurrent UTIs     7/8/16 Bilateral Renal U/S = Fatty Liver Changes But Otherwise Normal    l H/O 2 Lumbar Compression " "Fractures In 2017     l H/O Right Foot Surgery     Dr. John Fonseca In 2016    l Lumbar Spinal DDD     l Right 3rd Finger Arthropathy With Edema     Dr. Claudio Mahmood On 2016 Referred Her To Dr. Blaine Garcia For Spontaneous Ecchymosis    m Bilateral Lower Extremity Peripheral Sensory Neuropathy     16 Restarted Lyrica 75 Mg With BMP In 2 Weeks (To Check Na+ Level)    m Chronic Fatigue     m H/O Cerebrovascular Accident In 2021     [facility] 21[facility]21 Stay For This: With Right Hemianopsia Sequelae    m Recurrent Headaches     n Grief Reaction 2022    On 22 Her  (Mg Mascorro, Who Was Also My Patient)  With CHF And Valvulopathy    o Allergic Rhinosinusitis     Dr. Beto Samuels; Dr. Garcia    o Right Orbital Pain     After A Fall On 20    o Tongue papillae hypertrophy     q Bilateral Lower Extremity Varicose Veins     q Chronic Bilateral Lower Extremity Edema     q Vitamin D deficiency     Wellness Visit 21      Past Surgical History:   Procedure Laterality Date    ANGIOGRAPHY OF KIDNEY N/A 2018    Procedure: Angiogram Renal Bilateral Selective;  Surgeon: Ezra Tanner MD;  [facility];  Service: Cardiology;  Laterality: N/A;    CARDIAC SURGERY  3yrs ago    CAROTID ENDARTERECTOMY Right 2018    CATARACT EXTRACTION      x 2    CORONARY ANGIOGRAPHY N/A 2018    Procedure: Coronary angiography;  Surgeon: Ezra Tanner MD;  [facility];  Service: Cardiology;  Laterality: N/A;    CORONARY ANGIOPLASTY WITH STENT PLACEMENT  10/2011    FOOT SURGERY      HYSTERECTOMY      TONSILLECTOMY      TOTAL ABDOMINAL HYSTERECTOMY W/ BILATERAL SALPINGOOPHORECTOMY      TOTAL HIP ARTHROPLASTY Left 9 yrs ago     Review of patient's allergies indicates:   Allergen Reactions    Azithromycin Diarrhea    Cefdinir     Clarithromycin Diarrhea    Codeine      Other reaction(s): makes "sick"    Hydralazine analogues      Increased urinary frequency    " Hydrochlorothiazide      Significantly worsens her hyponatremia    Levofloxacin Diarrhea    Moxifloxacin Diarrhea    Sulfamethoxazole-trimethoprim      Other reaction(s): diarrhea    Iodinated contrast media      Other reaction(s): Decreased blood pressure       Scheduled Medications:    acetaminophen  650 mg Oral TID    amLODIPine  5 mg Oral Daily    atorvastatin  80 mg Oral Daily    buPROPion  150 mg Oral Daily    clopidogreL  75 mg Oral Daily    enoxparin  40 mg Subcutaneous Daily    EScitalopram oxalate  20 mg Oral Daily    folic acid-vit B6-vit B12 2.5-25-2 mg  1 tablet Oral Daily    furosemide  20 mg Oral Daily    hydrocortisone  10 mg Oral QHS    hydrocortisone  20 mg Oral Daily    Lactobacillus rhamnosus GG  1 capsule Oral BID    levothyroxine  50 mcg Oral Before breakfast    LIDOcaine  3 patch Transdermal Q24H    [START ON 6/27/2024] losartan  50 mg Oral Daily    miconazole nitrate 2%   Topical (Top) BID    montelukast  10 mg Oral QHS    pantoprazole  40 mg Oral Daily    potassium chloride  30 mEq Oral Q2H    potassium, sodium phosphates  2 packet Oral QID (AC & HS)    vancomycin  125 mg Oral QHS    vitamin D  1,000 Units Oral Daily       PRN Medications:   Current Facility-Administered Medications:     acetaminophen, 650 mg, Oral, Q4H PRN    dextrose 10%, 12.5 g, Intravenous, PRN    dextrose 10%, 25 g, Intravenous, PRN    glucagon (human recombinant), 1 mg, Intramuscular, PRN    glucose, 16 g, Oral, PRN    glucose, 24 g, Oral, PRN    melatonin, 6 mg, Oral, Nightly PRN    naloxone, 0.02 mg, Intravenous, PRN    OLANZapine, 2.5 mg, Oral, BID PRN    ondansetron, 4 mg, Oral, Q8H PRN    sodium chloride 0.9%, 10 mL, Intravenous, Q12H PRN    Family History       Problem Relation (Age of Onset)    Cancer Mother, Sister    Heart disease Father    Hyperlipidemia Sister    Hypertension Mother, Sister    Ovarian cancer Mother (85), Sister (68)    Thyroid disease Daughter          Tobacco Use    Smoking status: Never     Smokeless tobacco: Never   Substance and Sexual Activity    Alcohol use: No    Drug use: No    Sexual activity: Not Currently     Partners: Male     Review of Systems   Constitutional:  Positive for activity change.   Respiratory:  Negative for cough and shortness of breath.    Musculoskeletal:  Positive for gait problem.   Psychiatric/Behavioral:  Positive for decreased concentration.      Objective:     Vital Signs (Most Recent):  Temp: 98.7 °F (37.1 °C) (06/26/24 1117)  Pulse: (!) 118 (06/26/24 1117)  Resp: 16 (06/26/24 1117)  BP: 131/62 (06/26/24 1117)  SpO2: 97 % (06/26/24 1117)    Vital Signs (24h Range):  Temp:  [97.6 °F (36.4 °C)-98.7 °F (37.1 °C)] 98.7 °F (37.1 °C)  Pulse:  [] 118  Resp:  [16-18] 16  SpO2:  [96 %-99 %] 97 %  BP: ()/(52-70) 131/62     Body mass index is 35.99 kg/m².     Physical Exam  Vitals and nursing note reviewed.   Constitutional:       Appearance: Normal appearance. She is obese.   HENT:      Head: Normocephalic and atraumatic.   Pulmonary:      Effort: Pulmonary effort is normal. No respiratory distress.   Abdominal:      General: There is no distension.      Palpations: Abdomen is soft.   Musculoskeletal:      Cervical back: Normal range of motion and neck supple.   Skin:     General: Skin is warm and dry.      Capillary Refill: Capillary refill takes 2 to 3 seconds.   Neurological:      General: No focal deficit present.      Mental Status: She is alert and oriented to person, place, and time.      GCS: GCS eye subscore is 4. GCS verbal subscore is 5. GCS motor subscore is 6.      Motor: Weakness present.      Coordination: Heel to Shin Test abnormal. Impaired rapid alternating movements.      Gait: Gait abnormal.   Psychiatric:         Behavior: Behavior is slowed. Behavior is cooperative.          NEUROLOGICAL EXAMINATION:     MENTAL STATUS   Oriented to person, place, and time.       Diagnostic Results: Labs: Reviewed

## 2024-06-26 NOTE — HPI
Per chart review, 84 yo with HTN, hypothyroidism, adrenal insufficiency and recurring clostridium difficile infection is here for hallucinations. She has had a long acute medical course since November after a fall leading to pelvic ent admission for pelvic fractures presented to  ED in December. The pt was discharged to SNF after hospitalization. Pt had another admission to hospital due to falling. Was found to have UTI and was treated with antibiotics and steroids. Pt was then transferred to [facility]. This admit, pt presented due to hypoxia and visual hallucinations. Pt received one time dose of IM Zyprexa. Mental status and alertness improved. O2 requirement improved to RA with no resp distress. Pt is to complete Vanc Po for C-diff prophylaxis. PM &R was consulted to evaluate pt for post acute placement.         Functional History: Patient lives  was at SNF after initial hospitalization. Pt came from [facility]. Prior to admission, Pt was at [facility] working with therapies..  DME: [facility].

## 2024-06-26 NOTE — NURSING
At 1230 report [facility] Will DC IV's, awaiting transport.     At 1515, transport arrived for pt.

## 2024-06-26 NOTE — HPI
"84 yo with HTN, hypothyroidism, adrenal insufficiency and recurring clostridium difficile presented after being found down covered in feces. In the ED she was tachycardic and febrile. Labs remarkable for leukocytosis and elevated LA. UA suggestive of UTI and she was started on antibiotics and stress dose steroids.    ID consulted for: " Recurring c. diff colitis - just finished a vancomycin taper. Now with UTI. Should she be on c diff treatment currently? "  "

## 2024-06-26 NOTE — CONSULTS
[facility] - Med Surg ([place] [id number])  Physical Medicine & Rehab  Consult Note    Patient Name: Ela Mascorro  MRN: [id number]  Admission Date: 6/24/2024  Hospital Length of Stay: 2 days  Attending Physician: Georgia Wright MD   Consults  Subjective:     Principal Problem: Acute metabolic encephalopathy    HPI:  Per chart review, 82 yo with HTN, hypothyroidism, adrenal insufficiency and recurring clostridium difficile infection is here for hallucinations. She has had a long acute medical course since November after a fall leading to pelvic ent admission for pelvic fractures presented to  ED in December. The pt was discharged to SNF after hospitalization. Pt had another admission to hospital due to falling. Was found to have UTI and was treated with antibiotics and steroids. Pt was then transferred to [facility]. This admit, pt presented due to hypoxia and visual hallucinations. Pt received one time dose of IM Zyprexa. Mental status and alertness improved. O2 requirement improved to RA with no resp distress. Pt is to complete Vanc Po for C-diff prophylaxis. PM &R was consulted to evaluate pt for post acute placement.         Functional History: Patient lives  was at SNF after initial hospitalization. Pt came from [facility]. Prior to admission, Pt was at [facility] working with therapies..  DME: RW.      Hospital Course: Per chart review,    PT-06/25    Functional Mobility: Pt required cueing of the hands an feet to facilitate sequencing for functional mobility   Bed Mobility:     Rolling Left:  maximal assistance  Scooting: maximal assistance and and to EOB  Supine to Sit: maximal assistance  Sit to Supine: total assistance    OT- 06/25    Bed Mobility:    Patient completed Rolling/Turning to Left with  maximal assistance  Patient completed Rolling/Turning to Right with maximal assistance  Patient completed Scooting/Bridging with total assistance scooting forward on EOB; dependent drawsheet transfer up [facility] while  "supine  Patient completed Supine to Sit with total assistance  Patient completed Sit to Supine with total assistance     Functional Mobility/Transfers:  NT due to level of (A) at EOB     Activities of Daily Living:  Grooming: total assistance with grooming  Toileting: total assistance     Past Medical History:   Diagnosis Date    a Bilateral Carotid Artery Stenosis ###    Dr. Ezra Tanner    a CAD With H/O Stenting     Dr. Ezra Tanner; 10/2020 LHC/Angiogram (Dr. SHANTAL Tanner) = (Report Scanned)    a Cardiac Diastolic Dysfunction     Dr. Ezra Tanner; 4/29/18 Echo = (See Report)    a Chronic Anticoagulation With Plavix     For 06/2021 Post-CVA Protection    b Hypertension     8/4/16 Changed Norvasc 5 Mg Daily To Hydralazine 10 Mg Bid; 5/14/16 D/Cd Benicar-HCTZ (HCTZ Decreased Her Na+)    b SIADH With Chronic Hyponatremia #####    5/28/16 Referred To Dr. Dov Rasmussen But She Never Went (She Had Been Seeing Dr. Yaazn Nunes's Partner); HCTZ Significantly Worsenes Her Hyponatremia    c Homocystinemia     c Hypercholesterolemia With High HDL     5/17/17 Increased Lipitor To 80 Mg qHS With Repeat Labs In 6 Weeks    c Mild Hypertriglyceridemia     e Hypothyroidism     5/17/17 Increased 25 Mcg Levothyroxine To 50 Mcg qAM With Repeat TFTs In 4 Months    f Chronic Adrenal Insufficiency #####    Dr. Lior Naidu; On Hydrocortisone (Cortef)  10 Mg qAM And 5 Mg qPM    f Obesity     g Factor VIII Disorder ###    Dr. Blaine Garcia On 9/14/16 Ordered A Lab W/U For Spontaneous Ecchymosis    g Spontaneous Ecchymosis ###    Dr. Blaine Garcia On 9/14/16 Ordered A Lab W/U For Spontaneous Ecchymosis    i Chronic Mild ROMERO     i H/O COVID-19 Infection     Her 1/6/22 COVID-19 Swab Test = Was Positive    i H/O Rib Fractures In 06/2020     j Chronic Diarrhea     Dr. HARLEY Choe    j H/O Colon Polyps On 10/11/16 TC ###    Dr. HARLEY Choe: "Repeat TC In 3 YRs"    j Hepatic Steatosis     Dr. HARLEY Choe; 7/8/16 Bilateral Renal " U/S = Fatty Liver Changes But Otherwise Normal    k Low Female Testosterone And DHEA Levels     Dr. Lior Naidu    k Overactive Bladder     On Toviaz 8 Mg Daily    k Recurrent UTIs     16 Bilateral Renal U/S = Fatty Liver Changes But Otherwise Normal    l H/O 2 Lumbar Compression Fractures In 2017     l H/O Right Foot Surgery     Dr. John Fonseca In     l Lumbar Spinal DDD     l Right 3rd Finger Arthropathy With Edema     Dr. Claudio Mahmood On 2016 Referred Her To Dr. Blaine Garcia For Spontaneous Ecchymosis    m Bilateral Lower Extremity Peripheral Sensory Neuropathy     16 Restarted Lyrica 75 Mg With BMP In 2 Weeks (To Check Na+ Level)    m Chronic Fatigue     m H/O Cerebrovascular Accident In 2021     [facility]21 Stay For This: With Right Hemianopsia Sequelae    m Recurrent Headaches     n Grief Reaction 2022    On 22 Her  (Mg Mascorro, Who Was Also My Patient)  With CHF And Valvulopathy    o Allergic Rhinosinusitis     Dr. Beto Samuels; Dr. Garcia    o Right Orbital Pain     After A Fall On 20    o Tongue papillae hypertrophy     q Bilateral Lower Extremity Varicose Veins     q Chronic Bilateral Lower Extremity Edema     q Vitamin D deficiency     Wellness Visit 21      Past Surgical History:   Procedure Laterality Date    ANGIOGRAPHY OF KIDNEY N/A 2018    Procedure: Angiogram Renal Bilateral Selective;  Surgeon: Ezra Tanner MD;  [facility];  Service: Cardiology;  Laterality: N/A;    CARDIAC SURGERY  3yrs ago    CAROTID ENDARTERECTOMY Right 2018    CATARACT EXTRACTION      x 2    CORONARY ANGIOGRAPHY N/A 2018    Procedure: Coronary angiography;  Surgeon: Ezra Tanner MD;  Location: [facility];  Service: Cardiology;  Laterality: N/A;    CORONARY ANGIOPLASTY WITH STENT PLACEMENT  10/2011    FOOT SURGERY      HYSTERECTOMY      TONSILLECTOMY      TOTAL ABDOMINAL HYSTERECTOMY W/ BILATERAL  "SALPINGOOPHORECTOMY      TOTAL HIP ARTHROPLASTY Left 9 yrs ago     Review of patient's allergies indicates:   Allergen Reactions    Azithromycin Diarrhea    Cefdinir     Clarithromycin Diarrhea    Codeine      Other reaction(s): makes "sick"    Hydralazine analogues      Increased urinary frequency    Hydrochlorothiazide      Significantly worsens her hyponatremia    Levofloxacin Diarrhea    Moxifloxacin Diarrhea    Sulfamethoxazole-trimethoprim      Other reaction(s): diarrhea    Iodinated contrast media      Other reaction(s): Decreased blood pressure       Scheduled Medications:    acetaminophen  650 mg Oral TID    amLODIPine  5 mg Oral Daily    atorvastatin  80 mg Oral Daily    buPROPion  150 mg Oral Daily    clopidogreL  75 mg Oral Daily    enoxparin  40 mg Subcutaneous Daily    EScitalopram oxalate  20 mg Oral Daily    folic acid-vit B6-vit B12 2.5-25-2 mg  1 tablet Oral Daily    furosemide  20 mg Oral Daily    hydrocortisone  10 mg Oral QHS    hydrocortisone  20 mg Oral Daily    Lactobacillus rhamnosus GG  1 capsule Oral BID    levothyroxine  50 mcg Oral Before breakfast    LIDOcaine  3 patch Transdermal Q24H    [START ON 6/27/2024] losartan  50 mg Oral Daily    miconazole nitrate 2%   Topical (Top) BID    montelukast  10 mg Oral QHS    pantoprazole  40 mg Oral Daily    potassium chloride  30 mEq Oral Q2H    potassium, sodium phosphates  2 packet Oral QID (AC & HS)    vancomycin  125 mg Oral QHS    vitamin D  1,000 Units Oral Daily       PRN Medications:   Current Facility-Administered Medications:     acetaminophen, 650 mg, Oral, Q4H PRN    dextrose 10%, 12.5 g, Intravenous, PRN    dextrose 10%, 25 g, Intravenous, PRN    glucagon (human recombinant), 1 mg, Intramuscular, PRN    glucose, 16 g, Oral, PRN    glucose, 24 g, Oral, PRN    melatonin, 6 mg, Oral, Nightly PRN    naloxone, 0.02 mg, Intravenous, PRN    OLANZapine, 2.5 mg, Oral, BID PRN    ondansetron, 4 mg, Oral, Q8H PRN    sodium chloride 0.9%, 10 mL, " Intravenous, Q12H PRN    Family History       Problem Relation (Age of Onset)    Cancer Mother, Sister    Heart disease Father    Hyperlipidemia Sister    Hypertension Mother, Sister    Ovarian cancer Mother (85), Sister (68)    Thyroid disease Daughter          Tobacco Use    Smoking status: Never    Smokeless tobacco: Never   Substance and Sexual Activity    Alcohol use: No    Drug use: No    Sexual activity: Not Currently     Partners: Male     Review of Systems   Constitutional:  Positive for activity change.   Respiratory:  Negative for cough and shortness of breath.    Musculoskeletal:  Positive for gait problem.   Psychiatric/Behavioral:  Positive for decreased concentration.      Objective:     Vital Signs (Most Recent):  Temp: 98.7 °F (37.1 °C) (06/26/24 1117)  Pulse: (!) 118 (06/26/24 1117)  Resp: 16 (06/26/24 1117)  BP: 131/62 (06/26/24 1117)  SpO2: 97 % (06/26/24 1117)    Vital Signs (24h Range):  Temp:  [97.6 °F (36.4 °C)-98.7 °F (37.1 °C)] 98.7 °F (37.1 °C)  Pulse:  [] 118  Resp:  [16-18] 16  SpO2:  [96 %-99 %] 97 %  BP: ()/(52-70) 131/62     Body mass index is 35.99 kg/m².     Physical Exam  Vitals and nursing note reviewed.   Constitutional:       Appearance: Normal appearance. She is obese.   HENT:      Head: Normocephalic and atraumatic.   Pulmonary:      Effort: Pulmonary effort is normal. No respiratory distress.   Abdominal:      General: There is no distension.      Palpations: Abdomen is soft.   Musculoskeletal:      Cervical back: Normal range of motion and neck supple.   Skin:     General: Skin is warm and dry.      Capillary Refill: Capillary refill takes 2 to 3 seconds.   Neurological:      General: No focal deficit present.      Mental Status: She is alert and oriented to person, place, and time.      GCS: GCS eye subscore is 4. GCS verbal subscore is 5. GCS motor subscore is 6.      Motor: Weakness present.      Coordination: Heel to Shin Test abnormal. Impaired rapid  alternating movements.      Gait: Gait abnormal.   Psychiatric:         Behavior: Behavior is slowed. Behavior is cooperative.          NEUROLOGICAL EXAMINATION:     MENTAL STATUS   Oriented to person, place, and time.       Diagnostic Results: Labs: Reviewed  Assessment/Plan:     * Acute metabolic encephalopathy  -CTH neg for acute finding.  -S/P treatment for UTI with Rocephin.  -Required one time dose of IM Zyprexa. Now fully oriented. No behavioral concerns.  -On C-diff prophylaxis treatment.     History of infection of intestine due to Clostridioides difficile  -Pos C-diff at [facility].  -Now on Vanc Po prophylactically.  -Monitor stool consistency and frequency.     Recurrent UTIs  -S/P Rocephin.     Essential (primary) hypertension  -Stable.    PM&R Recommendation:     At this time, the PM&R team has reviewed this patient's ongoing medical case including inpatient diagnosis, medical history, clinical examination, labs, vitals, current social and functional history to provide the post-acute recommendation as follows:     RECOMMENDATIONS: inpatient rehabilitation due to fair to good potential for improvement with therapies and need of physician oversight for management of ongoing active medical issues, once medically stable.       The patient will be admitted for comprehensive interdisciplinary inpatient rehabilitation to address the impairments due to her medical diagnosis of Acute metabolic encephalopathy. The patient will benefit from an inpatient rehabilitation program to promote functional recovery, implement compensatory strategies and will undergo assessment for needs for durable medical equipment for safe discharge to the community. This patient will benefit from a coordinated interdisciplinary rehabilitation program services that require close monitoring and treatment with 24-hour rehabilitative nursing and  physical/occupational therapies for 3 hours/day for 5 days/week. This interdisciplinary program  will be performed under the direction of a physiatrist.        We will sign off due to discharge soon.       Thank you for your consult.     Windy Mccord NP  Department of Physical Medicine & Rehab  [facility] ([place]-)

## 2024-06-26 NOTE — PLAN OF CARE
Hospital Medicine  Progress note    [place] [facility] [place] [facility] [place] Georgia Wright MD  Admit Date: 6/24/2024  Code status: Full Code    Principal Problem:  Acute metabolic encephalopathy    Interval hx:  Patient was somnolent this morning. Sugars in 60s. She remains sleepy despite correction of sugars. Was able to participate with therapy    PEx  Temp:  [96.3 °F (35.7 °C)-97.9 °F (36.6 °C)]   Pulse:  []   Resp:  [18-19]   BP: ()/(51-76)   SpO2:  [79 %-99 %]     Intake/Output Summary (Last 24 hours) at 6/25/2024 2244  Last data filed at 6/25/2024 1659  Gross per 24 hour   Intake 720 ml   Output 1200 ml   Net -480 ml     General Appearance: no acute distress, WD, elderly, chornically ill-appearing  Heart: regular rate and rhythm, no heave  Respiratory: Normal respiratory effort, symmetric excursion, bilateral vesicular breath sounds   Abdomen: Soft, non-tender; bowel sounds active  Skin: intact, no rash, no ulcers  Neurologic:  No focal numbness or weakness  Mental status: Alert, oriented x 4, affect appropriate    Recent Labs   Lab 06/24/24  1110 06/25/24  0840   WBC 7.79 7.23   HGB 13.0 11.7*   HCT 38.7 37.5    247     Recent Labs   Lab 06/24/24  1110 06/25/24  0310    141   K 5.2* 3.8    109   CO2 21* 22*   BUN 9 10   CREATININE 0.7 0.7   GLU 74 61*   CALCIUM 8.4* 8.8   MG 2.0 2.0   PHOS  --  3.3     Recent Labs   Lab 06/24/24  1110 06/25/24  0310   ALKPHOS 165*  --    ALT 78*  --    AST 68*  --    ALBUMIN 2.7* 2.4*   PROT 5.4*  --    BILITOT 0.4  --         Recent Labs   Lab 06/25/24  0732 06/25/24  0858 06/25/24  1117 06/25/24  1620 06/25/24  1937   POCTGLUCOSE 69* 122* 83 163* 226*       Scheduled Meds:   acetaminophen  650 mg Oral TID    amLODIPine  5 mg Oral Daily    atorvastatin  80 mg Oral Daily    buPROPion  150 mg Oral Daily    [START ON 6/26/2024] cefTRIAXone (Rocephin) IV (PEDS and ADULTS)  1 g Intravenous Daily    clopidogreL  75 mg Oral Daily    enoxparin  40 mg Subcutaneous Daily     EScitalopram oxalate  20 mg Oral Daily    folic acid-vit B6-vit B12 2.5-25-2 mg  1 tablet Oral Daily    furosemide  20 mg Oral Daily    [START ON 6/26/2024] hydrocortisone  10 mg Oral QHS    hydrocortisone  20 mg Oral Daily    Lactobacillus rhamnosus GG  1 capsule Oral BID    levothyroxine  50 mcg Oral Before breakfast    LIDOcaine  3 patch Transdermal Q24H    losartan  100 mg Oral Daily    miconazole nitrate 2%   Topical (Top) BID    montelukast  10 mg Oral QHS    pantoprazole  40 mg Oral Daily    vancomycin  125 mg Oral QHS     Continuous Infusions:  As Needed:    Current Facility-Administered Medications:     acetaminophen, 650 mg, Oral, Q4H PRN    dextrose 10%, 12.5 g, Intravenous, PRN    dextrose 10%, 25 g, Intravenous, PRN    glucagon (human recombinant), 1 mg, Intramuscular, PRN    glucose, 16 g, Oral, PRN    glucose, 24 g, Oral, PRN    melatonin, 6 mg, Oral, Nightly PRN    naloxone, 0.02 mg, Intravenous, PRN    OLANZapine, 2.5 mg, Oral, BID PRN    ondansetron, 4 mg, Oral, Q8H PRN    sodium chloride 0.9%, 10 mL, Intravenous, Q12H PRN    Assessment and Plan  / Problems managed today    * Acute metabolic encephalopathy  CT head negative for acute intracranial infection   CT head showed at 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 However, Urine culture grew contaminants  ID matthew recommended - ceftriaxone 1 g IV x 3 days for empiric treatment  Scan bladder q6h to evaluate for retained urine  May need urology follow as outpatient     History of infection of intestine due to  Clostridioides difficile  Resolved with fidaxomicin 4/2024. Recently treated on daily oral vancomycin for prophylaxis while on antibiotics for UTIs.   Resume lactobacillus BID  ID consult - vancomycin prophylaxis at 125 mg daily    Essential (primary) hypertension  Resume losartan 100 mg daily and amlodipine 5 mg daily    Chronic congestive heart failure with left ventricular diastolic dysfunction  Furosemide 20 mg daily    DJD (degenerative joint disease), multiple sites  Lidocaine to bilateral knees and right hip  Acetaminophen 650 mg TID  Consider restart pregabalin at lower dose when mentally back to baseline    Adrenal insufficiency  Resume hydrcortisone at 10 mg qAM and 5 mg qhs    Severe anxiety  Resume bupropion  mg daily  Escitalopram 20 mg daily    Hypothyroidism  Levothyroxine 50 mcg daily    Diet:  regular diet  GI PPx: protonix  DVT PPx:  enoxaparin  Airways: room air  Wounds: none    Goals of Care:  Return to prior functional status \    Discharge Planning   DAMIR: 6/29/2024   Is the patient medically ready for discharge?:     Reason for patient still in hospital (select all that apply): Patient trending condition and Treatment  Discharge Plan A: Rehab ([facility]        Georgia Wright MD

## 2024-06-27 NOTE — PLAN OF CARE
[facility] - Med Surg ([facility])  Discharge Final Note    Primary Care Provider: Randy Gurrola MD    Expected Discharge Date: 6/26/2024    Final Discharge Note (most recent)       Final Note - 06/27/24 0824          Final Note    Assessment Type Final Discharge Note     Anticipated Discharge Disposition Rehab Facility (P)      What phone number can be called within the next 1-3 days to see how you are doing after discharge? [phone] (P)         Post-Acute Status    Post-Acute Authorization Placement (P)      Post-Acute Placement Status Set-up Complete/Auth obtained (P)      Coverage MEDICARE - MEDICARE PART A & B - (P)                      Important Message from Medicare  Important Message from Medicare regarding Discharge Appeal Rights: Given to patient/caregiver, Explained to patient/caregiver, Signed/date by patient/caregiver     Date IMM was signed: 06/26/24  Time IMM was signed: 1445      Patient discharged to [facility].                        VANESSA Suarez, LMSW  [facility]  Case Management  Ext. [phone]

## 2024-06-28 LAB — VIT B1 BLD-MCNC: 75 UG/L (ref 38–122)
